# Patient Record
Sex: MALE | Race: WHITE | Employment: FULL TIME | URBAN - NONMETROPOLITAN AREA
[De-identification: names, ages, dates, MRNs, and addresses within clinical notes are randomized per-mention and may not be internally consistent; named-entity substitution may affect disease eponyms.]

---

## 2017-02-27 ENCOUNTER — TELEPHONE (OUTPATIENT)
Dept: CARDIOLOGY | Age: 57
End: 2017-02-27

## 2019-08-08 ENCOUNTER — OFFICE VISIT (OUTPATIENT)
Dept: FAMILY MEDICINE CLINIC | Age: 59
End: 2019-08-08
Payer: COMMERCIAL

## 2019-08-08 VITALS
SYSTOLIC BLOOD PRESSURE: 132 MMHG | WEIGHT: 227 LBS | HEIGHT: 66 IN | OXYGEN SATURATION: 96 % | BODY MASS INDEX: 36.48 KG/M2 | HEART RATE: 109 BPM | RESPIRATION RATE: 16 BRPM | TEMPERATURE: 97.1 F | DIASTOLIC BLOOD PRESSURE: 74 MMHG

## 2019-08-08 DIAGNOSIS — M25.50 ARTHRALGIA, UNSPECIFIED JOINT: ICD-10-CM
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
## 2019-08-08 DIAGNOSIS — R21 RASH OF BACK: Primary | ICD-10-CM

## 2019-08-08 DIAGNOSIS — K46.9 ABDOMINAL HERNIA WITHOUT OBSTRUCTION AND WITHOUT GANGRENE, RECURRENCE NOT SPECIFIED, UNSPECIFIED HERNIA TYPE: ICD-10-CM

## 2019-08-08 DIAGNOSIS — R21 RASH OF BACK: ICD-10-CM

## 2019-08-08 LAB
ALBUMIN SERPL-MCNC: 3.8 G/DL (ref 3.5–5.2)
ALP BLD-CCNC: 90 U/L (ref 40–130)
ALT SERPL-CCNC: 12 U/L (ref 5–41)
ANION GAP SERPL CALCULATED.3IONS-SCNC: 11 MMOL/L (ref 7–19)
AST SERPL-CCNC: 25 U/L (ref 5–40)
BASOPHILS ABSOLUTE: 0 K/UL (ref 0–0.2)
BASOPHILS RELATIVE PERCENT: 0.3 % (ref 0–1)
BILIRUB SERPL-MCNC: 0.3 MG/DL (ref 0.2–1.2)
BUN BLDV-MCNC: 11 MG/DL (ref 8–23)
C-REACTIVE PROTEIN: 2.99 MG/DL (ref 0–0.5)
CALCIUM SERPL-MCNC: 8.5 MG/DL (ref 8.8–10.2)
CHLORIDE BLD-SCNC: 101 MMOL/L (ref 98–111)
CO2: 25 MMOL/L (ref 22–29)
CREAT SERPL-MCNC: 1.2 MG/DL (ref 0.5–1.2)
EOSINOPHILS ABSOLUTE: 0 K/UL (ref 0–0.6)
EOSINOPHILS RELATIVE PERCENT: 0.2 % (ref 0–5)
GFR NON-AFRICAN AMERICAN: >60
GLUCOSE BLD-MCNC: 88 MG/DL (ref 74–109)
HCT VFR BLD CALC: 43.8 % (ref 42–52)
HEMOGLOBIN: 13.9 G/DL (ref 14–18)
LYMPHOCYTES ABSOLUTE: 2.3 K/UL (ref 1.1–4.5)
LYMPHOCYTES RELATIVE PERCENT: 37.2 % (ref 20–40)
MCH RBC QN AUTO: 27 PG (ref 27–31)
MCHC RBC AUTO-ENTMCNC: 31.7 G/DL (ref 33–37)
MCV RBC AUTO: 85 FL (ref 80–94)
MONOCYTES ABSOLUTE: 1.6 K/UL (ref 0–0.9)
MONOCYTES RELATIVE PERCENT: 25.8 % (ref 0–10)
NEUTROPHILS ABSOLUTE: 2 K/UL (ref 1.5–7.5)
NEUTROPHILS RELATIVE PERCENT: 32.5 % (ref 50–65)
PDW BLD-RTO: 13.3 % (ref 11.5–14.5)
PLATELET # BLD: 165 K/UL (ref 130–400)
PMV BLD AUTO: 10.2 FL (ref 9.4–12.4)
POTASSIUM SERPL-SCNC: 4 MMOL/L (ref 3.5–5)
RBC # BLD: 5.15 M/UL (ref 4.7–6.1)
RHEUMATOID FACTOR: <10 IU/ML
SODIUM BLD-SCNC: 137 MMOL/L (ref 136–145)
TOTAL PROTEIN: 7.3 G/DL (ref 6.6–8.7)
WBC # BLD: 6.2 K/UL (ref 4.8–10.8)

## 2019-08-08 PROCEDURE — 99204 OFFICE O/P NEW MOD 45 MIN: CPT | Performed by: FAMILY MEDICINE

## 2019-08-08 RX ORDER — DOXYCYCLINE HYCLATE 100 MG
100 TABLET ORAL 2 TIMES DAILY
Qty: 20 TABLET | Refills: 0 | Status: SHIPPED | OUTPATIENT
Start: 2019-08-08 | End: 2019-08-18

## 2019-08-08 RX ORDER — MELOXICAM 15 MG/1
15 TABLET ORAL DAILY
Qty: 30 TABLET | Refills: 3 | Status: SHIPPED | OUTPATIENT
Start: 2019-08-08 | End: 2019-08-26

## 2019-08-08 SDOH — HEALTH STABILITY: MENTAL HEALTH: HOW OFTEN DO YOU HAVE A DRINK CONTAINING ALCOHOL?: NEVER

## 2019-08-08 ASSESSMENT — PATIENT HEALTH QUESTIONNAIRE - PHQ9
SUM OF ALL RESPONSES TO PHQ9 QUESTIONS 1 & 2: 0
SUM OF ALL RESPONSES TO PHQ QUESTIONS 1-9: 0
2. FEELING DOWN, DEPRESSED OR HOPELESS: 0
1. LITTLE INTEREST OR PLEASURE IN DOING THINGS: 0
SUM OF ALL RESPONSES TO PHQ QUESTIONS 1-9: 0

## 2019-08-11 LAB
ANA IGG, ELISA: NORMAL
EHRLICHIA CHAFFEENSIS AB, IGG: NORMAL
EHRLICHIA CHAFFEENSIS AB, IGM: NORMAL
LYME, EIA: 0.53 LIV (ref 0–1.2)

## 2019-08-11 ASSESSMENT — ENCOUNTER SYMPTOMS
ABDOMINAL PAIN: 0
DIARRHEA: 0
RHINORRHEA: 0
WHEEZING: 0
EYE PAIN: 0
EYE DISCHARGE: 0
COUGH: 0
SHORTNESS OF BREATH: 0
VOMITING: 0
NAUSEA: 0
CONSTIPATION: 0
BACK PAIN: 0

## 2019-08-12 DIAGNOSIS — R21 RASH OF BACK: ICD-10-CM

## 2019-08-12 DIAGNOSIS — M25.50 ARTHRALGIA, UNSPECIFIED JOINT: ICD-10-CM

## 2019-08-12 LAB
ROCKY MOUNTAIN SPOTTED FEVER AB IGM: ABNORMAL
ROCKY MOUNTAIN SPOTTED FEVER ANTIBODY IGG: ABNORMAL

## 2019-08-15 LAB — RPR: NORMAL

## 2019-08-19 ENCOUNTER — OFFICE VISIT (OUTPATIENT)
Dept: FAMILY MEDICINE CLINIC | Age: 59
End: 2019-08-19
Payer: COMMERCIAL

## 2019-08-19 ENCOUNTER — HOSPITAL ENCOUNTER (OUTPATIENT)
Age: 59
Setting detail: SPECIMEN
Discharge: HOME | End: 2019-08-19
Payer: COMMERCIAL

## 2019-08-19 VITALS
RESPIRATION RATE: 20 BRPM | SYSTOLIC BLOOD PRESSURE: 118 MMHG | TEMPERATURE: 98 F | OXYGEN SATURATION: 97 % | HEART RATE: 92 BPM | DIASTOLIC BLOOD PRESSURE: 76 MMHG | WEIGHT: 229 LBS | BODY MASS INDEX: 36.8 KG/M2 | HEIGHT: 66 IN

## 2019-08-19 DIAGNOSIS — M19.90 ARTHRITIS: ICD-10-CM

## 2019-08-19 DIAGNOSIS — A77.0 RMSF (ROCKY MOUNTAIN SPOTTED FEVER): ICD-10-CM

## 2019-08-19 DIAGNOSIS — R21 RASH AND NONSPECIFIC SKIN ERUPTION: Primary | ICD-10-CM

## 2019-08-19 PROCEDURE — 11104 PUNCH BX SKIN SINGLE LESION: CPT | Performed by: FAMILY MEDICINE

## 2019-08-19 PROCEDURE — 88305 TISSUE EXAM BY PATHOLOGIST: CPT

## 2019-08-19 PROCEDURE — 99213 OFFICE O/P EST LOW 20 MIN: CPT | Performed by: FAMILY MEDICINE

## 2019-08-19 PROCEDURE — 88312 SPECIAL STAINS GROUP 1: CPT

## 2019-08-19 RX ORDER — DOXYCYCLINE HYCLATE 100 MG
100 TABLET ORAL 2 TIMES DAILY
Qty: 14 TABLET | Refills: 0 | Status: SHIPPED | OUTPATIENT
Start: 2019-08-19 | End: 2019-08-26

## 2019-08-19 ASSESSMENT — ENCOUNTER SYMPTOMS
EYE PAIN: 0
COUGH: 0
SHORTNESS OF BREATH: 0
NAUSEA: 0
CONSTIPATION: 0
RHINORRHEA: 0
ABDOMINAL PAIN: 0
BACK PAIN: 0
DIARRHEA: 0
EYE DISCHARGE: 0
VOMITING: 0
WHEEZING: 0

## 2019-08-19 NOTE — PROGRESS NOTES
sutures were placed  with good approximation of the skin. The involved area was cleaned with antibiotic ointment applied and bandage covering the involved area. No complications occurred and patient tolerated punch biopsy without issues. Assessment:       ICD-10-CM    1. Rash and nonspecific skin eruption R21 External Referral To Dermatology     Surgical Pathology   2. [facility] spotted fever) A77.0 doxycycline hyclate (VIBRA-TABS) 100 MG tablet   3. Arthritis M19.90  symptomatic improvement with use of Mobic. Will continue at this time continue to monitor and adjust course dictates. Plan:  Discussed suggestive diagnoses and potential further treatment and work-up. Discussed possibility of involving dermatology for further evaluation recommendations. Discussed possibility of a biopsy of the rash for further information. Also discussed the option of expectorant management moving forward due to a possible diagnosis. Through shared decision making the determination was to proceed with the biopsy for further evaluation as well as attempt to get him in with dermatology for further review and hopefully by that time the biopsy results will be available to assist with diagnosis and management moving forward. Discussed current expected course of doxycycline with [facility] spotted fever but with the rash and duration and his other symptoms discussed possibility of an extended course of the doxycycline and patient was agreeable. Discussed proper use of medication. Discussed signs and symptoms requiring medical attention. All questions were answered and patient voiced understanding and agreement with plan as discussed.     Orders Placed This Encounter   Procedures    Surgical Pathology     4 mm punch biopsy of rash taken from right lower back     Standing Status:   Future     Standing Expiration Date:   8/19/2020     Order Specific Question:   PREVIOUS BIOPSY     Answer:   No     Order Specific

## 2019-08-26 ENCOUNTER — OFFICE VISIT (OUTPATIENT)
Dept: FAMILY MEDICINE CLINIC | Age: 59
End: 2019-08-26
Payer: COMMERCIAL

## 2019-08-26 VITALS
OXYGEN SATURATION: 98 % | BODY MASS INDEX: 36.7 KG/M2 | SYSTOLIC BLOOD PRESSURE: 124 MMHG | HEART RATE: 95 BPM | WEIGHT: 227.4 LBS | DIASTOLIC BLOOD PRESSURE: 70 MMHG | TEMPERATURE: 96.7 F

## 2019-08-26 DIAGNOSIS — R21 RASH: Primary | ICD-10-CM

## 2019-08-26 DIAGNOSIS — M19.90 ARTHRITIS: ICD-10-CM

## 2019-08-26 PROCEDURE — 99213 OFFICE O/P EST LOW 20 MIN: CPT | Performed by: FAMILY MEDICINE

## 2019-08-26 ASSESSMENT — ENCOUNTER SYMPTOMS
BACK PAIN: 0
RHINORRHEA: 0
VOMITING: 0
NAUSEA: 0
SHORTNESS OF BREATH: 0
COUGH: 0
EYE PAIN: 0
CONSTIPATION: 0
DIARRHEA: 0
WHEEZING: 0
EYE DISCHARGE: 0
ABDOMINAL PAIN: 0

## 2019-08-28 DIAGNOSIS — M19.90 ARTHRITIS: Primary | ICD-10-CM

## 2019-08-28 RX ORDER — IBUPROFEN 800 MG/1
800 TABLET ORAL
Qty: 90 TABLET | Refills: 2 | Status: SHIPPED | OUTPATIENT
Start: 2019-08-28 | End: 2019-12-23

## 2019-09-25 LAB
C-REACTIVE PROTEIN: 2.38 MG/DL (ref 0–0.5)
SEDIMENTATION RATE, ERYTHROCYTE: 43 MM/HR (ref 0–15)

## 2019-09-28 LAB — ANA IGG, ELISA: DETECTED

## 2019-10-09 ENCOUNTER — TRANSCRIBE ORDERS (OUTPATIENT)
Dept: ADMINISTRATIVE | Facility: HOSPITAL | Age: 59
End: 2019-10-09

## 2019-10-09 ENCOUNTER — LAB (OUTPATIENT)
Dept: LAB | Facility: HOSPITAL | Age: 59
End: 2019-10-09

## 2019-10-09 DIAGNOSIS — Z51.81 ENCOUNTER FOR THERAPEUTIC DRUG MONITORING: ICD-10-CM

## 2019-10-09 DIAGNOSIS — L93.0 LUPUS ERYTHEMATOSUS, UNSPECIFIED FORM: ICD-10-CM

## 2019-10-09 DIAGNOSIS — Z51.81 ENCOUNTER FOR THERAPEUTIC DRUG MONITORING: Primary | ICD-10-CM

## 2019-10-09 LAB — CHROMATIN AB SERPL-ACNC: <10 IU/ML (ref 0–14)

## 2019-10-09 PROCEDURE — 86431 RHEUMATOID FACTOR QUANT: CPT | Performed by: DERMATOLOGY

## 2019-10-09 PROCEDURE — 86235 NUCLEAR ANTIGEN ANTIBODY: CPT | Performed by: DERMATOLOGY

## 2019-10-09 PROCEDURE — 86225 DNA ANTIBODY NATIVE: CPT | Performed by: DERMATOLOGY

## 2019-10-09 PROCEDURE — 36415 COLL VENOUS BLD VENIPUNCTURE: CPT

## 2019-10-10 LAB
DSDNA AB SER-ACNC: 26 IU/ML (ref 0–9)
ENA RNP AB SER-ACNC: <0.2 AI (ref 0–0.9)
ENA SM AB SER-ACNC: <0.2 AI (ref 0–0.9)
ENA SS-A AB SER-ACNC: <0.2 AI (ref 0–0.9)
ENA SS-B AB SER-ACNC: 0.2 AI (ref 0–0.9)

## 2019-12-23 DIAGNOSIS — M19.90 ARTHRITIS: ICD-10-CM

## 2019-12-23 RX ORDER — IBUPROFEN 800 MG/1
TABLET ORAL
Qty: 90 TABLET | Refills: 1 | Status: SHIPPED | OUTPATIENT
Start: 2019-12-23

## 2020-03-02 ENCOUNTER — TRANSCRIBE ORDERS (OUTPATIENT)
Dept: ADMINISTRATIVE | Facility: HOSPITAL | Age: 60
End: 2020-03-02

## 2020-03-02 ENCOUNTER — LAB (OUTPATIENT)
Dept: LAB | Facility: HOSPITAL | Age: 60
End: 2020-03-02

## 2020-03-02 DIAGNOSIS — M31.2: Primary | ICD-10-CM

## 2020-03-02 DIAGNOSIS — M31.2: ICD-10-CM

## 2020-03-02 LAB
ALBUMIN SERPL-MCNC: 4.2 G/DL (ref 3.5–5.2)
ALBUMIN/GLOB SERPL: 1.2 G/DL
ALP SERPL-CCNC: 88 U/L (ref 39–117)
ALT SERPL W P-5'-P-CCNC: 18 U/L (ref 1–41)
ANION GAP SERPL CALCULATED.3IONS-SCNC: 12 MMOL/L (ref 5–15)
AST SERPL-CCNC: 20 U/L (ref 1–40)
BASOPHILS # BLD AUTO: 0.01 10*3/MM3 (ref 0–0.2)
BASOPHILS NFR BLD AUTO: 0.2 % (ref 0–1.5)
BILIRUB SERPL-MCNC: 0.5 MG/DL (ref 0.2–1.2)
BUN BLD-MCNC: 11 MG/DL (ref 8–23)
BUN/CREAT SERPL: 13.3 (ref 7–25)
CALCIUM SPEC-SCNC: 9.2 MG/DL (ref 8.6–10.5)
CHLORIDE SERPL-SCNC: 107 MMOL/L (ref 98–107)
CO2 SERPL-SCNC: 26 MMOL/L (ref 22–29)
CREAT BLD-MCNC: 0.83 MG/DL (ref 0.76–1.27)
CRP SERPL-MCNC: 0.96 MG/DL (ref 0–0.5)
DEPRECATED RDW RBC AUTO: 43.3 FL (ref 37–54)
EOSINOPHIL # BLD AUTO: 0.01 10*3/MM3 (ref 0–0.4)
EOSINOPHIL NFR BLD AUTO: 0.2 % (ref 0.3–6.2)
ERYTHROCYTE [DISTWIDTH] IN BLOOD BY AUTOMATED COUNT: 15 % (ref 12.3–15.4)
ERYTHROCYTE [SEDIMENTATION RATE] IN BLOOD: 18 MM/HR (ref 0–20)
GFR SERPL CREATININE-BSD FRML MDRD: 94 ML/MIN/1.73
GLOBULIN UR ELPH-MCNC: 3.4 GM/DL
GLUCOSE BLD-MCNC: 106 MG/DL (ref 65–99)
HCT VFR BLD AUTO: 43.8 % (ref 37.5–51)
HGB BLD-MCNC: 14.1 G/DL (ref 13–17.7)
IMM GRANULOCYTES # BLD AUTO: 0.11 10*3/MM3 (ref 0–0.05)
IMM GRANULOCYTES NFR BLD AUTO: 1.8 % (ref 0–0.5)
LYMPHOCYTES # BLD AUTO: 2.6 10*3/MM3 (ref 0.7–3.1)
LYMPHOCYTES NFR BLD AUTO: 43.5 % (ref 19.6–45.3)
MCH RBC QN AUTO: 26.1 PG (ref 26.6–33)
MCHC RBC AUTO-ENTMCNC: 32.2 G/DL (ref 31.5–35.7)
MCV RBC AUTO: 81.1 FL (ref 79–97)
MONOCYTES # BLD AUTO: 1.16 10*3/MM3 (ref 0.1–0.9)
MONOCYTES NFR BLD AUTO: 19.4 % (ref 5–12)
NEUTROPHILS # BLD AUTO: 2.09 10*3/MM3 (ref 1.7–7)
NEUTROPHILS NFR BLD AUTO: 34.9 % (ref 42.7–76)
NRBC BLD AUTO-RTO: 0 /100 WBC (ref 0–0.2)
PLATELET # BLD AUTO: 207 10*3/MM3 (ref 140–450)
PMV BLD AUTO: 9.4 FL (ref 6–12)
POTASSIUM BLD-SCNC: 4.3 MMOL/L (ref 3.5–5.2)
PROT SERPL-MCNC: 7.6 G/DL (ref 6–8.5)
RBC # BLD AUTO: 5.4 10*6/MM3 (ref 4.14–5.8)
SODIUM BLD-SCNC: 145 MMOL/L (ref 136–145)
WBC NRBC COR # BLD: 5.98 10*3/MM3 (ref 3.4–10.8)

## 2020-03-02 PROCEDURE — 36415 COLL VENOUS BLD VENIPUNCTURE: CPT

## 2020-03-02 PROCEDURE — 85025 COMPLETE CBC W/AUTO DIFF WBC: CPT | Performed by: INTERNAL MEDICINE

## 2020-03-02 PROCEDURE — 86140 C-REACTIVE PROTEIN: CPT | Performed by: INTERNAL MEDICINE

## 2020-03-02 PROCEDURE — 85652 RBC SED RATE AUTOMATED: CPT | Performed by: INTERNAL MEDICINE

## 2020-03-02 PROCEDURE — 80053 COMPREHEN METABOLIC PANEL: CPT | Performed by: INTERNAL MEDICINE

## 2020-07-08 ENCOUNTER — TELEPHONE (OUTPATIENT)
Dept: FAMILY MEDICINE CLINIC | Age: 60
End: 2020-07-08

## 2020-07-08 NOTE — TELEPHONE ENCOUNTER
Patient called in stating he has been exposed to [place] by his father who is in the hospital (not for Covid, dementia/alzheimers issues). Patient is not experiencing any symptoms as of today. He was exposed on 7/6/20 and has been quarantining away from his immediate family and the general public. Requested I speak with Dr Karo Cook about the next steps. Spoke with Dr Karo Cook and his recommendations are as follows: \"since the patient is not showing any symptoms as of today, the best thing to do is to quarantine for 2 weeks. We do not want to get a false sense of security if we test too early. If he starts to develop symptoms to call and we will get him tested then. Patient is to call Dr Brittany Gordillo office to discuss their protocol on Covid. \" He voiced understanding and is agreeable to Dr Deborah Khoury recommendations

## 2020-07-10 ENCOUNTER — OFFICE VISIT (OUTPATIENT)
Age: 60
End: 2020-07-10

## 2020-07-14 LAB
REPORT: NORMAL
SARS-COV-2: NOT DETECTED
THIS TEST SENT TO: NORMAL

## 2021-02-03 ENCOUNTER — TRANSCRIBE ORDERS (OUTPATIENT)
Dept: ADMINISTRATIVE | Facility: HOSPITAL | Age: 61
End: 2021-02-03

## 2021-02-03 ENCOUNTER — LAB (OUTPATIENT)
Dept: LAB | Facility: HOSPITAL | Age: 61
End: 2021-02-03

## 2021-02-03 DIAGNOSIS — M06.4 INFLAMMATORY POLYARTHROPATHY (HCC): ICD-10-CM

## 2021-02-03 DIAGNOSIS — M06.4 INFLAMMATORY POLYARTHROPATHY (HCC): Primary | ICD-10-CM

## 2021-02-03 LAB
BASOPHILS # BLD AUTO: 0.02 10*3/MM3 (ref 0–0.2)
BASOPHILS NFR BLD AUTO: 0.4 % (ref 0–1.5)
DEPRECATED RDW RBC AUTO: 40 FL (ref 37–54)
EOSINOPHIL # BLD AUTO: 0.01 10*3/MM3 (ref 0–0.4)
EOSINOPHIL NFR BLD AUTO: 0.2 % (ref 0.3–6.2)
ERYTHROCYTE [DISTWIDTH] IN BLOOD BY AUTOMATED COUNT: 13.9 % (ref 12.3–15.4)
HCT VFR BLD AUTO: 40.5 % (ref 37.5–51)
HGB BLD-MCNC: 13.5 G/DL (ref 13–17.7)
IMM GRANULOCYTES # BLD AUTO: 0.06 10*3/MM3 (ref 0–0.05)
IMM GRANULOCYTES NFR BLD AUTO: 1.1 % (ref 0–0.5)
LYMPHOCYTES # BLD AUTO: 2.21 10*3/MM3 (ref 0.7–3.1)
LYMPHOCYTES NFR BLD AUTO: 41.3 % (ref 19.6–45.3)
MCH RBC QN AUTO: 27 PG (ref 26.6–33)
MCHC RBC AUTO-ENTMCNC: 33.3 G/DL (ref 31.5–35.7)
MCV RBC AUTO: 81 FL (ref 79–97)
MONOCYTES # BLD AUTO: 0.78 10*3/MM3 (ref 0.1–0.9)
MONOCYTES NFR BLD AUTO: 14.6 % (ref 5–12)
NEUTROPHILS NFR BLD AUTO: 2.27 10*3/MM3 (ref 1.7–7)
NEUTROPHILS NFR BLD AUTO: 42.4 % (ref 42.7–76)
NRBC BLD AUTO-RTO: 0 /100 WBC (ref 0–0.2)
PLATELET # BLD AUTO: 209 10*3/MM3 (ref 140–450)
PMV BLD AUTO: 10.8 FL (ref 6–12)
RBC # BLD AUTO: 5 10*6/MM3 (ref 4.14–5.8)
WBC # BLD AUTO: 5.35 10*3/MM3 (ref 3.4–10.8)

## 2021-02-03 PROCEDURE — 36415 COLL VENOUS BLD VENIPUNCTURE: CPT

## 2021-02-03 PROCEDURE — 85025 COMPLETE CBC W/AUTO DIFF WBC: CPT

## 2021-10-12 ENCOUNTER — OFFICE VISIT (OUTPATIENT)
Dept: FAMILY MEDICINE CLINIC | Age: 61
End: 2021-10-12
Payer: COMMERCIAL

## 2021-10-12 ENCOUNTER — PATIENT MESSAGE (OUTPATIENT)
Dept: FAMILY MEDICINE CLINIC | Age: 61
End: 2021-10-12

## 2021-10-12 VITALS
WEIGHT: 237 LBS | BODY MASS INDEX: 38.25 KG/M2 | OXYGEN SATURATION: 97 % | DIASTOLIC BLOOD PRESSURE: 66 MMHG | SYSTOLIC BLOOD PRESSURE: 134 MMHG | HEART RATE: 90 BPM | TEMPERATURE: 97.3 F | RESPIRATION RATE: 16 BRPM

## 2021-10-12 DIAGNOSIS — Z53.20 COLONOSCOPY REFUSED: ICD-10-CM

## 2021-10-12 DIAGNOSIS — N30.01 ACUTE CYSTITIS WITH HEMATURIA: Primary | ICD-10-CM

## 2021-10-12 DIAGNOSIS — S20.212A CHEST WALL CONTUSION, LEFT, INITIAL ENCOUNTER: ICD-10-CM

## 2021-10-12 DIAGNOSIS — R31.0 GROSS HEMATURIA: ICD-10-CM

## 2021-10-12 PROCEDURE — 99213 OFFICE O/P EST LOW 20 MIN: CPT | Performed by: FAMILY MEDICINE

## 2021-10-12 RX ORDER — PREDNISONE 1 MG/1
5 TABLET ORAL DAILY
COMMUNITY

## 2021-10-12 RX ORDER — SULFAMETHOXAZOLE AND TRIMETHOPRIM 800; 160 MG/1; MG/1
1 TABLET ORAL 2 TIMES DAILY
Qty: 14 TABLET | Refills: 0 | Status: SHIPPED | OUTPATIENT
Start: 2021-10-12 | End: 2021-10-19

## 2021-10-12 ASSESSMENT — PATIENT HEALTH QUESTIONNAIRE - PHQ9
SUM OF ALL RESPONSES TO PHQ9 QUESTIONS 1 & 2: 0
2. FEELING DOWN, DEPRESSED OR HOPELESS: 0
SUM OF ALL RESPONSES TO PHQ QUESTIONS 1-9: 0
1. LITTLE INTEREST OR PLEASURE IN DOING THINGS: 0

## 2021-10-12 NOTE — PROGRESS NOTES
[facility]  [address] [id number]  Dept: [phone]  Dept Fax: [phone]    Visit type: Established patient    Reason for Visit: Hematuria (started this am) and Chest Pain (left rib pain. fell 1 week ago)         Assessment and Plan       1. Acute cystitis with hematuria  -     sulfamethoxazole-trimethoprim (BACTRIM DS;SEPTRA DS) 800-160 MG per tablet; Take 1 tablet by mouth 2 times daily for 7 days, Disp-14 tablet, R-0Normal  2. Gross hematuria  -     Urinalysis Reflex to Culture; Future  3. Chest wall contusion, left, initial encounter  4. Colonoscopy refused    Discussed diagnosis, expected course, and proper use of medication, including OTC medications if prescription is too expensive or insurance does not cover. Discussed proper care of side. Discussed possibility of further work-up if symptoms continue or worsen. Discussed signs and symptoms requiring medical attention. All questions were answered and patient voiced understanding and agreement with plan as discussed. Return if symptoms worsen or fail to improve, for next scheduled follow up with PCP. Subjective       HPI   Patient reports that when he woke up during the night he noticed his urine was pink. He states that when he got up this morning it was darker and states that he is concerned that he might have a UTI. He denies any dysuria or other symptoms. He does state that about a week ago he fell and hurt his side. He states that it is still sore. He denies any problems breathing or other issues just wanted to have it looked at while he was her. Review of Systems   Constitutional: Negative for activity change, appetite change, fatigue and fever. HENT: Negative for congestion and rhinorrhea. Eyes: Negative for pain and discharge. Respiratory: Negative for cough and shortness of breath. Cardiovascular: Positive for chest pain (lateral chest wall). Negative for palpitations. Gastrointestinal: Negative for abdominal pain, constipation, diarrhea, nausea and vomiting. Endocrine: Negative for cold intolerance and heat intolerance. Genitourinary: Positive for frequency and hematuria. Negative for dysuria. Musculoskeletal: Negative for back pain, gait problem, myalgias and neck pain. Skin: Negative for rash and wound. No Known Allergies    Outpatient Medications Prior to Visit   Medication Sig Dispense Refill    predniSONE (DELTASONE) 5 MG tablet Take 5 mg by mouth daily      MULTIPLE VITAMIN PO Take by mouth daily      ibuprofen (ADVIL;MOTRIN) 800 MG tablet TAKE 1 TABLET BY MOUTH THREE TIMES DAILY WITH MEALS 90 tablet 1     No facility-administered medications prior to visit. Past Medical History:   Diagnosis Date    Hiatal hernia         Social History     Tobacco Use    Smoking status: Never Smoker    Smokeless tobacco: Never Used   Substance Use Topics    Alcohol use: Never        Past Surgical History:   Procedure Laterality Date    UMBILICAL HERNIA REPAIR N/A     UPPER GASTROINTESTINAL ENDOSCOPY         No family history on file. Objective       /66 (Site: Left Upper Arm, Position: Sitting, Cuff Size: Medium Adult)   Pulse 90   Temp 97.3 °F (36.3 °C) (Skin)   Resp 16   Wt 237 lb (107.5 kg)   SpO2 97%   BMI 38.25 kg/m²   Physical Exam  Vitals and nursing note reviewed. Constitutional:       General: He is not in acute distress. Appearance: Normal appearance. He is well-developed. He is not diaphoretic. HENT:      Head: Normocephalic and atraumatic. Right Ear: External ear normal.      Left Ear: External ear normal.      Mouth/Throat:      Comments: Mask in place  Eyes:      General: No scleral icterus. Right eye: No discharge. Left eye: No discharge. Conjunctiva/sclera: Conjunctivae normal.   Neck:      Trachea: No tracheal deviation. Cardiovascular:      Rate and Rhythm: Normal rate and regular rhythm. Heart sounds: Normal heart sounds. No murmur heard. No friction rub. No gallop. Pulmonary:      Effort: Pulmonary effort is normal. No respiratory distress. Breath sounds: Normal breath sounds. No wheezing or rales. Comments: Left lateral chest wall tenderness without any appreciable bruising. No appreciable bony tenderness. Chest:      Chest wall: Tenderness present. Abdominal:      General: Bowel sounds are normal. There is no distension. Palpations: Abdomen is soft. Tenderness: There is no abdominal tenderness. There is no right CVA tenderness or left CVA tenderness. Musculoskeletal:         General: No deformity (No gross deformities of upper or lower extremities) or signs of injury. Cervical back: Neck supple. No muscular tenderness. Right lower leg: No edema. Left lower leg: No edema. Lymphadenopathy:      Cervical: No cervical adenopathy. Skin:     General: Skin is warm and dry. Findings: No erythema or rash. Neurological:      Mental Status: He is alert and oriented to person, place, and time. Cranial Nerves: No cranial nerve deficit. Psychiatric:         Behavior: Behavior normal.         Thought Content:  Thought content normal.           Data Reviewed and Summarized       Labs:     Imaging/Testing:        Shakeel Alvarez MD

## 2021-10-12 NOTE — TELEPHONE ENCOUNTER
From: Kimberly Ireland  To: Devon Weston MD  Sent: 10/12/2021 8:22 AM CDT  Subject: Non-Urgent Medical Question    This morning I had pinkish urine and later it was more pinkish (a bit darker). Im 61 and have an auto immune condition but I fell on the ice last week and bruised a rib (still sore) and I think that might be a cause. Anyway, I can drop by the lab and give them a sample and come and see the dr or whatever I need to do just let me know.   [phone]

## 2021-11-05 ASSESSMENT — ENCOUNTER SYMPTOMS
ABDOMINAL PAIN: 0
CONSTIPATION: 0
DIARRHEA: 0
BACK PAIN: 0
NAUSEA: 0
EYE PAIN: 0
RHINORRHEA: 0
VOMITING: 0
COUGH: 0
SHORTNESS OF BREATH: 0
EYE DISCHARGE: 0

## 2021-11-06 NOTE — PATIENT INSTRUCTIONS
Patient Education        Blood in the Urine: Care Instructions  Your Care Instructions     Blood in the urine, or hematuria, may make the urine look red, brown, or pink. There may be blood every time you urinate or just from time to time. You cannot always see blood in the urine, but it will show up in a urine test.  Blood in the urine may be serious. It should always be checked by a doctor. Your doctor may recommend more tests, including an X-ray, a CT scan, or a cystoscopy (which lets a doctor look inside the urethra and bladder). Blood in the urine can be a sign of another problem. Common causes are bladder infections and kidney stones. An injury to your groin or your genital area can also cause bleeding in the urinary tract. Very hard exercisesuch as running a marathoncan cause blood in the urine. Blood in the urine can also be a sign of kidney disease or cancer in the bladder or kidney. Many cases of blood in the urine are caused by a harmless condition that runs in families. This is called benign familial hematuria. It does not need any treatment. Sometimes your urine may look red or brown even though it does not contain blood. For example, not getting enough fluids (dehydration), taking certain medicines, or having a liver problem can change the color of your urine. Eating foods such as beets, rhubarb, or blackberries or foods with red food coloring can make your urine look red or pink. Follow-up care is a key part of your treatment and safety. Be sure to make and go to all appointments, and call your doctor if you are having problems. It's also a good idea to know your test results and keep a list of the medicines you take. When should you call for help? Call your doctor now or seek immediate medical care if:    · You have symptoms of a urinary infection. For example:  ? You have pus in your urine. ? You have pain in your back just below your rib cage. This is called flank pain. ?  You have a fever, chills, or body aches. ? It hurts to urinate. ? You have groin or belly pain.     · You have more blood in your urine. Watch closely for changes in your health, and be sure to contact your doctor if:    · You have new urination problems.     · You do not get better as expected. Where can you learn more? Go to [email or web address] and sign in to your [employer] account. Enter C055 in the [employer] box to learn more about \"Blood in the Urine: Care Instructions. \"     If you do not have an account, please click on the \"Sign Up Now\" link. Current as of: February 10, 2021               Content Version: 13.0  © 2006-2021 Healthwise[employer] Incorporated. Care instructions adapted under license by [facility] ([facility]). If you have questions about a medical condition or this instruction, always ask your healthcare professional. [place] [address] any warranty or liability for your use of this information.

## 2023-06-18 DIAGNOSIS — J34.89 NASAL CONGESTION WITH RHINORRHEA: ICD-10-CM

## 2023-06-18 DIAGNOSIS — R09.81 NASAL CONGESTION WITH RHINORRHEA: ICD-10-CM

## 2023-06-19 RX ORDER — LEVOCETIRIZINE DIHYDROCHLORIDE 5 MG/1
5 TABLET, FILM COATED ORAL NIGHTLY
Qty: 90 TABLET | Refills: 1 | Status: SHIPPED | OUTPATIENT
Start: 2023-06-19

## 2023-06-19 RX ORDER — FLUTICASONE PROPIONATE 50 MCG
SPRAY, SUSPENSION (ML) NASAL
Qty: 3 EACH | Refills: 1 | Status: SHIPPED | OUTPATIENT
Start: 2023-06-19

## 2023-06-19 NOTE — TELEPHONE ENCOUNTER
Last OV 3/23/2023  Next OV Visit date not found      Requested Prescriptions     Pending Prescriptions Disp Refills    fluticasone (FLONASE) 50 MCG/ACT nasal spray [Pharmacy Med Name: FLUTICASONE PROP 50 MCG SPRAY]       Sig: SPRAY 2 SPRAYS INTO EACH NOSTRIL EVERY DAY    levocetirizine (XYZAL) 5 MG tablet [Pharmacy Med Name: LEVOCETIRIZINE 5 MG TABLET] 90 tablet      Sig: TAKE 1 TABLET BY MOUTH NIGHTLY

## 2024-01-04 ENCOUNTER — OFFICE VISIT (OUTPATIENT)
Dept: PRIMARY CARE CLINIC | Age: 64
End: 2024-01-04
Payer: MEDICARE

## 2024-01-04 VITALS
SYSTOLIC BLOOD PRESSURE: 136 MMHG | DIASTOLIC BLOOD PRESSURE: 82 MMHG | TEMPERATURE: 97.8 F | WEIGHT: 245 LBS | BODY MASS INDEX: 38.37 KG/M2 | RESPIRATION RATE: 136 BRPM | OXYGEN SATURATION: 98 % | HEART RATE: 87 BPM

## 2024-01-04 DIAGNOSIS — J06.9 UPPER RESPIRATORY TRACT INFECTION, UNSPECIFIED TYPE: Primary | ICD-10-CM

## 2024-01-04 DIAGNOSIS — J02.9 SORE THROAT: ICD-10-CM

## 2024-01-04 LAB
INFLUENZA A ANTIBODY: NEGATIVE
INFLUENZA B ANTIBODY: NEGATIVE
S PYO AG THROAT QL: NORMAL

## 2024-01-04 PROCEDURE — G8484 FLU IMMUNIZE NO ADMIN: HCPCS | Performed by: NURSE PRACTITIONER

## 2024-01-04 PROCEDURE — G8427 DOCREV CUR MEDS BY ELIG CLIN: HCPCS | Performed by: NURSE PRACTITIONER

## 2024-01-04 PROCEDURE — 1123F ACP DISCUSS/DSCN MKR DOCD: CPT | Performed by: NURSE PRACTITIONER

## 2024-01-04 PROCEDURE — 3017F COLORECTAL CA SCREEN DOC REV: CPT | Performed by: NURSE PRACTITIONER

## 2024-01-04 PROCEDURE — 1036F TOBACCO NON-USER: CPT | Performed by: NURSE PRACTITIONER

## 2024-01-04 PROCEDURE — 99203 OFFICE O/P NEW LOW 30 MIN: CPT | Performed by: NURSE PRACTITIONER

## 2024-01-04 PROCEDURE — G8417 CALC BMI ABV UP PARAM F/U: HCPCS | Performed by: NURSE PRACTITIONER

## 2024-01-04 RX ORDER — AMOXICILLIN AND CLAVULANATE POTASSIUM 875; 125 MG/1; MG/1
1 TABLET, FILM COATED ORAL 2 TIMES DAILY
Qty: 20 TABLET | Refills: 0 | Status: SHIPPED | OUTPATIENT
Start: 2024-01-04 | End: 2024-01-04 | Stop reason: SDUPTHER

## 2024-01-04 RX ORDER — AMOXICILLIN AND CLAVULANATE POTASSIUM 875; 125 MG/1; MG/1
1 TABLET, FILM COATED ORAL 2 TIMES DAILY
Qty: 20 TABLET | Refills: 0 | Status: SHIPPED | OUTPATIENT
Start: 2024-01-04 | End: 2024-01-14

## 2024-01-04 ASSESSMENT — ENCOUNTER SYMPTOMS
VOMITING: 0
ABDOMINAL PAIN: 0
EYE DISCHARGE: 0
SORE THROAT: 1
WHEEZING: 0
NAUSEA: 0
COUGH: 1
DIARRHEA: 0
SINUS PRESSURE: 0
CONSTIPATION: 0
BLOOD IN STOOL: 0
SHORTNESS OF BREATH: 0
RHINORRHEA: 0
COLOR CHANGE: 0
EYE ITCHING: 0

## 2024-01-04 NOTE — PROGRESS NOTES
facility-administered medications for this visit.       No Known Allergies    Health Maintenance   Topic Date Due    COVID-19 Vaccine (1) Never done    Depression Screen  Never done    HIV screen  Never done    Hepatitis C screen  Never done    DTaP/Tdap/Td vaccine (1 - Tdap) Never done    Diabetes screen  Never done    Lipids  Never done    Colorectal Cancer Screen  Never done    Shingles vaccine (1 of 2) Never done    Respiratory Syncytial Virus (RSV) Pregnant or age 60 yrs+ (1 - 1-dose 60+ series) Never done    Pneumococcal 65+ years Vaccine (1 - PCV) Never done    Flu vaccine  Completed    Hepatitis A vaccine  Aged Out    Hepatitis B vaccine  Aged Out    Hib vaccine  Aged Out    Polio vaccine  Aged Out    Meningococcal (ACWY) vaccine  Aged Out       Subjective:   Review of Systems   Constitutional:  Negative for activity change, appetite change, chills, fatigue and fever.   HENT:  Positive for congestion and sore throat. Negative for ear pain, rhinorrhea and sinus pressure.    Eyes:  Negative for discharge and itching.   Respiratory:  Positive for cough (productive - purulent). Negative for shortness of breath and wheezing.    Cardiovascular:  Negative for chest pain.   Gastrointestinal:  Negative for abdominal pain, blood in stool, constipation, diarrhea, nausea and vomiting.   Musculoskeletal:  Negative for myalgias.   Skin:  Negative for color change and rash.   Neurological:  Negative for dizziness and headaches.   All other systems reviewed and are negative.      Objective    Physical Exam  Vitals and nursing note reviewed.   Constitutional:       General: He is not in acute distress.     Appearance: Normal appearance.   HENT:      Head: Normocephalic and atraumatic.      Right Ear: Ear canal normal. A middle ear effusion is present.      Left Ear: Ear canal normal. A middle ear effusion is present.      Nose: Congestion and rhinorrhea present. Rhinorrhea is purulent.      Right Turbinates: Swollen.

## 2024-01-04 NOTE — PATIENT INSTRUCTIONS
- Take full course of antibiotics  - Increase fluid intake  - Recommended OTC mucinex   - May use OTC claritin/zyrtec and nasal spray such as flonase to help symptoms  - If patient is not improving or developing any new/worsening symptoms then return to clinic or f/u with PCP

## 2024-02-29 ENCOUNTER — OFFICE VISIT (OUTPATIENT)
Dept: FAMILY MEDICINE CLINIC | Age: 64
End: 2024-02-29
Payer: MEDICARE

## 2024-02-29 VITALS
SYSTOLIC BLOOD PRESSURE: 139 MMHG | HEART RATE: 93 BPM | OXYGEN SATURATION: 99 % | WEIGHT: 248.2 LBS | BODY MASS INDEX: 38.96 KG/M2 | DIASTOLIC BLOOD PRESSURE: 84 MMHG | HEIGHT: 67 IN | TEMPERATURE: 98 F

## 2024-02-29 DIAGNOSIS — Z12.5 SCREENING FOR MALIGNANT NEOPLASM OF PROSTATE: ICD-10-CM

## 2024-02-29 DIAGNOSIS — Z13.220 SCREENING CHOLESTEROL LEVEL: ICD-10-CM

## 2024-02-29 DIAGNOSIS — R53.83 FATIGUE, UNSPECIFIED TYPE: ICD-10-CM

## 2024-02-29 DIAGNOSIS — K92.1 BLOOD IN STOOL: ICD-10-CM

## 2024-02-29 DIAGNOSIS — Z12.11 SCREENING FOR MALIGNANT NEOPLASM OF COLON: ICD-10-CM

## 2024-02-29 DIAGNOSIS — L92.8 OTHER GRANULOMATOUS DISORDERS OF THE SKIN AND SUBCUTANEOUS TISSUE: Primary | ICD-10-CM

## 2024-02-29 DIAGNOSIS — J98.01 ACUTE BRONCHOSPASM: ICD-10-CM

## 2024-02-29 DIAGNOSIS — M06.4 INFLAMMATORY POLYARTHRITIS (HCC): ICD-10-CM

## 2024-02-29 LAB
ALBUMIN SERPL-MCNC: 4.5 G/DL (ref 3.5–5.2)
ALP SERPL-CCNC: 100 U/L (ref 40–130)
ALT SERPL-CCNC: 17 U/L (ref 5–41)
ANION GAP SERPL CALCULATED.3IONS-SCNC: 9 MMOL/L (ref 7–19)
AST SERPL-CCNC: 23 U/L (ref 5–40)
BASOPHILS # BLD: 0.1 K/UL (ref 0–0.2)
BASOPHILS NFR BLD: 1 % (ref 0–1)
BILIRUB SERPL-MCNC: 0.5 MG/DL (ref 0.2–1.2)
BUN SERPL-MCNC: 9 MG/DL (ref 8–23)
CALCIUM SERPL-MCNC: 10.3 MG/DL (ref 8.8–10.2)
CHLORIDE SERPL-SCNC: 104 MMOL/L (ref 98–111)
CHOLEST SERPL-MCNC: 168 MG/DL (ref 160–199)
CO2 SERPL-SCNC: 29 MMOL/L (ref 22–29)
CREAT SERPL-MCNC: 1.1 MG/DL (ref 0.5–1.2)
EOSINOPHIL # BLD: 0.06 K/UL (ref 0–0.6)
EOSINOPHIL NFR BLD: 1 % (ref 0–5)
ERYTHROCYTE [DISTWIDTH] IN BLOOD BY AUTOMATED COUNT: 13.8 % (ref 11.5–14.5)
GLUCOSE SERPL-MCNC: 98 MG/DL (ref 74–109)
HCT VFR BLD AUTO: 46 % (ref 42–52)
HDLC SERPL-MCNC: 33 MG/DL (ref 55–121)
HGB BLD-MCNC: 14.2 G/DL (ref 14–18)
IMM GRANULOCYTES # BLD: 0 K/UL
LDLC SERPL CALC-MCNC: 104 MG/DL
LYMPHOCYTES # BLD: 1.6 K/UL (ref 1.1–4.5)
LYMPHOCYTES NFR BLD: 10 % (ref 20–40)
MCH RBC QN AUTO: 26.1 PG (ref 27–31)
MCHC RBC AUTO-ENTMCNC: 30.9 G/DL (ref 33–37)
MCV RBC AUTO: 84.4 FL (ref 80–94)
MONOCYTES # BLD: 1 K/UL (ref 0–0.9)
MONOCYTES NFR BLD: 16 % (ref 0–10)
NEUTROPHILS # BLD: 3.5 K/UL (ref 1.5–7.5)
NEUTS BAND NFR BLD MANUAL: 1 % (ref 0–5)
NEUTS SEG NFR BLD: 55 % (ref 50–65)
PLATELET # BLD AUTO: 298 K/UL (ref 130–400)
PLATELET SLIDE REVIEW: ADEQUATE
PMV BLD AUTO: 10.1 FL (ref 9.4–12.4)
POTASSIUM SERPL-SCNC: 4.3 MMOL/L (ref 3.5–5)
PROT SERPL-MCNC: 8.4 G/DL (ref 6.6–8.7)
PSA SERPL-MCNC: 0.7 NG/ML (ref 0–4)
RBC # BLD AUTO: 5.45 M/UL (ref 4.7–6.1)
RBC MORPH BLD: NORMAL
SODIUM SERPL-SCNC: 142 MMOL/L (ref 136–145)
TRIGL SERPL-MCNC: 153 MG/DL (ref 0–149)
TSH SERPL DL<=0.005 MIU/L-ACNC: 2.46 UIU/ML (ref 0.27–4.2)
VARIANT LYMPHS NFR BLD: 16 % (ref 0–8)
WBC # BLD AUTO: 6.2 K/UL (ref 4.8–10.8)

## 2024-02-29 PROCEDURE — 1036F TOBACCO NON-USER: CPT | Performed by: FAMILY MEDICINE

## 2024-02-29 PROCEDURE — 99214 OFFICE O/P EST MOD 30 MIN: CPT | Performed by: FAMILY MEDICINE

## 2024-02-29 PROCEDURE — G8417 CALC BMI ABV UP PARAM F/U: HCPCS | Performed by: FAMILY MEDICINE

## 2024-02-29 PROCEDURE — 1123F ACP DISCUSS/DSCN MKR DOCD: CPT | Performed by: FAMILY MEDICINE

## 2024-02-29 PROCEDURE — G8484 FLU IMMUNIZE NO ADMIN: HCPCS | Performed by: FAMILY MEDICINE

## 2024-02-29 PROCEDURE — 3017F COLORECTAL CA SCREEN DOC REV: CPT | Performed by: FAMILY MEDICINE

## 2024-02-29 PROCEDURE — G8427 DOCREV CUR MEDS BY ELIG CLIN: HCPCS | Performed by: FAMILY MEDICINE

## 2024-02-29 RX ORDER — ALBUTEROL SULFATE 90 UG/1
2 AEROSOL, METERED RESPIRATORY (INHALATION) EVERY 6 HOURS PRN
Qty: 18 G | Refills: 3 | Status: SHIPPED | OUTPATIENT
Start: 2024-02-29

## 2024-02-29 SDOH — ECONOMIC STABILITY: INCOME INSECURITY: HOW HARD IS IT FOR YOU TO PAY FOR THE VERY BASICS LIKE FOOD, HOUSING, MEDICAL CARE, AND HEATING?: NOT HARD AT ALL

## 2024-02-29 SDOH — ECONOMIC STABILITY: HOUSING INSECURITY
IN THE LAST 12 MONTHS, WAS THERE A TIME WHEN YOU DID NOT HAVE A STEADY PLACE TO SLEEP OR SLEPT IN A SHELTER (INCLUDING NOW)?: NO

## 2024-02-29 SDOH — ECONOMIC STABILITY: FOOD INSECURITY: WITHIN THE PAST 12 MONTHS, YOU WORRIED THAT YOUR FOOD WOULD RUN OUT BEFORE YOU GOT MONEY TO BUY MORE.: NEVER TRUE

## 2024-02-29 SDOH — ECONOMIC STABILITY: FOOD INSECURITY: WITHIN THE PAST 12 MONTHS, THE FOOD YOU BOUGHT JUST DIDN'T LAST AND YOU DIDN'T HAVE MONEY TO GET MORE.: NEVER TRUE

## 2024-02-29 ASSESSMENT — PATIENT HEALTH QUESTIONNAIRE - PHQ9
SUM OF ALL RESPONSES TO PHQ QUESTIONS 1-9: 2
1. LITTLE INTEREST OR PLEASURE IN DOING THINGS: 1
2. FEELING DOWN, DEPRESSED OR HOPELESS: 1
SUM OF ALL RESPONSES TO PHQ9 QUESTIONS 1 & 2: 2

## 2024-02-29 ASSESSMENT — ENCOUNTER SYMPTOMS
VOMITING: 0
BLOOD IN STOOL: 1
DIARRHEA: 0
ABDOMINAL PAIN: 0
RHINORRHEA: 0
BACK PAIN: 0
COUGH: 0
NAUSEA: 0
SHORTNESS OF BREATH: 0
CONSTIPATION: 0

## 2024-02-29 NOTE — PROGRESS NOTES
tried on other medications but didn't tolerate them as well.     Review of Systems   Constitutional:  Positive for fatigue. Negative for activity change, appetite change and fever.   HENT:  Negative for congestion and rhinorrhea.    Respiratory:  Negative for cough and shortness of breath.    Cardiovascular:  Negative for chest pain and palpitations.   Gastrointestinal:  Positive for blood in stool. Negative for abdominal pain, constipation, diarrhea, nausea and vomiting.   Genitourinary:  Negative for dysuria and hematuria.   Musculoskeletal:  Negative for back pain, gait problem and neck pain.   Skin:  Positive for rash. Negative for wound.   Neurological:  Negative for syncope and weakness.   Hematological:  Negative for adenopathy. Does not bruise/bleed easily.   Psychiatric/Behavioral:  Negative for dysphoric mood and sleep disturbance. The patient is not nervous/anxious.         No Known Allergies    Outpatient Medications Prior to Visit   Medication Sig Dispense Refill    fluticasone (FLONASE) 50 MCG/ACT nasal spray SPRAY 2 SPRAYS INTO EACH NOSTRIL EVERY DAY 3 each 1    levocetirizine (XYZAL) 5 MG tablet TAKE 1 TABLET BY MOUTH NIGHTLY 90 tablet 1    predniSONE (DELTASONE) 5 MG tablet Take 1 tablet by mouth daily      MULTIPLE VITAMIN PO Take by mouth daily      ibuprofen (ADVIL;MOTRIN) 800 MG tablet TAKE 1 TABLET BY MOUTH THREE TIMES DAILY WITH MEALS 90 tablet 1    albuterol sulfate HFA (PROVENTIL HFA) 108 (90 Base) MCG/ACT inhaler Inhale 2 puffs into the lungs every 6 hours as needed for Wheezing 18 g 3     No facility-administered medications prior to visit.        Past Medical History:   Diagnosis Date    Hiatal hernia         Social History     Tobacco Use    Smoking status: Never    Smokeless tobacco: Never   Substance Use Topics    Alcohol use: Never        Past Surgical History:   Procedure Laterality Date    UMBILICAL HERNIA REPAIR N/A     UPPER GASTROINTESTINAL ENDOSCOPY         No family history on

## 2024-03-27 NOTE — TELEPHONE ENCOUNTER
Patient has been scheduled to come in to see Dr Sun Seek later today Prior to immunization administration, verified patients identity using patient s name and date of birth. Please see Immunization Activity for additional information.     Screening Questionnaire for Adult Immunization    Are you sick today?   No   Do you have allergies to medications, food, a vaccine component or latex?   No   Have you ever had a serious reaction after receiving a vaccination?   No   Do you have a long-term health problem with heart, lung, kidney, or metabolic disease (e.g., diabetes), asthma, a blood disorder, no spleen, complement component deficiency, a cochlear implant, or a spinal fluid leak?  Are you on long-term aspirin therapy?   No   Do you have cancer, leukemia, HIV/AIDS, or any other immune system problem?   No   Do you have a parent, brother, or sister with an immune system problem?   No   In the past 3 months, have you taken medications that affect  your immune system, such as prednisone, other steroids, or anticancer drugs; drugs for the treatment of rheumatoid arthritis, Crohn s disease, or psoriasis; or have you had radiation treatments?   No   Have you had a seizure, or a brain or other nervous system problem?   No   During the past year, have you received a transfusion of blood or blood    products, or been given immune (gamma) globulin or antiviral drug?   No   For women: Are you pregnant or is there a chance you could become       pregnant during the next month?   No   Have you received any vaccinations in the past 4 weeks?   No     Immunization questionnaire answers were all negative.      Patient instructed to remain in clinic for 15 minutes afterwards, and to report any adverse reactions.     Screening performed by Carolina Mendoza MA on 3/27/2024 at 3:19 PM.

## 2024-03-28 ENCOUNTER — OFFICE VISIT (OUTPATIENT)
Dept: GASTROENTEROLOGY | Facility: CLINIC | Age: 64
End: 2024-03-28
Payer: MEDICARE

## 2024-03-28 VITALS
BODY MASS INDEX: 39.37 KG/M2 | DIASTOLIC BLOOD PRESSURE: 70 MMHG | OXYGEN SATURATION: 98 % | WEIGHT: 245 LBS | TEMPERATURE: 97.6 F | HEART RATE: 81 BPM | SYSTOLIC BLOOD PRESSURE: 128 MMHG | HEIGHT: 66 IN

## 2024-03-28 DIAGNOSIS — K62.5 RECTAL BLEED: Primary | ICD-10-CM

## 2024-03-28 PROCEDURE — 99204 OFFICE O/P NEW MOD 45 MIN: CPT | Performed by: NURSE PRACTITIONER

## 2024-03-28 PROCEDURE — 1160F RVW MEDS BY RX/DR IN RCRD: CPT | Performed by: NURSE PRACTITIONER

## 2024-03-28 PROCEDURE — 1159F MED LIST DOCD IN RCRD: CPT | Performed by: NURSE PRACTITIONER

## 2024-03-28 RX ORDER — SODIUM PICOSULFATE, MAGNESIUM OXIDE, AND ANHYDROUS CITRIC ACID 10; 3.5; 12 MG/160ML; G/160ML; G/160ML
1 LIQUID ORAL TAKE AS DIRECTED
Qty: 320 ML | Refills: 0 | Status: CANCELLED | OUTPATIENT
Start: 2024-03-28

## 2024-03-28 RX ORDER — SODIUM, POTASSIUM,MAG SULFATES 17.5-3.13G
177 SOLUTION, RECONSTITUTED, ORAL ORAL TAKE AS DIRECTED
Qty: 177 ML | Refills: 0 | Status: CANCELLED | OUTPATIENT
Start: 2024-03-28

## 2024-03-28 RX ORDER — PREDNISONE 5 MG/1
5 TABLET ORAL DAILY
COMMUNITY

## 2024-03-28 RX ORDER — LORATADINE 10 MG/1
10 TABLET ORAL DAILY
COMMUNITY

## 2024-03-28 NOTE — PROGRESS NOTES
"Chief Complaint   Patient presents with    Colonoscopy     Has blood in stool never had colon       PCP: Duy Lomeli MD  REFER: Duy Lomeli MD    Subjective     HPI    Antwan Stevenson is a 63 y.o. male who presents to office for preventative maintenance.  There is not  a personal history of colon polyps.   He does not have complaints of nausea/vomiting, change in bowels, weight loss,   Antwan Stevenson complains of frequent rectal bleed. History of fissures, hemorrhoids.  He feels this occurrence of bleeding is different from bleeding in past.  Some of the blood is darker.     There is not a family history of colon cancer in first degree relative.  There is not a family history of colon polyps in first degree relative.  Antwan Stevenson last colonoscopy-no previous colonoscopy .  Bowels do move on regular basis but stool described as small in shape.            No results for input(s): \"GLUCOSE\", \"NA\", \"K\", \"CREATININE\", \"BUN\", \"BCR\", \"ALKPHOS\", \"ALT\", \"AST\", \"BILITOT\", \"ALBUMIN\" in the last 61891 hours.    No results for input(s): \"EGFRIFNONA\", \"EGFRIFAFRI\", \"EGFRRESULT\" in the last 90007 hours.     History reviewed. No pertinent past medical history.  Past Surgical History:   Procedure Laterality Date    HEMORROIDECTOMY      HERNIA REPAIR       Outpatient Medications Marked as Taking for the 3/28/24 encounter (Office Visit) with Jose Guadalupe Diez APRN   Medication Sig Dispense Refill    albuterol sulfate  (90 Base) MCG/ACT inhaler Inhale 2 puffs Every 6 (Six) Hours As Needed.      loratadine (CLARITIN) 10 MG tablet Take 1 tablet by mouth Daily.      multivitamin (THERAGRAN) tablet tablet B Complex   1 tablet daily      predniSONE (DELTASONE) 5 MG tablet Take 1 tablet by mouth Daily.       No Known Allergies  Social History     Socioeconomic History    Marital status:    Tobacco Use    Smoking status: Never    Smokeless tobacco: Never   Vaping Use    Vaping status: Never Used   Substance and Sexual " Activity    Alcohol use: Never    Drug use: Never     Review of Systems   Constitutional:  Negative for fever and unexpected weight change.   HENT:  Negative for trouble swallowing.    Respiratory:  Negative for shortness of breath.    Cardiovascular:  Negative for chest pain.   Gastrointestinal:  Positive for anal bleeding. Negative for abdominal pain.     Objective   Vitals:    03/28/24 1030   BP: 128/70   Pulse: 81   Temp: 97.6 °F (36.4 °C)   SpO2: 98%     Physical Exam  Constitutional:       Appearance: Normal appearance. He is well-developed.   Eyes:      General: No scleral icterus.  Cardiovascular:      Heart sounds: Normal heart sounds. No murmur heard.  Pulmonary:      Effort: Pulmonary effort is normal.   Abdominal:      General: Bowel sounds are normal. There is no distension.      Palpations: Abdomen is soft.      Tenderness: There is no abdominal tenderness. There is no guarding.   Skin:     General: Skin is warm and dry.      Coloration: Skin is not jaundiced.   Neurological:      Mental Status: He is alert.   Psychiatric:         Behavior: Behavior is cooperative.       Imaging Results (Most Recent)       None          Body mass index is 39.54 kg/m².    Assessment & Plan   Diagnoses and all orders for this visit:    1. Rectal bleed (Primary)  -     Case Request; Standing  -     Case Request    Other orders  -     Implement Anesthesia Orders Day of Procedure; Standing  -     Obtain Informed Consent; Standing      COLONOSCOPY WITH ANESTHESIA (N/A)  suflave    Advised pt to stop use of NSAIDs, Fish Oil, and MV 5 days prior to procedure, per Dr Arias protocol.  Tylenol based products are ok to take.  Pt verbalized understanding.     All risks, benefits, alternatives, and indications of colonoscopy procedure have been discussed with the patient. Risks to include perforation of the colon requiring possible surgery or colostomy, risk of bleeding from biopsies or removal of colon tissue, possibility of  missing a colon polyp or cancer, or adverse drug reaction.  Benefits to include the diagnosis and management of disease of the colon and rectum. Alternatives to include barium enema, radiographic evaluation, lab testing or no intervention. He verbalizes understanding and agrees.         Jose Guadalupe Diez, APRN  03/28/24        There are no Patient Instructions on file for this visit.

## 2024-03-28 NOTE — H&P (VIEW-ONLY)
"Chief Complaint   Patient presents with    Colonoscopy     Has blood in stool never had colon       PCP: Duy Lomeli MD  REFER: Duy Lomeli MD    Subjective     HPI    Antwan Stevenson is a 63 y.o. male who presents to office for preventative maintenance.  There is not  a personal history of colon polyps.   He does not have complaints of nausea/vomiting, change in bowels, weight loss,   Antwan Stevenson complains of frequent rectal bleed. History of fissures, hemorrhoids.  He feels this occurrence of bleeding is different from bleeding in past.  Some of the blood is darker.     There is not a family history of colon cancer in first degree relative.  There is not a family history of colon polyps in first degree relative.  Antwan Stevenson last colonoscopy-no previous colonoscopy .  Bowels do move on regular basis but stool described as small in shape.            No results for input(s): \"GLUCOSE\", \"NA\", \"K\", \"CREATININE\", \"BUN\", \"BCR\", \"ALKPHOS\", \"ALT\", \"AST\", \"BILITOT\", \"ALBUMIN\" in the last 66888 hours.    No results for input(s): \"EGFRIFNONA\", \"EGFRIFAFRI\", \"EGFRRESULT\" in the last 25016 hours.     History reviewed. No pertinent past medical history.  Past Surgical History:   Procedure Laterality Date    HEMORROIDECTOMY      HERNIA REPAIR       Outpatient Medications Marked as Taking for the 3/28/24 encounter (Office Visit) with Jose Guadalupe Diez APRN   Medication Sig Dispense Refill    albuterol sulfate  (90 Base) MCG/ACT inhaler Inhale 2 puffs Every 6 (Six) Hours As Needed.      loratadine (CLARITIN) 10 MG tablet Take 1 tablet by mouth Daily.      multivitamin (THERAGRAN) tablet tablet B Complex   1 tablet daily      predniSONE (DELTASONE) 5 MG tablet Take 1 tablet by mouth Daily.       No Known Allergies  Social History     Socioeconomic History    Marital status:    Tobacco Use    Smoking status: Never    Smokeless tobacco: Never   Vaping Use    Vaping status: Never Used   Substance and Sexual " Activity    Alcohol use: Never    Drug use: Never     Review of Systems   Constitutional:  Negative for fever and unexpected weight change.   HENT:  Negative for trouble swallowing.    Respiratory:  Negative for shortness of breath.    Cardiovascular:  Negative for chest pain.   Gastrointestinal:  Positive for anal bleeding. Negative for abdominal pain.     Objective   Vitals:    03/28/24 1030   BP: 128/70   Pulse: 81   Temp: 97.6 °F (36.4 °C)   SpO2: 98%     Physical Exam  Constitutional:       Appearance: Normal appearance. He is well-developed.   Eyes:      General: No scleral icterus.  Cardiovascular:      Heart sounds: Normal heart sounds. No murmur heard.  Pulmonary:      Effort: Pulmonary effort is normal.   Abdominal:      General: Bowel sounds are normal. There is no distension.      Palpations: Abdomen is soft.      Tenderness: There is no abdominal tenderness. There is no guarding.   Skin:     General: Skin is warm and dry.      Coloration: Skin is not jaundiced.   Neurological:      Mental Status: He is alert.   Psychiatric:         Behavior: Behavior is cooperative.       Imaging Results (Most Recent)       None          Body mass index is 39.54 kg/m².    Assessment & Plan   Diagnoses and all orders for this visit:    1. Rectal bleed (Primary)  -     Case Request; Standing  -     Case Request    Other orders  -     Implement Anesthesia Orders Day of Procedure; Standing  -     Obtain Informed Consent; Standing      COLONOSCOPY WITH ANESTHESIA (N/A)  suflave    Advised pt to stop use of NSAIDs, Fish Oil, and MV 5 days prior to procedure, per Dr Arias protocol.  Tylenol based products are ok to take.  Pt verbalized understanding.     All risks, benefits, alternatives, and indications of colonoscopy procedure have been discussed with the patient. Risks to include perforation of the colon requiring possible surgery or colostomy, risk of bleeding from biopsies or removal of colon tissue, possibility of  missing a colon polyp or cancer, or adverse drug reaction.  Benefits to include the diagnosis and management of disease of the colon and rectum. Alternatives to include barium enema, radiographic evaluation, lab testing or no intervention. He verbalizes understanding and agrees.         Jose Guadalupe Diez, APRN  03/28/24        There are no Patient Instructions on file for this visit.

## 2024-03-29 ENCOUNTER — PATIENT ROUNDING (BHMG ONLY) (OUTPATIENT)
Dept: GASTROENTEROLOGY | Facility: CLINIC | Age: 64
End: 2024-03-29
Payer: MEDICARE

## 2024-03-29 NOTE — PROGRESS NOTES
March 29, 2024    Hello, may I speak with Antwan Stevenson?    My name is DIMITRI      I am  with [facility]  [address], [address]  [place] [address] [phone].    Before we get started may I verify your date of birth? 1960    I am calling to officially welcome you to our practice and ask about your recent visit. Is this a good time to talk? yes    Tell me about your visit with us. What things went well?  Visit was great       We're always looking for ways to make our patients' experiences even better. Do you have recommendations on ways we may improve?  no    Overall were you satisfied with your first visit to our practice? yes       I appreciate you taking the time to speak with me today. Is there anything else I can do for you? no      Thank you, and have a great day.

## 2024-04-11 ENCOUNTER — OFFICE VISIT (OUTPATIENT)
Dept: FAMILY MEDICINE CLINIC | Age: 64
End: 2024-04-11
Payer: MEDICARE

## 2024-04-11 ENCOUNTER — HOSPITAL ENCOUNTER (OUTPATIENT)
Dept: GENERAL RADIOLOGY | Age: 64
Discharge: HOME | End: 2024-04-11
Payer: MEDICARE

## 2024-04-11 VITALS
DIASTOLIC BLOOD PRESSURE: 74 MMHG | HEART RATE: 84 BPM | OXYGEN SATURATION: 96 % | SYSTOLIC BLOOD PRESSURE: 120 MMHG | BODY MASS INDEX: 38.37 KG/M2 | TEMPERATURE: 98.6 F | WEIGHT: 245 LBS | RESPIRATION RATE: 17 BRPM

## 2024-04-11 DIAGNOSIS — R31.9 HEMATURIA, UNSPECIFIED TYPE: ICD-10-CM

## 2024-04-11 DIAGNOSIS — N20.0 NEPHROLITHIASIS: ICD-10-CM

## 2024-04-11 DIAGNOSIS — R10.9 ACUTE RIGHT FLANK PAIN: ICD-10-CM

## 2024-04-11 DIAGNOSIS — N20.0 KIDNEY STONES: ICD-10-CM

## 2024-04-11 DIAGNOSIS — R10.9 ACUTE RIGHT FLANK PAIN: Primary | ICD-10-CM

## 2024-04-11 LAB
BACTERIA URNS QL MICRO: NEGATIVE /HPF
BILIRUB UR STRIP.AUTO-MCNC: NEGATIVE MG/DL
CLARITY UR: CLEAR
COLOR UR: YELLOW
CRYSTALS URNS MICRO: ABNORMAL /HPF
EPI CELLS #/AREA URNS AUTO: 0 /HPF (ref 0–5)
GLUCOSE UR STRIP.AUTO-MCNC: NEGATIVE MG/DL
HGB UR STRIP.AUTO-MCNC: ABNORMAL MG/L
HYALINE CASTS #/AREA URNS AUTO: 0 /HPF (ref 0–8)
KETONES UR STRIP.AUTO-MCNC: NEGATIVE MG/DL
LEUKOCYTE ESTERASE UR QL STRIP.AUTO: NEGATIVE
NITRITE UR QL STRIP.AUTO: NEGATIVE
PH UR STRIP.AUTO: 5.5 [PH] (ref 5–8)
PROT UR STRIP.AUTO-MCNC: ABNORMAL MG/DL
RBC #/AREA URNS AUTO: 17 /HPF (ref 0–4)
SP GR UR STRIP.AUTO: 1.02 (ref 1–1.03)
URN SPEC COLLECT METH UR: ABNORMAL
UROBILINOGEN UR STRIP.AUTO-MCNC: 0.2 E.U./DL
WBC #/AREA URNS AUTO: 1 /HPF (ref 0–5)

## 2024-04-11 PROCEDURE — 1036F TOBACCO NON-USER: CPT | Performed by: CLINICAL NURSE SPECIALIST

## 2024-04-11 PROCEDURE — 3017F COLORECTAL CA SCREEN DOC REV: CPT | Performed by: CLINICAL NURSE SPECIALIST

## 2024-04-11 PROCEDURE — 99214 OFFICE O/P EST MOD 30 MIN: CPT | Performed by: CLINICAL NURSE SPECIALIST

## 2024-04-11 PROCEDURE — G8417 CALC BMI ABV UP PARAM F/U: HCPCS | Performed by: CLINICAL NURSE SPECIALIST

## 2024-04-11 PROCEDURE — 74176 CT ABD & PELVIS W/O CONTRAST: CPT

## 2024-04-11 PROCEDURE — 1123F ACP DISCUSS/DSCN MKR DOCD: CPT | Performed by: CLINICAL NURSE SPECIALIST

## 2024-04-11 PROCEDURE — G8427 DOCREV CUR MEDS BY ELIG CLIN: HCPCS | Performed by: CLINICAL NURSE SPECIALIST

## 2024-04-11 RX ORDER — TAMSULOSIN HYDROCHLORIDE 0.4 MG/1
0.4 CAPSULE ORAL DAILY
Qty: 7 CAPSULE | Refills: 0 | Status: SHIPPED | OUTPATIENT
Start: 2024-04-11 | End: 2024-04-18

## 2024-04-11 SDOH — ECONOMIC STABILITY: FOOD INSECURITY: WITHIN THE PAST 12 MONTHS, YOU WORRIED THAT YOUR FOOD WOULD RUN OUT BEFORE YOU GOT MONEY TO BUY MORE.: NEVER TRUE

## 2024-04-11 SDOH — ECONOMIC STABILITY: FOOD INSECURITY: WITHIN THE PAST 12 MONTHS, THE FOOD YOU BOUGHT JUST DIDN'T LAST AND YOU DIDN'T HAVE MONEY TO GET MORE.: NEVER TRUE

## 2024-04-11 SDOH — ECONOMIC STABILITY: INCOME INSECURITY: HOW HARD IS IT FOR YOU TO PAY FOR THE VERY BASICS LIKE FOOD, HOUSING, MEDICAL CARE, AND HEATING?: NOT HARD AT ALL

## 2024-04-11 ASSESSMENT — PATIENT HEALTH QUESTIONNAIRE - PHQ9
SUM OF ALL RESPONSES TO PHQ QUESTIONS 1-9: 0
2. FEELING DOWN, DEPRESSED OR HOPELESS: NOT AT ALL
SUM OF ALL RESPONSES TO PHQ QUESTIONS 1-9: 0
SUM OF ALL RESPONSES TO PHQ QUESTIONS 1-9: 0
1. LITTLE INTEREST OR PLEASURE IN DOING THINGS: NOT AT ALL
SUM OF ALL RESPONSES TO PHQ9 QUESTIONS 1 & 2: 0
SUM OF ALL RESPONSES TO PHQ QUESTIONS 1-9: 0

## 2024-04-11 ASSESSMENT — ENCOUNTER SYMPTOMS
VOMITING: 0
SINUS PRESSURE: 0
SHORTNESS OF BREATH: 0
CHEST TIGHTNESS: 0
TROUBLE SWALLOWING: 0
BACK PAIN: 1
WHEEZING: 0
COUGH: 0
DIARRHEA: 0
SORE THROAT: 0
CONSTIPATION: 0
ABDOMINAL PAIN: 0
COLOR CHANGE: 0
NAUSEA: 0

## 2024-04-11 NOTE — PROGRESS NOTES
SUBJECTIVE:  Lux Jaime is a 63 y.o. who presents today for Nephrolithiasis      HPI    Mr Jaime presents today for an acute visit.   He has had right flank pain 2-3 days. He woke up with pain. No known injury. The pain is worse with standing and movement, better in seated position.  At times there is pain into right groin and right leg.   No abdominal pain.  Some urinary hesitancy, but no dysuria or hematuria.   Reports incidental finding of renal stones on imaging in the past.  Taking ibuprofen as needed for pain.    Past Medical History:   Diagnosis Date    Hiatal hernia      Past Surgical History:   Procedure Laterality Date    UMBILICAL HERNIA REPAIR N/A     UPPER GASTROINTESTINAL ENDOSCOPY       No family history on file.  Social History     Tobacco Use    Smoking status: Never    Smokeless tobacco: Never   Substance Use Topics    Alcohol use: Never     Current Outpatient Medications   Medication Sig Dispense Refill    tamsulosin (FLOMAX) 0.4 MG capsule Take 1 capsule by mouth daily for 7 days 7 capsule 0    albuterol sulfate HFA (PROVENTIL HFA) 108 (90 Base) MCG/ACT inhaler Inhale 2 puffs into the lungs every 6 hours as needed for Wheezing (Patient taking differently: Inhale 2 puffs into the lungs every 6 hours as needed for Wheezing PRN) 18 g 3    predniSONE (DELTASONE) 5 MG tablet Take 1 tablet by mouth daily      MULTIPLE VITAMIN PO Take by mouth daily      ibuprofen (ADVIL;MOTRIN) 800 MG tablet TAKE 1 TABLET BY MOUTH THREE TIMES DAILY WITH MEALS 90 tablet 1     No current facility-administered medications for this visit.     No Known Allergies    Review of Systems   Constitutional:  Negative for appetite change, chills, diaphoresis, fatigue and fever.   HENT:  Negative for congestion, ear pain, hearing loss, postnasal drip, sinus pressure, sore throat and trouble swallowing.    Respiratory:  Negative for cough, chest tightness, shortness of breath and wheezing.    Cardiovascular:  Negative for chest

## 2024-04-17 ENCOUNTER — ANESTHESIA EVENT (OUTPATIENT)
Dept: GASTROENTEROLOGY | Facility: HOSPITAL | Age: 64
End: 2024-04-17
Payer: MEDICARE

## 2024-04-17 ENCOUNTER — TELEPHONE (OUTPATIENT)
Dept: UROLOGY | Age: 64
End: 2024-04-17

## 2024-04-17 ENCOUNTER — ANESTHESIA (OUTPATIENT)
Dept: GASTROENTEROLOGY | Facility: HOSPITAL | Age: 64
End: 2024-04-17
Payer: MEDICARE

## 2024-04-17 ENCOUNTER — HOSPITAL ENCOUNTER (OUTPATIENT)
Facility: HOSPITAL | Age: 64
Setting detail: HOSPITAL OUTPATIENT SURGERY
Discharge: HOME | End: 2024-04-17
Attending: INTERNAL MEDICINE | Admitting: INTERNAL MEDICINE
Payer: MEDICARE

## 2024-04-17 VITALS
DIASTOLIC BLOOD PRESSURE: 75 MMHG | OXYGEN SATURATION: 92 % | SYSTOLIC BLOOD PRESSURE: 124 MMHG | HEART RATE: 83 BPM | RESPIRATION RATE: 18 BRPM | BODY MASS INDEX: 38.57 KG/M2 | HEIGHT: 66 IN | WEIGHT: 240 LBS | TEMPERATURE: 97.2 F

## 2024-04-17 DIAGNOSIS — K62.5 RECTAL BLEED: ICD-10-CM

## 2024-04-17 PROCEDURE — 45378 DIAGNOSTIC COLONOSCOPY: CPT | Performed by: INTERNAL MEDICINE

## 2024-04-17 PROCEDURE — 25810000003 SODIUM CHLORIDE 0.9 % SOLUTION: Performed by: ANESTHESIOLOGY

## 2024-04-17 PROCEDURE — 25010000002 PROPOFOL 10 MG/ML EMULSION: Performed by: NURSE ANESTHETIST, CERTIFIED REGISTERED

## 2024-04-17 PROCEDURE — 88361 TUMOR IMMUNOHISTOCHEM/COMPUT: CPT

## 2024-04-17 PROCEDURE — 88305 TISSUE EXAM BY PATHOLOGIST: CPT | Performed by: INTERNAL MEDICINE

## 2024-04-17 RX ORDER — PROPOFOL 10 MG/ML
VIAL (ML) INTRAVENOUS AS NEEDED
Status: DISCONTINUED | OUTPATIENT
Start: 2024-04-17 | End: 2024-04-17 | Stop reason: SURG

## 2024-04-17 RX ORDER — SODIUM CHLORIDE 9 MG/ML
1000 INJECTION, SOLUTION INTRAVENOUS CONTINUOUS
Status: DISCONTINUED | OUTPATIENT
Start: 2024-04-17 | End: 2024-04-17 | Stop reason: HOSPADM

## 2024-04-17 RX ORDER — SODIUM CHLORIDE 0.9 % (FLUSH) 0.9 %
10 SYRINGE (ML) INJECTION AS NEEDED
Status: DISCONTINUED | OUTPATIENT
Start: 2024-04-17 | End: 2024-04-17 | Stop reason: HOSPADM

## 2024-04-17 RX ORDER — SODIUM CHLORIDE 9 MG/ML
500 INJECTION, SOLUTION INTRAVENOUS CONTINUOUS PRN
Status: DISCONTINUED | OUTPATIENT
Start: 2024-04-17 | End: 2024-04-17 | Stop reason: HOSPADM

## 2024-04-17 RX ORDER — LIDOCAINE HYDROCHLORIDE 20 MG/ML
INJECTION, SOLUTION EPIDURAL; INFILTRATION; INTRACAUDAL; PERINEURAL AS NEEDED
Status: DISCONTINUED | OUTPATIENT
Start: 2024-04-17 | End: 2024-04-17 | Stop reason: SURG

## 2024-04-17 RX ADMIN — SODIUM CHLORIDE 500 ML: 9 INJECTION, SOLUTION INTRAVENOUS at 08:47

## 2024-04-17 RX ADMIN — PROPOFOL INJECTABLE EMULSION 30 MG: 10 INJECTION, EMULSION INTRAVENOUS at 09:23

## 2024-04-17 RX ADMIN — LIDOCAINE HYDROCHLORIDE 80 MG: 20 INJECTION, SOLUTION EPIDURAL; INFILTRATION; INTRACAUDAL; PERINEURAL at 09:16

## 2024-04-17 RX ADMIN — PROPOFOL INJECTABLE EMULSION 30 MG: 10 INJECTION, EMULSION INTRAVENOUS at 09:25

## 2024-04-17 RX ADMIN — PROPOFOL INJECTABLE EMULSION 30 MG: 10 INJECTION, EMULSION INTRAVENOUS at 09:18

## 2024-04-17 RX ADMIN — PROPOFOL INJECTABLE EMULSION 30 MG: 10 INJECTION, EMULSION INTRAVENOUS at 09:19

## 2024-04-17 RX ADMIN — PROPOFOL INJECTABLE EMULSION 40 MG: 10 INJECTION, EMULSION INTRAVENOUS at 09:17

## 2024-04-17 RX ADMIN — PROPOFOL INJECTABLE EMULSION 30 MG: 10 INJECTION, EMULSION INTRAVENOUS at 09:21

## 2024-04-17 RX ADMIN — PROPOFOL INJECTABLE EMULSION 30 MG: 10 INJECTION, EMULSION INTRAVENOUS at 09:20

## 2024-04-17 RX ADMIN — PROPOFOL INJECTABLE EMULSION 70 MG: 10 INJECTION, EMULSION INTRAVENOUS at 09:16

## 2024-04-17 NOTE — ANESTHESIA POSTPROCEDURE EVALUATION
Patient: Antwan Stevenson    Procedure Summary       Date: 04/17/24 Room / Location: [facility] ENDOSCOPY 2 / [facility]    Anesthesia Start: 0912 Anesthesia Stop: 0932    Procedure: COLONOSCOPY WITH ANESTHESIA Diagnosis:       Rectal bleed      (Rectal bleed [K62.5])    Surgeons: Salty Arias DO Provider: Danny Umana CRNA    Anesthesia Type: MAC ASA Status: 2            Anesthesia Type: MAC    Vitals  Vitals Value Taken Time   /76 04/17/24 0946   Temp     Pulse 79 04/17/24 0950   Resp 18 04/17/24 0945   SpO2 96 % 04/17/24 0950   Vitals shown include unfiled device data.        Post Anesthesia Care and Evaluation    Patient location during evaluation: PHASE II  Patient participation: complete - patient participated  Level of consciousness: awake  Pain score: 0  Pain management: adequate    Airway patency: patent  Anesthetic complications: No anesthetic complications  PONV Status: none  Cardiovascular status: acceptable  Respiratory status: acceptable  Hydration status: acceptable

## 2024-04-17 NOTE — ANESTHESIA PREPROCEDURE EVALUATION
Anesthesia Evaluation     Patient summary reviewed   no history of anesthetic complications:   NPO Solid Status: > 8 hours             Airway   Mallampati: II  No difficulty expected  Dental      Pulmonary - negative pulmonary ROS   Cardiovascular - negative cardio ROS  Exercise tolerance: good (4-7 METS)        Neuro/Psych- negative ROS  GI/Hepatic/Renal/Endo    (+) obesity, GI bleeding     Musculoskeletal     Abdominal    Substance History      OB/GYN          Other                    Anesthesia Plan    ASA 2     MAC       Anesthetic plan, risks, benefits, and alternatives have been provided, discussed and informed consent has been obtained with: patient.    CODE STATUS:

## 2024-04-17 NOTE — TELEPHONE ENCOUNTER
contacted patient to remind him that he will need KUB about 1 hr prior to appointment on 4/22. He voiced understanding and asked that I send him a [employer] message with this information so that he knows where to go.

## 2024-04-18 ENCOUNTER — TELEPHONE (OUTPATIENT)
Dept: GASTROENTEROLOGY | Facility: CLINIC | Age: 64
End: 2024-04-18
Payer: MEDICARE

## 2024-04-18 DIAGNOSIS — K62.5 RECTAL BLEED: Primary | ICD-10-CM

## 2024-04-18 DIAGNOSIS — C20 RECTAL CANCER: ICD-10-CM

## 2024-04-18 DIAGNOSIS — N20.0 KIDNEY STONE: Primary | ICD-10-CM

## 2024-04-18 LAB
CYTO UR: NORMAL
LAB AP CASE REPORT: NORMAL
LAB AP DIAGNOSIS COMMENT: NORMAL
Lab: NORMAL
PATH REPORT.FINAL DX SPEC: NORMAL
PATH REPORT.GROSS SPEC: NORMAL

## 2024-04-18 NOTE — PROGRESS NOTES
[facility] HEMATOLOGY & ONCOLOGY  [address]  [place] [address] [phone]    Patient Name: Antwan Stevenson  Encounter Date: 04/24/2024  YOB: 1960  Patient Number: [id number]    Initial Note    REASON FOR CONSULTATION: Antwan Stevenson is a pleasant 63 y.o.   male referred by CHERYLE Lopez* for management recommendations for rectal cancer, staging workup pending.  He is seen alone. History is obtained from patient. History is considered to be accurate.      HISTORY OF PRESENT ILLNESS: He presented with hematochezia.    Patient seen by Jose Guadalupe Diez, APRN 3/20/2024.  He reports frequent rectal bleeding.  Patient scheduled for colonoscopy.    CT abdomen pelvis 4/11/2024.Bilateral nephrolithiasis without obstructive uropathy.   Cholelithiasis.   Left pelvic spigelian hernia containing a portion of sigmoid colon.  No bowel obstruction or inflammation. Fatty supraumbilical and bilateral inguinal hernias present as well.  Evidence of prior umbilical hernia repair.  Bibasilar pulmonary micronodules measuring up to 0.4 cm.  Chest CT follow-up in 12 months recommended unless shorter interval follow-up is warranted by history.     Colonoscopy 4/17/2024.  Anal mass. The colon was normal from the rectum to the mid transverse, as I had to use an endoscope due to the severe anal stenosis.  No specimens collected.  The digital rectal exam revealed a firm, hard and obstructing anal mass. The mass was circumferential. Findings: The entire examined colon appeared normal    Pathology report 4/18/2024. 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    Phone note 4/18/2024.  Patient referred to oncology and colorectal surgery at [place].    Family history negative for colorectal cancer.    Her had no prior colonoscopy.     Father had prostate cancer. No family history of colorectal cancer.  "        LABS    Lab Results - Last 18 Months   Lab Units 02/29/24  0859   HEMOGLOBIN g/dL 14.2   HEMATOCRIT % 46.0   MCV fL 84.4   WBC K/uL 6.2   RDW % 13.8   MPV fL 10.1   PLATELETS K/uL 298   NEUTROS ABS K/uL 3.5   LYMPHS ABS K/uL 1.6   MONOS ABS K/uL 1.00*   EOS ABS K/uL 0.06   BASOS ABS K/uL 0.10   IMMATURE GRANS (ABS) K/uL 0.0       No results for input(s): \"GLUCOSE\", \"NA\", \"K\", \"CO2\", \"CL\", \"ANIONGAP\", \"CREATININE\", \"BUN\", \"BCR\", \"CALCIUM\", \"EGFRIFNONA\", \"ALKPHOS\", \"PROTEINTOT\", \"ALT\", \"AST\", \"BILITOT\", \"ALBUMIN\", \"GLOB\" in the last 22442 hours.    Invalid input(s): \"LABIL\"    No results for input(s): \"MSPIKE\", \"KAPPALAMB\", \"IGLFLC\", \"URICACID\", \"FREEKAPPAL\", \"CEA\", \"LDH\", \"REFLABREPO\" in the last 70850 hours.    Lab Results - Last 18 Months   Lab Units 02/29/24  0859   TSH uIU/mL 2.460         PAST MEDICAL HISTORY:  ALLERGIES:  No Known Allergies  CURRENT MEDICATIONS:  Outpatient Encounter Medications as of 4/24/2024   Medication Sig Dispense Refill    albuterol sulfate  (90 Base) MCG/ACT inhaler Inhale 2 puffs Every 6 (Six) Hours As Needed.      loratadine (CLARITIN) 10 MG tablet Take 1 tablet by mouth Daily.      multivitamin (THERAGRAN) tablet tablet B Complex   1 tablet daily      multivitamin (THERAGRAN) tablet tablet       predniSONE (DELTASONE) 5 MG tablet Take 1 tablet by mouth Daily.       No facility-administered encounter medications on file as of 4/24/2024.     ADULT ILLNESSES:  Patient Active Problem List   Diagnosis Code    Rectal bleed K62.5     SURGERIES:  Past Surgical History:   Procedure Laterality Date    COLONOSCOPY N/A 4/17/2024    Procedure: COLONOSCOPY WITH ANESTHESIA;  Surgeon: Salty Arias DO;  Location: [facility] ENDOSCOPY;  Service: Gastroenterology;  Laterality: N/A;  preop; rectal bleed   postop questionable anal mass   PCP Duy Lomeli    HEMORROIDECTOMY      HERNIA REPAIR       HEALTH MAINTENANCE ITEMS:  Health Maintenance Due   Topic Date Due    BMI FOLLOWUP  Never " "done    TDAP/TD VACCINES (1 - Tdap) Never done    ZOSTER VACCINE (1 of 2) Never done    RSV Vaccine - Adults (1 - 1-dose 60+ series) Never done    Pneumococcal Vaccine 65+ (1 of 1 - PCV) Never done    COVID-19 Vaccine (4 - 2023-24 season) 09/01/2023    HEPATITIS C SCREENING  Never done    ANNUAL WELLNESS VISIT  Never done       <no information>  Last Completed Colonoscopy            Discontinued - COLORECTAL CANCER SCREENING  Discontinued        Frequency changed to Never automatically (Topic No Longer Applies)    04/17/2024  Colonoscopy    04/17/2024  Surgical Procedure: COLONOSCOPY                  Immunization History   Administered Date(s) Administered    COVID-19 (MODERNA) 1st,2nd,3rd Dose Monovalent 04/22/2021, 05/24/2021    COVID-19 (MODERNA) Monovalent Original Booster 01/04/2022     Last Completed Mammogram       This patient has no relevant Health Maintenance data.              FAMILY HISTORY:  Family History   Problem Relation Age of Onset    Colon cancer Neg Hx     Colon polyps Neg Hx     Esophageal cancer Neg Hx      SOCIAL HISTORY:  Social History     Socioeconomic History    Marital status:    Tobacco Use    Smoking status: Never    Smokeless tobacco: Never   Vaping Use    Vaping status: Never Used   Substance and Sexual Activity    Alcohol use: Never    Drug use: Never       REVIEW OF SYSTEMS:  Review of Systems   Constitutional:  Negative for chills, fatigue and fever.        \"I feel good.\"   HENT:  Negative for congestion and mouth sores.    Eyes:  Negative for redness and visual disturbance.   Respiratory:  Negative for shortness of breath and wheezing.    Cardiovascular:  Negative for chest pain and leg swelling.   Gastrointestinal:  Positive for anal bleeding and constipation. Negative for diarrhea, nausea and vomiting.        \"Pain in my butt.\"   Endocrine: Negative for cold intolerance and heat intolerance.   Genitourinary:  Negative for difficulty urinating and dysuria. " "  Musculoskeletal:  Negative for gait problem and joint swelling.   Skin:  Negative for pallor.   Allergic/Immunologic: Negative for food allergies.   Neurological:  Negative for speech difficulty, weakness and confusion.   Hematological:  Negative for adenopathy. Does not bruise/bleed easily.   Psychiatric/Behavioral:  Negative for agitation and hallucinations. The patient is not nervous/anxious.        /74   Pulse 84   Temp 97.4 °F (36.3 °C)   Resp 18   Ht 167.6 cm (66\")   Wt 108 kg (239 lb)   SpO2 96%   BMI 38.58 kg/m²  Body surface area is 2.15 meters squared. BP elevated. I am scared.\"  Pain Score    04/24/24 1303   PainSc:   2       Physical Exam:  Physical Exam  Vitals reviewed.   Constitutional:       General: He is not in acute distress.  HENT:      Head: Normocephalic and atraumatic.   Eyes:      General: No scleral icterus.  Cardiovascular:      Rate and Rhythm: Normal rate.   Pulmonary:      Effort: No respiratory distress.      Breath sounds: No wheezing.   Abdominal:      General: Bowel sounds are normal.      Palpations: Abdomen is soft.      Tenderness: There is no abdominal tenderness.   Musculoskeletal:         General: No swelling.      Cervical back: Neck supple.   Skin:     General: Skin is warm.      Coloration: Skin is not pale.   Neurological:      Mental Status: He is alert and oriented to person, place, and time.   Psychiatric:         Mood and Affect: Mood normal.         Behavior: Behavior normal.         Thought Content: Thought content normal.         Judgment: Judgment normal.         Antwan Stevenson reports a pain score of 2.  Given his pain assessment as noted, treatment options were discussed and the following options were decided upon as a follow-up plan to address the patient's pain: continuation of current treatment plan for pain.      ASSESSMENT:  Adenocarcinoma the rectum.  AJCC stage: Pending staging CT chest and pelvic MRI.  Treatment status: Pending. Anticipate " neoadjuvant FOLFOX followed by CIV 5FU with radiation.   2.  Performance status of 0.  3.  Bibasilar pulmonary micronodules measuring up to 0.4 cm on 4/11/2024.        PLAN:   regarding the reason for the referral.  2.   regarding staging for rectal cancer.  3.  Order MMR/MSI on pathology SP [id number].  4.  Order CT of the chest and pelvic MRI for staging.  5.  Blood for CBC with differential, CMP and CEA.  6.  Anticipate total neoadjuvant FOLFOX to be followed by neoadjuvant CIV 5-FU with radiation.   7.  To see Dr. Kaylie Stewart 5/1/2024.   8.  Refer to general surgery for port placement.  9.  Neoadjuvant FOLFOX for 12 cycles:  Aware of potential nausea, vomiting, diarrhea, cold intolerance, myelosuppression, anaphylaxis, alopecia or neuropathy.  10.  Schedule treatment C1 D1 to 3 and C2 D1 to D3 start after port.  To be administered every 2 weeks: Oxaliplatin 85 mg/m2 IVPB.  Leucovorin 400 mg/m2 .  5- mg/ m2  IVP.  Begin 5-FU 2,400 mg/m2 IVPB over 46 hours.    11.    Premedicate with:  Aloxi 0.25 mg IVP.  Emend 150 mg IV.  Decadron 12 mg IVPB over 20-30 min.  12.   Weekly CBC with differential, magnesium, and CMP with chemo.  13.  eRx Zofran 8 mg p.o. every 8 hours as needed for nausea and vomiting #60, 2 refills.  14.  eRx Compazine 10 mg p.o. every 4 hours as needed for nausea and vomiting #60, 2 refills.  15.  Continue care per primary care physician and other specialists.  16.  Plan of care discussed with patient.  Understanding expressed.  Patient agreeable to proceed.  17.  Chemo education.  18.  Return to office in 3 weeks.  19.  Further recommendations pending.  20. Advance Care Planning   ACP discussion was declined by the patient. Patient does not have an advance directive, information provided.         Thank you for the referral.      I have reviewed the assessment and plan and verified the accuracy of it. No changes to assessment and plan since the information was documented.  Raul Mayberry MD 04/24/24       I spent 66 total minutes, face-to-face, caring for Antwan today. Greater than 50% of this time involved counseling and/or coordination of care as documented within this note.       MD Salty Crooks DO Roberta Muldoon, MD Austin Beck, MD

## 2024-04-18 NOTE — TELEPHONE ENCOUNTER
Referral entered for oncology at [facility] as well as colorectal at [facility] for new diagnosis of rectal cancer

## 2024-04-22 ENCOUNTER — HOSPITAL ENCOUNTER (OUTPATIENT)
Dept: GENERAL RADIOLOGY | Age: 64
Discharge: HOME | End: 2024-04-22
Payer: MEDICARE

## 2024-04-22 ENCOUNTER — OFFICE VISIT (OUTPATIENT)
Dept: UROLOGY | Age: 64
End: 2024-04-22

## 2024-04-22 VITALS — BODY MASS INDEX: 38.39 KG/M2 | HEIGHT: 67 IN | TEMPERATURE: 97.5 F | WEIGHT: 244.6 LBS

## 2024-04-22 DIAGNOSIS — N20.0 BILATERAL RENAL STONES: ICD-10-CM

## 2024-04-22 DIAGNOSIS — R31.29 MICROSCOPIC HEMATURIA: Primary | ICD-10-CM

## 2024-04-22 DIAGNOSIS — N20.0 KIDNEY STONE: ICD-10-CM

## 2024-04-22 LAB
APPEARANCE FLUID: CLEAR
BACTERIA URINE, POC: 0
BILIRUBIN URINE: 0 MG/DL
BILIRUBIN, POC: NORMAL
BLOOD URINE, POC: NORMAL
BLOOD, URINE: POSITIVE
CASTS URINE, POC: 0
CLARITY, POC: CLEAR
CLARITY: CLEAR
COLOR, POC: YELLOW
COLOR: YELLOW
CRYSTALS URINE, POC: 0
EPI CELLS URINE, POC: 0
GLUCOSE URINE, POC: NORMAL
GLUCOSE URINE: NORMAL
KETONES, POC: NORMAL
KETONES, URINE: NEGATIVE
LEUKOCYTE EST, POC: NORMAL
LEUKOCYTE EST, POC: NORMAL
NITRITE, POC: NORMAL
NITRITE, URINE: NEGATIVE
PH UA: 5.5 (ref 4.5–8)
PH, POC: 5.5
PROTEIN UA: NEGATIVE
PROTEIN, POC: NORMAL
RBC URINE, POC: 3
SPECIFIC GRAVITY UA: 1.02 (ref 1–1.03)
SPECIFIC GRAVITY, POC: 1.01
UROBILINOGEN, POC: 0.2
UROBILINOGEN, URINE: NORMAL
WBC URINE, POC: 0
YEAST URINE, POC: 0

## 2024-04-22 PROCEDURE — 74018 RADEX ABDOMEN 1 VIEW: CPT

## 2024-04-22 NOTE — PROGRESS NOTES
evaluated.  Will obtain contrasted CT delayed imaging and have patient follow-up.  I did recommend a cystoscopy although patient is refusing at this point.  Recently diagnosed with rectal cancer this week.  Will have him follow-up with a contrasted CT in office.  Will also send his urine for cytology.    - CT ABDOMEN PELVIS W IV CONTRAST Additional Contrast? Radiologist Recommendation (urogram protocol); Future  - POCT Urinalysis no Micro  - POCT Urinalysis Dipstick w/ Micro (Auto)  - Cytology, Non-Gyn    2. Bilateral renal stones  Bilateral renal stones we discussed ESWL although at this time patient wants to hold off.  Unlikely to pass the stones on his own if they were to move.      Orders Placed This Encounter   Procedures    CT ABDOMEN PELVIS W IV CONTRAST Additional Contrast? Radiologist Recommendation (urogram protocol)     Already had CT without. Just need CT with and delayed     Standing Status:   Future     Standing Expiration Date:   4/22/2025     Order Specific Question:   Additional Contrast?     Answer:   Radiologist Recommendation     Comments:   urogram protocol     Order Specific Question:   STAT Creatinine as needed:     Answer:   Yes     Order Specific Question:   Reason for exam:     Answer:   microscopic hematuria.    Cytology, Non-Gyn     Order Specific Question:   PREVIOUS BIOPSY     Answer:   No     Order Specific Question:   PREOP DIAGNOSIS     Answer:   HEMATURIA     Order Specific Question:   FROZEN SECTION - NO OR YES/SPECIMEN     Answer:   No    Cytology, Non-Gyn    POCT Urinalysis no Micro    POCT Urinalysis Dipstick w/ Micro (Auto)        Return in about 4 weeks (around 5/20/2024) for CT prior.    All information inputted into the note by the MA to include chief complaint, past medical history, past surgical history, medications, allergies, social and family history and review of systems has been reviewed and updated as needed by me.    EMR Dragon/transcription disclaimer: Much of this

## 2024-04-24 ENCOUNTER — LAB (OUTPATIENT)
Dept: LAB | Facility: HOSPITAL | Age: 64
End: 2024-04-24
Payer: MEDICARE

## 2024-04-24 ENCOUNTER — CONSULT (OUTPATIENT)
Dept: ONCOLOGY | Facility: CLINIC | Age: 64
End: 2024-04-24
Payer: MEDICARE

## 2024-04-24 VITALS
HEIGHT: 66 IN | OXYGEN SATURATION: 96 % | TEMPERATURE: 97.4 F | HEART RATE: 84 BPM | DIASTOLIC BLOOD PRESSURE: 74 MMHG | SYSTOLIC BLOOD PRESSURE: 168 MMHG | WEIGHT: 239 LBS | BODY MASS INDEX: 38.41 KG/M2 | RESPIRATION RATE: 18 BRPM

## 2024-04-24 DIAGNOSIS — C20 RECTAL CANCER: ICD-10-CM

## 2024-04-24 DIAGNOSIS — K62.5 RECTAL BLEED: Primary | ICD-10-CM

## 2024-04-24 LAB
ALBUMIN SERPL-MCNC: 4.2 G/DL (ref 3.5–5.2)
ALBUMIN/GLOB SERPL: 1.1 G/DL
ALP SERPL-CCNC: 89 U/L (ref 39–117)
ALT SERPL W P-5'-P-CCNC: 16 U/L (ref 1–41)
ANION GAP SERPL CALCULATED.3IONS-SCNC: 10 MMOL/L (ref 5–15)
AST SERPL-CCNC: 21 U/L (ref 1–40)
BASOPHILS # BLD MANUAL: 0.1 10*3/MM3 (ref 0–0.2)
BASOPHILS NFR BLD MANUAL: 2.1 % (ref 0–1.5)
BILIRUB SERPL-MCNC: 0.5 MG/DL (ref 0–1.2)
BUN SERPL-MCNC: 10 MG/DL (ref 8–23)
BUN/CREAT SERPL: 11.1 (ref 7–25)
CALCIUM SPEC-SCNC: 9.4 MG/DL (ref 8.6–10.5)
CHLORIDE SERPL-SCNC: 103 MMOL/L (ref 98–107)
CO2 SERPL-SCNC: 25 MMOL/L (ref 22–29)
CREAT SERPL-MCNC: 0.9 MG/DL (ref 0.76–1.27)
DEPRECATED RDW RBC AUTO: 40.2 FL (ref 37–54)
EGFRCR SERPLBLD CKD-EPI 2021: 94.8 ML/MIN/1.73
ERYTHROCYTE [DISTWIDTH] IN BLOOD BY AUTOMATED COUNT: 13.2 % (ref 12.3–15.4)
GLOBULIN UR ELPH-MCNC: 3.7 GM/DL
GLUCOSE SERPL-MCNC: 98 MG/DL (ref 65–99)
HCT VFR BLD AUTO: 42.7 % (ref 37.5–51)
HGB BLD-MCNC: 13.3 G/DL (ref 13–17.7)
LYMPHOCYTES # BLD MANUAL: 1.7 10*3/MM3 (ref 0.7–3.1)
LYMPHOCYTES NFR BLD MANUAL: 12.4 % (ref 5–12)
MCH RBC QN AUTO: 25.9 PG (ref 26.6–33)
MCHC RBC AUTO-ENTMCNC: 31.1 G/DL (ref 31.5–35.7)
MCV RBC AUTO: 83.1 FL (ref 79–97)
MONOCYTES # BLD: 0.6 10*3/MM3 (ref 0.1–0.9)
NEUTROPHILS # BLD AUTO: 2.46 10*3/MM3 (ref 1.7–7)
NEUTROPHILS NFR BLD MANUAL: 47.4 % (ref 42.7–76)
NEUTS BAND NFR BLD MANUAL: 3.1 % (ref 0–5)
PLAT MORPH BLD: NORMAL
PLATELET # BLD AUTO: 239 10*3/MM3 (ref 140–450)
PMV BLD AUTO: 9.8 FL (ref 6–12)
POTASSIUM SERPL-SCNC: 4.4 MMOL/L (ref 3.5–5.2)
PROT SERPL-MCNC: 7.9 G/DL (ref 6–8.5)
RBC # BLD AUTO: 5.14 10*6/MM3 (ref 4.14–5.8)
RBC MORPH BLD: NORMAL
SODIUM SERPL-SCNC: 138 MMOL/L (ref 136–145)
VARIANT LYMPHS NFR BLD MANUAL: 14.4 % (ref 19.6–45.3)
VARIANT LYMPHS NFR BLD MANUAL: 20.6 % (ref 0–5)
WBC MORPH BLD: NORMAL
WBC NRBC COR # BLD AUTO: 4.86 10*3/MM3 (ref 3.4–10.8)

## 2024-04-24 PROCEDURE — 36415 COLL VENOUS BLD VENIPUNCTURE: CPT

## 2024-04-24 PROCEDURE — 80053 COMPREHEN METABOLIC PANEL: CPT

## 2024-04-24 PROCEDURE — 82378 CARCINOEMBRYONIC ANTIGEN: CPT

## 2024-04-24 PROCEDURE — 85025 COMPLETE CBC W/AUTO DIFF WBC: CPT

## 2024-04-24 PROCEDURE — 85007 BL SMEAR W/DIFF WBC COUNT: CPT

## 2024-04-24 RX ORDER — ONDANSETRON HYDROCHLORIDE 8 MG/1
8 TABLET, FILM COATED ORAL EVERY 8 HOURS PRN
Qty: 60 TABLET | Refills: 2 | Status: SHIPPED | OUTPATIENT
Start: 2024-04-24

## 2024-04-24 RX ORDER — PROCHLORPERAZINE MALEATE 10 MG
10 TABLET ORAL EVERY 4 HOURS PRN
Qty: 60 TABLET | Refills: 2 | Status: SHIPPED | OUTPATIENT
Start: 2024-04-24

## 2024-04-24 NOTE — LETTER
April 24, 2024       No Recipients    Patient: Antwan Stevenson   YOB: 1960   Date of Visit: 4/24/2024     Dear Duy Lomeli MD:       Thank you for referring Antwan Stevenson to me for evaluation. Below are the relevant portions of my assessment and plan of care.    If you have questions, please do not hesitate to call me. I look forward to following Antwan along with you.         Sincerely,        Raul Mayberry MD        CC:   No Recipients    Raul Mayberry MD  04/24/24 1335  Sign when Signing Visit  [facility] HEMATOLOGY & ONCOLOGY  [address]  [place] [facility] [place] [address]  [phone]    Patient Name: Antwan Stevenson  Encounter Date: 04/24/2024  YOB: 1960  Patient Number: [id number]    Initial Note    REASON FOR CONSULTATION: Antwan Stevenson is a pleasant 63 y.o.   male referred by CHERYLE Lopez* for management recommendations for rectal cancer, staging workup pending.  He is seen alone. History is obtained from patient. History is considered to be accurate.      HISTORY OF PRESENT ILLNESS: He presented with hematochezia.    Patient seen by DESTIN Martinez 3/20/2024.  He reports frequent rectal bleeding.  Patient scheduled for colonoscopy.    CT abdomen pelvis 4/11/2024.Bilateral nephrolithiasis without obstructive uropathy.   Cholelithiasis.   Left pelvic spigelian hernia containing a portion of sigmoid colon.  No bowel obstruction or inflammation. Fatty supraumbilical and bilateral inguinal hernias present as well.  Evidence of prior umbilical hernia repair.  Bibasilar pulmonary micronodules measuring up to 0.4 cm.  Chest CT follow-up in 12 months recommended unless shorter interval follow-up is warranted by history.     Colonoscopy 4/17/2024.  Anal mass. The colon was normal from the rectum to the mid transverse, as I had to use an endoscope due to the severe anal stenosis.  No specimens collected.  The  "digital rectal exam revealed a firm, hard and obstructing anal mass. The mass was circumferential. Findings: The entire examined colon appeared normal    Pathology report 4/18/2024. 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    Phone note 4/18/2024.  Patient referred to oncology and colorectal surgery at [facility].    Family history negative for colorectal cancer.    Her had no prior colonoscopy.     Father had prostate cancer. No family history of colorectal cancer.         LABS    Lab Results - Last 18 Months   Lab Units 02/29/24  0859   HEMOGLOBIN g/dL 14.2   HEMATOCRIT % 46.0   MCV fL 84.4   WBC K/uL 6.2   RDW % 13.8   MPV fL 10.1   PLATELETS K/uL 298   NEUTROS ABS K/uL 3.5   LYMPHS ABS K/uL 1.6   MONOS ABS K/uL 1.00*   EOS ABS K/uL 0.06   BASOS ABS K/uL 0.10   IMMATURE GRANS (ABS) K/uL 0.0       No results for input(s): \"GLUCOSE\", \"NA\", \"K\", \"CO2\", \"CL\", \"ANIONGAP\", \"CREATININE\", \"BUN\", \"BCR\", \"CALCIUM\", \"EGFRIFNONA\", \"ALKPHOS\", \"PROTEINTOT\", \"ALT\", \"AST\", \"BILITOT\", \"ALBUMIN\", \"GLOB\" in the last 27875 hours.    Invalid input(s): \"LABIL\"    No results for input(s): \"MSPIKE\", \"KAPPALAMB\", \"IGLFLC\", \"URICACID\", \"FREEKAPPAL\", \"CEA\", \"LDH\", \"REFLABREPO\" in the last 63148 hours.    Lab Results - Last 18 Months   Lab Units 02/29/24  0859   TSH uIU/mL 2.460         PAST MEDICAL HISTORY:  ALLERGIES:  No Known Allergies  CURRENT MEDICATIONS:  Outpatient Encounter Medications as of 4/24/2024   Medication Sig Dispense Refill   • albuterol sulfate  (90 Base) MCG/ACT inhaler Inhale 2 puffs Every 6 (Six) Hours As Needed.     • loratadine (CLARITIN) 10 MG tablet Take 1 tablet by mouth Daily.     • multivitamin (THERAGRAN) tablet tablet B Complex   1 tablet daily     • multivitamin (THERAGRAN) tablet tablet      • predniSONE (DELTASONE) 5 MG tablet Take 1 tablet by mouth Daily.       No facility-administered encounter medications on file as of 4/24/2024.     ADULT " ILLNESSES:  Patient Active Problem List   Diagnosis Code   • Rectal bleed K62.5     SURGERIES:  Past Surgical History:   Procedure Laterality Date   • COLONOSCOPY N/A 4/17/2024    Procedure: COLONOSCOPY WITH ANESTHESIA;  Surgeon: Salty Arias DO;  Location: [facility] ENDOSCOPY;  Service: Gastroenterology;  Laterality: N/A;  preop; rectal bleed   postop questionable anal mass   PCP Duy Lomeli   • HEMORROIDECTOMY     • HERNIA REPAIR       HEALTH MAINTENANCE ITEMS:  Health Maintenance Due   Topic Date Due   • BMI FOLLOWUP  Never done   • TDAP/TD VACCINES (1 - Tdap) Never done   • ZOSTER VACCINE (1 of 2) Never done   • RSV Vaccine - Adults (1 - 1-dose 60+ series) Never done   • Pneumococcal Vaccine 65+ (1 of 1 - PCV) Never done   • COVID-19 Vaccine (4 - 2023-24 season) 09/01/2023   • HEPATITIS C SCREENING  Never done   • ANNUAL WELLNESS VISIT  Never done       <no information>  Last Completed Colonoscopy            Discontinued - COLORECTAL CANCER SCREENING  Discontinued        Frequency changed to Never automatically (Topic No Longer Applies)    04/17/2024  Colonoscopy    04/17/2024  Surgical Procedure: COLONOSCOPY                  Immunization History   Administered Date(s) Administered   • COVID-19 (MODERNA) 1st,2nd,3rd Dose Monovalent 04/22/2021, 05/24/2021   • COVID-19 (MODERNA) Monovalent Original Booster 01/04/2022     Last Completed Mammogram       This patient has no relevant Health Maintenance data.              FAMILY HISTORY:  Family History   Problem Relation Age of Onset   • Colon cancer Neg Hx    • Colon polyps Neg Hx    • Esophageal cancer Neg Hx      SOCIAL HISTORY:  Social History     Socioeconomic History   • Marital status:    Tobacco Use   • Smoking status: Never   • Smokeless tobacco: Never   Vaping Use   • Vaping status: Never Used   Substance and Sexual Activity   • Alcohol use: Never   • Drug use: Never       REVIEW OF SYSTEMS:  Review of Systems   Constitutional:  Negative for  "chills, fatigue and fever.        \"I feel good.\"   HENT:  Negative for congestion and mouth sores.    Eyes:  Negative for redness and visual disturbance.   Respiratory:  Negative for shortness of breath and wheezing.    Cardiovascular:  Negative for chest pain and leg swelling.   Gastrointestinal:  Positive for anal bleeding and constipation. Negative for diarrhea, nausea and vomiting.        \"Pain in my butt.\"   Endocrine: Negative for cold intolerance and heat intolerance.   Genitourinary:  Negative for difficulty urinating and dysuria.   Musculoskeletal:  Negative for gait problem and joint swelling.   Skin:  Negative for pallor.   Allergic/Immunologic: Negative for food allergies.   Neurological:  Negative for speech difficulty, weakness and confusion.   Hematological:  Negative for adenopathy. Does not bruise/bleed easily.   Psychiatric/Behavioral:  Negative for agitation and hallucinations. The patient is not nervous/anxious.        /74   Pulse 84   Temp 97.4 °F (36.3 °C)   Resp 18   Ht 167.6 cm (66\")   Wt 108 kg (239 lb)   SpO2 96%   BMI 38.58 kg/m²  Body surface area is 2.15 meters squared. BP elevated. I am scared.\"  Pain Score    04/24/24 1303   PainSc:   2       Physical Exam:  Physical Exam  Vitals reviewed.   Constitutional:       General: He is not in acute distress.  HENT:      Head: Normocephalic and atraumatic.   Eyes:      General: No scleral icterus.  Cardiovascular:      Rate and Rhythm: Normal rate.   Pulmonary:      Effort: No respiratory distress.      Breath sounds: No wheezing.   Abdominal:      General: Bowel sounds are normal.      Palpations: Abdomen is soft.      Tenderness: There is no abdominal tenderness.   Musculoskeletal:         General: No swelling.      Cervical back: Neck supple.   Skin:     General: Skin is warm.      Coloration: Skin is not pale.   Neurological:      Mental Status: He is alert and oriented to person, place, and time.   Psychiatric:         Mood " and Affect: Mood normal.         Behavior: Behavior normal.         Thought Content: Thought content normal.         Judgment: Judgment normal.         Antwan Stevenson reports a pain score of 2.  Given his pain assessment as noted, treatment options were discussed and the following options were decided upon as a follow-up plan to address the patient's pain: continuation of current treatment plan for pain.      ASSESSMENT:  Adenocarcinoma the rectum.  AJCC stage: Pending staging CT chest and pelvic MRI.  Treatment status: Pending. Anticipate neoadjuvant FOLFOX followed by CIV 5FU with radiation.   2.  Performance status of 0.  3.  Bibasilar pulmonary micronodules measuring up to 0.4 cm on 4/11/2024.        PLAN:   regarding the reason for the referral.  2.   regarding staging for rectal cancer.  3.  Order MMR/MSI on pathology SP [id number].  4.  Order CT of the chest and pelvic MRI for staging.  5.  Blood for CBC with differential, CMP and CEA.  6.  Anticipate total neoadjuvant FOLFOX to be followed by neoadjuvant CIV 5-FU with radiation.   7.  To see Dr. Kaylie Stewart 5/1/2024.   8.  Refer to general surgery for port placement.  9.  Neoadjuvant FOLFOX for 12 cycles:  Aware of potential nausea, vomiting, diarrhea, cold intolerance, myelosuppression, anaphylaxis, alopecia or neuropathy.  10.  Schedule treatment C1 D1 to 3 and C2 D1 to D3 start after port.  To be administered every 2 weeks: Oxaliplatin 85 mg/m2 IVPB.  Leucovorin 400 mg/m2 .  5- mg/ m2  IVP.  Begin 5-FU 2,400 mg/m2 IVPB over 46 hours.    11.    Premedicate with:  Aloxi 0.25 mg IVP.  Emend 150 mg IV.  Decadron 12 mg IVPB over 20-30 min.  12.   Weekly CBC with differential, magnesium, and CMP with chemo.  13.  eRx Zofran 8 mg p.o. every 8 hours as needed for nausea and vomiting #60, 2 refills.  14.  eRx Compazine 10 mg p.o. every 4 hours as needed for nausea and vomiting #60, 2 refills.  15.  Continue care per primary care physician and  other specialists.  16.  Plan of care discussed with patient.  Understanding expressed.  Patient agreeable to proceed.  17.  Chemo education.  18.  Return to office in 3 weeks.  19.  Further recommendations pending.  20. Advance Care Planning  ACP discussion was declined by the patient. Patient does not have an advance directive, information provided.         Thank you for the referral.      I have reviewed the assessment and plan and verified the accuracy of it. No changes to assessment and plan since the information was documented. Raul Mayberry MD 04/24/24       I spent 66 total minutes, face-to-face, caring for Antwan today. Greater than 50% of this time involved counseling and/or coordination of care as documented within this note.       MD Salty Crooks DO Roberta Muldoon, MD Austin Beck, MD

## 2024-04-25 ENCOUNTER — HOSPITAL ENCOUNTER (OUTPATIENT)
Dept: MRI IMAGING | Facility: HOSPITAL | Age: 64
Discharge: HOME | End: 2024-04-25
Admitting: INTERNAL MEDICINE
Payer: MEDICARE

## 2024-04-25 DIAGNOSIS — C20 RECTAL CANCER: Primary | ICD-10-CM

## 2024-04-25 DIAGNOSIS — C20 RECTAL CANCER: ICD-10-CM

## 2024-04-25 DIAGNOSIS — K62.5 RECTAL BLEED: ICD-10-CM

## 2024-04-25 LAB — CEA SERPL-MCNC: 2.1 NG/ML

## 2024-04-25 PROCEDURE — 0 GADOBENATE DIMEGLUMINE 529 MG/ML SOLUTION: Performed by: INTERNAL MEDICINE

## 2024-04-25 PROCEDURE — 72197 MRI PELVIS W/O & W/DYE: CPT

## 2024-04-25 PROCEDURE — A9577 INJ MULTIHANCE: HCPCS | Performed by: INTERNAL MEDICINE

## 2024-04-25 RX ORDER — HYDROCODONE BITARTRATE AND ACETAMINOPHEN 5; 325 MG/1; MG/1
1 TABLET ORAL EVERY 6 HOURS PRN
Qty: 100 TABLET | Refills: 0 | Status: SHIPPED | OUTPATIENT
Start: 2024-04-25

## 2024-04-25 RX ADMIN — GADOBENATE DIMEGLUMINE 20 ML: 529 INJECTION, SOLUTION INTRAVENOUS at 12:30

## 2024-04-25 NOTE — TELEPHONE ENCOUNTER
----- Message from Raul Mayberry MD sent at 4/25/2024  3:58 PM CDT -----  4.5 cm circular solid lower rectum/anal canal neoplasm without evidence  of mucinous features. There is more inferior extent involving the anal  canal at the 12:00/12:30 position and 6:00/6:30 position with  involvement of the most inferior aspect of the right external anal  sphincter. At the level of the anorectal junction there is also  posterior extramural extent of tumor into the intersphincteric space  abutting and likely involving the external anal sphincter.     2 indeterminate right mesorectal lymph nodes.

## 2024-04-26 DIAGNOSIS — C20 RECTAL CANCER: Primary | ICD-10-CM

## 2024-04-27 ASSESSMENT — ENCOUNTER SYMPTOMS
BACK PAIN: 0
ABDOMINAL DISTENTION: 0
ABDOMINAL PAIN: 0
NAUSEA: 0
VOMITING: 0

## 2024-04-29 ENCOUNTER — OFFICE VISIT (OUTPATIENT)
Dept: SURGERY | Facility: CLINIC | Age: 64
End: 2024-04-29
Payer: MEDICARE

## 2024-04-29 ENCOUNTER — PREP FOR SURGERY (OUTPATIENT)
Dept: OTHER | Facility: HOSPITAL | Age: 64
End: 2024-04-29
Payer: MEDICARE

## 2024-04-29 VITALS
HEART RATE: 87 BPM | OXYGEN SATURATION: 99 % | DIASTOLIC BLOOD PRESSURE: 82 MMHG | WEIGHT: 241.4 LBS | HEIGHT: 66 IN | BODY MASS INDEX: 38.8 KG/M2 | SYSTOLIC BLOOD PRESSURE: 138 MMHG

## 2024-04-29 DIAGNOSIS — C20 RECTAL CANCER: ICD-10-CM

## 2024-04-29 DIAGNOSIS — Z45.2 ENCOUNTER FOR CARE RELATED TO PORT-A-CATH: Primary | ICD-10-CM

## 2024-04-29 PROCEDURE — 99204 OFFICE O/P NEW MOD 45 MIN: CPT

## 2024-04-29 PROCEDURE — 1160F RVW MEDS BY RX/DR IN RCRD: CPT

## 2024-04-29 PROCEDURE — 1159F MED LIST DOCD IN RCRD: CPT

## 2024-04-29 NOTE — H&P (VIEW-ONLY)
Office New Patient History and Physical:     Referring Provider: Raul Mayberry MD    Chief Complaint   Patient presents with    Follow-up     Patient here for evaluation of port placement        Subjective .     History of present illness:  Antwan Stevenson is a 64 y.o. male who is here for discussion of port placement due to rectal cancer. Dr. Mayberry has not given the patient a start date for chemotherapy. He has never had a port before. He does not take any blood thinners. He is a nonsmoker. BMI is 38.98. Plays [employer] and wears a strap on the L shoulder so would like the port to be placed on the R side.     Review of Systems    Review of Systems - General ROS: negative  ENT ROS: negative  Respiratory ROS: no cough, shortness of breath, or wheezing  Cardiovascular ROS: no chest pain or dyspnea on exertion  Gastrointestinal ROS: no abdominal pain, change in bowel habits, or black or bloody stools  Genito-Urinary ROS: no dysuria, trouble voiding, or hematuria  Dermatological ROS: negative   Breast ROS: negative for breast lumps  Hematological and Lymphatic ROS: negative  Musculoskeletal ROS: negative   Neurological ROS: no TIA or stroke symptoms    Psychological ROS: negative  Endocrine ROS: negative    History  History reviewed. No pertinent past medical history.,   Past Surgical History:   Procedure Laterality Date    COLONOSCOPY N/A 4/17/2024    Procedure: COLONOSCOPY WITH ANESTHESIA;  Surgeon: Salty Arias DO;  Location: [facility] ENDOSCOPY;  Service: Gastroenterology;  Laterality: N/A;  preop; rectal bleed   postop questionable anal mass   PCP Duy Lomeli    HEMORROIDECTOMY      HERNIA REPAIR     ,   Family History   Problem Relation Age of Onset    Colon cancer Neg Hx     Colon polyps Neg Hx     Esophageal cancer Neg Hx    ,   Social History     Tobacco Use    Smoking status: Never    Smokeless tobacco: Never   Vaping Use    Vaping status: Never Used   Substance Use Topics    Alcohol use: Never    Drug use: Never  "  , (Not in a hospital admission)   and Allergies:  Patient has no known allergies.    Current Outpatient Medications:     albuterol sulfate  (90 Base) MCG/ACT inhaler, Inhale 2 puffs Every 6 (Six) Hours As Needed., Disp: , Rfl:     HYDROcodone-acetaminophen (NORCO) 5-325 MG per tablet, Take 1 tablet by mouth Every 6 (Six) Hours As Needed for Mild Pain or Moderate Pain., Disp: 100 tablet, Rfl: 0    loratadine (CLARITIN) 10 MG tablet, Take 1 tablet by mouth Daily., Disp: , Rfl:     multivitamin (THERAGRAN) tablet tablet, B Complex  1 tablet daily, Disp: , Rfl:     ondansetron (Zofran) 8 MG tablet, Take 1 tablet by mouth Every 8 (Eight) Hours As Needed for Nausea or Vomiting., Disp: 60 tablet, Rfl: 2    predniSONE (DELTASONE) 5 MG tablet, Take 1 tablet by mouth Daily., Disp: , Rfl:     prochlorperazine (COMPAZINE) 10 MG tablet, Take 1 tablet by mouth Every 4 (Four) Hours As Needed for Nausea or Vomiting., Disp: 60 tablet, Rfl: 2    Objective     Vital Signs   /82 (BP Location: Left arm, Patient Position: Sitting, Cuff Size: Adult)   Pulse 87   Ht 167.6 cm (65.98\")   Wt 109 kg (241 lb 6.4 oz)   SpO2 99%   BMI 38.98 kg/m²      Physical Exam:  General appearance - alert, well appearing, and in no distress and oriented to person, place, and time  Mental status - alert, oriented to person, place, and time, normal mood, behavior, speech, dress, motor activity, and thought processes  Eyes - sclera anicteric  Neck - supple, no significant adenopathy  Chest - no tachypnea, retractions or cyanosis  Heart - normal rate and regular rhythm  Neurological - alert, oriented, normal speech, no focal findings or movement disorder noted  Skin - normal coloration and turgor, no rashes, no suspicious skin lesions noted    Results Review:  Result Review :            Assessment & Plan       Diagnoses and all orders for this visit:    1. Encounter for care related to Port-a-Cath (Primary)  -     Case Request; Standing  -    "  MRSA Screen Culture (Outpatient) - Swab, Nares; Future  -     XR chest 1 vw; Future  -     ECG 12 Lead; Future  -     Case Request    2. Rectal cancer  -     Case Request; Standing  -     MRSA Screen Culture (Outpatient) - Swab, Nares; Future  -     XR chest 1 vw; Future  -     ECG 12 Lead; Future  -     Case Request    Other orders  -     Follow Anesthesia Guidelines / Protocol; Future  -     Follow Anesthesia Guidelines / Protocol; Standing  -     Verify / Perform Chlorhexidine Skin Prep; Standing  -     Verify / Perform Chlorhexidine Skin Prep if Indicated (If Not Already Completed); Standing  -     Obtain Informed Consent; Future  -     Provide NPO Instructions to Patient; Future  -     Chlorhexidine Skin Prep; Future  -     Notify physician (specify); Standing  -     Instructions on coughing, deep breathing, and incentive spirometry.; Standing  -     Oxygen Therapy-; Standing  -     Place Sequential Compression Device; Standing  -     Maintain Sequential Compression Device; Standing         Antwan Stevenson is a 64 y.o. male with a need for port placement for rectal. After a discussion of risks (including bleeding, port infection with need for removal, damage to surrounding structures including the arteries, pneumothorax and possible port malfunction) and benefits, the patient wishes to proceed with single lumen port placement with fluoroscopy. The patient is currently scheduled for this procedure on 5/9/24 at 1000, with arrival at 0800 that morning.     I also discussed with the patient post-operative pain management including multimodal pain control utilizing Tylenol, ibuprofen, and tramadol for breakthrough pain. I will plan to given the patient 5 tabs of 50mg Ultram post-operatively for break through pain.     Follow up:     Class 2 Severe Obesity (BMI >=35 and <=39.9).     Return if symptoms worsen or fail to improve.        Vera Cueva PA-C  04/29/24  10:39 CDT

## 2024-04-29 NOTE — PROGRESS NOTES
Office New Patient History and Physical:     Referring Provider: Raul Mayberry MD    Chief Complaint   Patient presents with    Follow-up     Patient here for evaluation of port placement        Subjective .     History of present illness:  Antwan Stevenson is a 64 y.o. male who is here for discussion of port placement due to rectal cancer. Dr. Mayberry has not given the patient a start date for chemotherapy. He has never had a port before. He does not take any blood thinners. He is a nonsmoker. BMI is 38.98. Plays [employer] and wears a strap on the L shoulder so would like the port to be placed on the R side.     Review of Systems    Review of Systems - General ROS: negative  ENT ROS: negative  Respiratory ROS: no cough, shortness of breath, or wheezing  Cardiovascular ROS: no chest pain or dyspnea on exertion  Gastrointestinal ROS: no abdominal pain, change in bowel habits, or black or bloody stools  Genito-Urinary ROS: no dysuria, trouble voiding, or hematuria  Dermatological ROS: negative   Breast ROS: negative for breast lumps  Hematological and Lymphatic ROS: negative  Musculoskeletal ROS: negative   Neurological ROS: no TIA or stroke symptoms    Psychological ROS: negative  Endocrine ROS: negative    History  History reviewed. No pertinent past medical history.,   Past Surgical History:   Procedure Laterality Date    COLONOSCOPY N/A 4/17/2024    Procedure: COLONOSCOPY WITH ANESTHESIA;  Surgeon: Salty Arias DO;  Location: [facility] ENDOSCOPY;  Service: Gastroenterology;  Laterality: N/A;  preop; rectal bleed   postop questionable anal mass   PCP Duy Lomeli    HEMORROIDECTOMY      HERNIA REPAIR     ,   Family History   Problem Relation Age of Onset    Colon cancer Neg Hx     Colon polyps Neg Hx     Esophageal cancer Neg Hx    ,   Social History     Tobacco Use    Smoking status: Never    Smokeless tobacco: Never   Vaping Use    Vaping status: Never Used   Substance Use Topics    Alcohol use: Never    Drug use: Never  "  , (Not in a hospital admission)   and Allergies:  Patient has no known allergies.    Current Outpatient Medications:     albuterol sulfate  (90 Base) MCG/ACT inhaler, Inhale 2 puffs Every 6 (Six) Hours As Needed., Disp: , Rfl:     HYDROcodone-acetaminophen (NORCO) 5-325 MG per tablet, Take 1 tablet by mouth Every 6 (Six) Hours As Needed for Mild Pain or Moderate Pain., Disp: 100 tablet, Rfl: 0    loratadine (CLARITIN) 10 MG tablet, Take 1 tablet by mouth Daily., Disp: , Rfl:     multivitamin (THERAGRAN) tablet tablet, B Complex  1 tablet daily, Disp: , Rfl:     ondansetron (Zofran) 8 MG tablet, Take 1 tablet by mouth Every 8 (Eight) Hours As Needed for Nausea or Vomiting., Disp: 60 tablet, Rfl: 2    predniSONE (DELTASONE) 5 MG tablet, Take 1 tablet by mouth Daily., Disp: , Rfl:     prochlorperazine (COMPAZINE) 10 MG tablet, Take 1 tablet by mouth Every 4 (Four) Hours As Needed for Nausea or Vomiting., Disp: 60 tablet, Rfl: 2    Objective     Vital Signs   /82 (BP Location: Left arm, Patient Position: Sitting, Cuff Size: Adult)   Pulse 87   Ht 167.6 cm (65.98\")   Wt 109 kg (241 lb 6.4 oz)   SpO2 99%   BMI 38.98 kg/m²      Physical Exam:  General appearance - alert, well appearing, and in no distress and oriented to person, place, and time  Mental status - alert, oriented to person, place, and time, normal mood, behavior, speech, dress, motor activity, and thought processes  Eyes - sclera anicteric  Neck - supple, no significant adenopathy  Chest - no tachypnea, retractions or cyanosis  Heart - normal rate and regular rhythm  Neurological - alert, oriented, normal speech, no focal findings or movement disorder noted  Skin - normal coloration and turgor, no rashes, no suspicious skin lesions noted    Results Review:  Result Review :            Assessment & Plan       Diagnoses and all orders for this visit:    1. Encounter for care related to Port-a-Cath (Primary)  -     Case Request; Standing  -    "  MRSA Screen Culture (Outpatient) - Swab, Nares; Future  -     XR chest 1 vw; Future  -     ECG 12 Lead; Future  -     Case Request    2. Rectal cancer  -     Case Request; Standing  -     MRSA Screen Culture (Outpatient) - Swab, Nares; Future  -     XR chest 1 vw; Future  -     ECG 12 Lead; Future  -     Case Request    Other orders  -     Follow Anesthesia Guidelines / Protocol; Future  -     Follow Anesthesia Guidelines / Protocol; Standing  -     Verify / Perform Chlorhexidine Skin Prep; Standing  -     Verify / Perform Chlorhexidine Skin Prep if Indicated (If Not Already Completed); Standing  -     Obtain Informed Consent; Future  -     Provide NPO Instructions to Patient; Future  -     Chlorhexidine Skin Prep; Future  -     Notify physician (specify); Standing  -     Instructions on coughing, deep breathing, and incentive spirometry.; Standing  -     Oxygen Therapy-; Standing  -     Place Sequential Compression Device; Standing  -     Maintain Sequential Compression Device; Standing         Antwan Stevenson is a 64 y.o. male with a need for port placement for rectal. After a discussion of risks (including bleeding, port infection with need for removal, damage to surrounding structures including the arteries, pneumothorax and possible port malfunction) and benefits, the patient wishes to proceed with single lumen port placement with fluoroscopy. The patient is currently scheduled for this procedure on 5/9/24 at 1000, with arrival at 0800 that morning.     I also discussed with the patient post-operative pain management including multimodal pain control utilizing Tylenol, ibuprofen, and tramadol for breakthrough pain. I will plan to given the patient 5 tabs of 50mg Ultram post-operatively for break through pain.     Follow up:     Class 2 Severe Obesity (BMI >=35 and <=39.9).     Return if symptoms worsen or fail to improve.        Vera Cueva PA-C  04/29/24  10:39 CDT

## 2024-05-01 NOTE — PROGRESS NOTES
[facility] HEMATOLOGY & ONCOLOGY  [address]  [facility] [place] [address] [phone]    Patient Name: Antwan Stevenson  Encounter Date: 05/16/2024  YOB: 1960  Patient Number: [id number]      REASON FOR FOLLOW-UP: Antwan Stevenson is a pleasant 63 y.o.   male who is seen on follow-up for adenocarcinoma the rectum.  He is seen with spouse, Ronald. History is obtained from patient.  Patient is unreliable historian.        DIAGNOSTIC ABNORMALITIES:   He presented with hematochezia.  Patient seen by Jose Guadalupe Diez, DESTIN 3/20/2024.  He reports frequent rectal bleeding.  Patient scheduled for colonoscopy.  CT abdomen pelvis 4/11/2024.Bilateral nephrolithiasis without obstructive uropathy.   Cholelithiasis.   Left pelvic spigelian hernia containing a portion of sigmoid colon.  No bowel obstruction or inflammation. Fatty supraumbilical and bilateral inguinal hernias present as well.  Evidence of prior umbilical hernia repair.  Bibasilar pulmonary micronodules measuring up to 0.4 cm.  Chest CT follow-up in 12 months recommended unless shorter interval follow-up is warranted by history.   Colonoscopy 4/17/2024.  Anal mass. The colon was normal from the rectum to the mid transverse, as I had to use an endoscope due to the severe anal stenosis.  No specimens collected.  The digital rectal exam revealed a firm, hard and obstructing anal mass. The mass was circumferential. Findings: The entire examined colon appeared normal  Pathology report 4/18/2024. 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  Phone note 4/18/2024.  Patient referred to oncology and colorectal surgery at [place].        PREVIOUS INTERVENTIONS: None.       Oncology/Hematology History   Rectal cancer   4/24/2024 Initial Diagnosis    Rectal cancer     5/21/2024 -  Chemotherapy    OP COLON [id number] OXALIplatin / Leucovorin / Fluorouracil          PAST MEDICAL HISTORY:  ALLERGIES:  No Known Allergies  CURRENT MEDICATIONS:  Outpatient Encounter Medications as of 5/16/2024   Medication Sig Dispense Refill    acetaminophen (Tylenol) 325 MG tablet Take 3 tablets by mouth Every 8 (Eight) Hours. Take every 8 hours for 3 days then take prn as needed.      albuterol sulfate  (90 Base) MCG/ACT inhaler Inhale 2 puffs Every 6 (Six) Hours As Needed.      HYDROcodone-acetaminophen (NORCO) 5-325 MG per tablet Take 1 tablet by mouth Every 6 (Six) Hours As Needed for Mild Pain or Moderate Pain. (Patient taking differently: Take 0.5 tablets by mouth At Night As Needed for Mild Pain or Moderate Pain (rectal pain from cancer).) 100 tablet 0    ibuprofen (ADVIL,MOTRIN) 200 MG tablet Take 2 tablets by mouth Every 6 (Six) Hours As Needed for Mild Pain.      ibuprofen (Motrin IB) 200 MG tablet Take 3 tablets by mouth Every 8 (Eight) Hours. Take every 8 hours for three days then take as needed.      loratadine (CLARITIN) 10 MG tablet Take 1 tablet by mouth Daily.      multivitamin (THERAGRAN) tablet tablet Take 1 tablet by mouth Daily.      ondansetron (Zofran) 4 MG tablet Take 1 tablet by mouth Every 8 (Eight) Hours As Needed for Nausea or Vomiting. 15 tablet 0    ondansetron (Zofran) 8 MG tablet Take 1 tablet by mouth Every 8 (Eight) Hours As Needed for Nausea or Vomiting. 60 tablet 2    predniSONE (DELTASONE) 5 MG tablet Take 1 tablet by mouth Daily.      prochlorperazine (COMPAZINE) 10 MG tablet Take 1 tablet by mouth Every 4 (Four) Hours As Needed for Nausea or Vomiting. 60 tablet 2    temazepam (RESTORIL) 7.5 MG capsule Take 1 capsule by mouth At Night As Needed for Sleep. HAS NOT STARTED THIS Rx      traMADol (Ultram) 50 MG tablet Take 1 tablet by mouth Every 8 (Eight) Hours As Needed for Severe Pain for up to 3 days. 5 tablet 0     Facility-Administered Encounter Medications as of 5/16/2024   Medication Dose Route Frequency Provider Last Rate Last Admin     [COMPLETED] ceFAZolin 2000 mg IVPB in 100 mL NS (MBP)  2,000 mg Intravenous Once Vera Cueva PA-C   2 g at 05/16/24 1007    [DISCONTINUED] atropine sulfate injection 0.5 mg  0.5 mg Intravenous Once PRN Tennille Alvarez MD        [DISCONTINUED] droperidol (INAPSINE) injection 0.625 mg  0.625 mg Intravenous Once PRN Tennille Alvarez MD        [DISCONTINUED] droperidol (INAPSINE) injection 0.625 mg  0.625 mg Intramuscular Once PRN Tennille Alvarez MD        [DISCONTINUED] fentaNYL citrate (PF) (SUBLIMAZE) injection 50 mcg  50 mcg Intravenous Q10 Min PRN Tennille Alvarez MD        [DISCONTINUED] flumazenil (ROMAZICON) injection 0.2 mg  0.2 mg Intravenous PRN Tennille Alvarez MD        [DISCONTINUED] heparin injection    PRN Rosa Mccall MD   500 Units at 05/16/24 1011    [DISCONTINUED] HYDROcodone-acetaminophen (NORCO)  MG per tablet 1 tablet  1 tablet Oral Q4H PRN Tennille Alvarez MD        [DISCONTINUED] HYDROcodone-acetaminophen (NORCO) 5-325 MG per tablet 1 tablet  1 tablet Oral Q4H PRN Tennille Alvarez MD   1 tablet at 05/16/24 1057    [DISCONTINUED] ibuprofen (ADVIL,MOTRIN) tablet 600 mg  600 mg Oral Q6H PRN Tennille Alvarez MD        [DISCONTINUED] labetalol (NORMODYNE,TRANDATE) injection 5 mg  5 mg Intravenous Q5 Min PRN Tennille Alvarez MD        [DISCONTINUED] lactated ringers infusion 1,000 mL  1,000 mL Intravenous Continuous Rosa Mccall MD 25 mL/hr at 05/16/24 1001 Restarted at 05/16/24 1029    [DISCONTINUED] lactated ringers infusion  100 mL/hr Intravenous Continuous Tennille Alvarez MD        [DISCONTINUED] lidocaine 1% - EPINEPHrine 1:991324 (XYLOCAINE W/EPI) 1 %-1:867729 injection    PRN Rosa Mccall MD   20 mL at 05/16/24 1011    [DISCONTINUED] lidocaine PF 1% (XYLOCAINE) injection 0.5 mL  0.5 mL Intradermal Once PRN Rosa Mccall MD        [DISCONTINUED] lidocaine PF 2% (XYLOCAINE)  injection   Intravenous PRN Cristian Hoang CRNA   100 mg at 05/16/24 1005    [DISCONTINUED] midazolam (VERSED) injection 2 mg  2 mg Intravenous Q10 Min PRN Tennille Alvarez MD   2 mg at 05/16/24 0947    [DISCONTINUED] naloxone (NARCAN) injection 0.4 mg  0.4 mg Intravenous PRN Tennille Alvarez MD        [DISCONTINUED] ondansetron (ZOFRAN) injection 4 mg  4 mg Intravenous Once PRN Tennille Alvarez MD        [DISCONTINUED] Propofol (DIPRIVAN) injection   Intravenous PRN Cristian Hoang CRNA   Stopped at 05/16/24 1027    [DISCONTINUED] sodium chloride 0.9 % flush 3 mL  3 mL Intravenous PRN Rosa Mccall MD        [DISCONTINUED] sodium chloride 0.9 % flush 3 mL  3 mL Intravenous Q12H Tennille Alvarez MD        [DISCONTINUED] sodium chloride 0.9 % flush 3-10 mL  3-10 mL Intravenous PRN Tennille Alvarez MD        [DISCONTINUED] sodium chloride 0.9 % infusion 40 mL  40 mL Intravenous PRN Tennille Alvarez MD        [DISCONTINUED] sodium chloride 0.9 % solution    PRN Rosa Mccall MD   250 mL at 05/16/24 1011     ADULT ILLNESSES:  Patient Active Problem List   Diagnosis Code    Rectal bleed K62.5    Rectal cancer C20    Encounter for care related to Port-a-Cath Z45.2     SURGERIES:  Past Surgical History:   Procedure Laterality Date    COLONOSCOPY N/A 4/17/2024    Procedure: COLONOSCOPY WITH ANESTHESIA;  Surgeon: Salty Arias DO;  Location: [facility] ENDOSCOPY;  Service: Gastroenterology;  Laterality: N/A;  preop; rectal bleed   postop questionable anal mass   PCP Duy Lomeli    HEMORROIDECTOMY      HERNIA REPAIR       HEALTH MAINTENANCE ITEMS:  Health Maintenance Due   Topic Date Due    BMI FOLLOWUP  Never done    TDAP/TD VACCINES (1 - Tdap) Never done    ZOSTER VACCINE (1 of 2) Never done    RSV Vaccine - Adults (1 - 1-dose 60+ series) Never done    Pneumococcal Vaccine 65+ (1 of 1 - PCV) Never done    COVID-19 Vaccine (4 - 2023-24 season) 09/01/2023     "HEPATITIS C SCREENING  Never done    ANNUAL WELLNESS VISIT  Never done       <no information>  Last Completed Colonoscopy            Discontinued - COLORECTAL CANCER SCREENING  Discontinued        Frequency changed to Never automatically (Topic No Longer Applies)    04/17/2024  Colonoscopy    04/17/2024  Surgical Procedure: COLONOSCOPY                  Immunization History   Administered Date(s) Administered    COVID-19 (MODERNA) 1st,2nd,3rd Dose Monovalent 04/22/2021, 05/24/2021    COVID-19 (MODERNA) Monovalent Original Booster 01/04/2022     Last Completed Mammogram       This patient has no relevant Health Maintenance data.              FAMILY HISTORY:  Family History   Problem Relation Age of Onset    Colon cancer Neg Hx     Colon polyps Neg Hx     Esophageal cancer Neg Hx      SOCIAL HISTORY:  Social History     Socioeconomic History    Marital status:    Tobacco Use    Smoking status: Never    Smokeless tobacco: Never   Vaping Use    Vaping status: Never Used   Substance and Sexual Activity    Alcohol use: Never    Drug use: Never    Sexual activity: Defer       REVIEW OF SYSTEMS:    Review of Systems   Constitutional:  Negative for fatigue, fever and unexpected weight change.        \"I feel good.\"   HENT:  Negative for congestion and mouth sores.    Eyes:  Negative for redness and visual disturbance.   Respiratory:  Negative for shortness of breath and wheezing.    Cardiovascular:  Negative for chest pain and leg swelling.   Gastrointestinal:  Positive for anal bleeding. Negative for nausea and vomiting.   Endocrine: Negative for cold intolerance and heat intolerance.   Genitourinary:  Negative for difficulty urinating and dysuria.   Musculoskeletal:  Negative for gait problem and myalgias.   Skin:  Negative for pallor.   Allergic/Immunologic: Negative for food allergies.   Neurological:  Negative for dizziness and speech difficulty.   Hematological:  Negative for adenopathy. Does not bruise/bleed " "easily.   Psychiatric/Behavioral:  Negative for agitation, confusion and hallucinations.        VITAL SIGNS: /76   Pulse 90   Temp 97.4 °F (36.3 °C)   Resp 18   Ht 167.7 cm (66.02\")   Wt 109 kg (240 lb)   SpO2 98%   BMI 38.71 kg/m²   Pain Score    05/16/24 1314   PainSc:   2       PHYSICAL EXAMINATION:     Physical Exam  Vitals reviewed.   Constitutional:       General: He is not in acute distress.  HENT:      Head: Normocephalic and atraumatic.   Eyes:      General: No scleral icterus.  Cardiovascular:      Rate and Rhythm: Normal rate.   Pulmonary:      Effort: No respiratory distress.      Breath sounds: No wheezing.   Abdominal:      General: Bowel sounds are normal.      Palpations: Abdomen is soft.   Musculoskeletal:         General: No swelling.      Cervical back: Neck supple.   Skin:     Coloration: Skin is not pale.   Neurological:      Mental Status: He is alert and oriented to person, place, and time.   Psychiatric:         Mood and Affect: Mood normal.         Behavior: Behavior normal.         Thought Content: Thought content normal.         Judgment: Judgment normal.         LABS    Lab Results - Last 18 Months   Lab Units 04/24/24  1406 02/29/24  0859   HEMOGLOBIN g/dL 13.3 14.2   HEMATOCRIT % 42.7 46.0   MCV fL 83.1 84.4   WBC 10*3/mm3 4.86 6.2   RDW % 13.2 13.8   MPV fL 9.8 10.1   PLATELETS 10*3/mm3 239 298   NEUTROS ABS 10*3/mm3 2.46 3.5   LYMPHS ABS K/uL  --  1.6   MONOS ABS K/uL  --  1.00*   EOS ABS K/uL  --  0.06   BASOS ABS 10*3/mm3 0.10 0.10   IMMATURE GRANS (ABS) K/uL  --  0.0   NEUTROPHIL % % 47.4  --    MONOCYTES % % 12.4*  --    BASOPHIL % % 2.1*  --    ATYP LYMPH % % 20.6*  --        Lab Results - Last 18 Months   Lab Units 04/24/24  1406   GLUCOSE mg/dL 98   SODIUM mmol/L 138   POTASSIUM mmol/L 4.4   CO2 mmol/L 25.0   CHLORIDE mmol/L 103   ANION GAP mmol/L 10.0   CREATININE mg/dL 0.90   BUN mg/dL 10   BUN / CREAT RATIO  11.1   CALCIUM mg/dL 9.4   ALK PHOS U/L 89   TOTAL " PROTEIN g/dL 7.9   ALT (SGPT) U/L 16   AST (SGOT) U/L 21   BILIRUBIN mg/dL 0.5   ALBUMIN g/dL 4.2   GLOBULIN gm/dL 3.7       Lab Results - Last 18 Months   Lab Units 04/24/24  1406   CEA ng/mL 2.10       Lab Results - Last 18 Months   Lab Units 02/29/24  0859   TSH uIU/mL 2.460       Antwan Stevenson reports a pain score of 2.  Given his pain assessment as noted, treatment options were discussed and the following options were decided upon as a follow-up plan to address the patient's pain: continuation of current treatment plan for pain.      ASSESSMENT:  Adenocarcinoma the rectum. KIKE on 4/28/2024.  AJCC stage:IIIb (cT3, cN1b, cM0)  Treatment status: Pending. Pending neoadjuvant FOLFOX followed by CIV 5FU with radiation.   2.  Performance status of 0.  3.  Bibasilar pulmonary micronodules measuring up to 0.4 cm on 4/11/2024.  4.  ?Interstitial granulomatous dermatitis with arthritis. On prednisone 5 mg daily.             PLAN:   Re: CT chest report 5/3/2024. No definite evidence of metastatic disease in the chest.  Indeterminate 3 mm and 5 mm right lower lobe pulmonary nodules. Recommend a follow-up chest CT in 3 to 6 months to evaluate for stability. Hepatic steatosis. Cholelithiasis. Bilateral nonobstructing renal  calculi.  2.   Re: Pelvic MRI report on 4/25/2024. 4.5 cm circular solid lower rectum/anal canal neoplasm without evidence of mucinous features. There is more inferior extent involving the anal canal at the 12:00/12:30 position and 6:00/6:30 position with involvement of the most inferior aspect of the right external anal sphincter. At the level of the anorectal junction there is also posterior extramural extent of tumor into the intersphincteric space abutting and likely involving the external anal sphincter. Two indeterminate right mesorectal lymph nodes.  3.   Re:  KIKE on 4/28/2024.  4.   Re: Note from Dr. Kaylie Stewart 5/1/2024. Plan for total neoadjuvant chemo. .  5.    Re: Note from Vera Cueva PA-C on 4/29/2024.  Plan for port placement.  6.   Re: Unremarkable heme status, unremarkable CMP and CEA at 2.1 on 4/24/2024.  7. Plan for total neoadjuvant FOLFOX to be followed by neoadjuvant CIV 5-FU with radiation.   8. Port placed 5/16/2024.   9.  Neoadjuvant FOLFOX for 12 cycles:  Aware of potential nausea, vomiting, diarrhea, cold intolerance, myelosuppression, anaphylaxis, alopecia or neuropathy.  10.  Schedule treatment C1 D1 to 3 on 5/21/2024 and C2 D1 to D3 on 6/4/2024.  To be administered every 2 weeks: Oxaliplatin 85 mg/m2 IVPB.  Leucovorin 400 mg/m2.  5- mg/ m2  IVP.  Begin 5-FU 2,400 mg/m2 IVPB over 46 hours.    11.    Premedicate with:  Aloxi 0.25 mg IVP.  Emend 150 mg IV.  Decadron 12 mg IVPB over 20-30 min.  12.   Weekly CBC with differential, magnesium, and CMP with chemo.  13.  eRx Zofran 8 mg p.o. every 8 hours as needed for nausea and vomiting #60, 2 refills.  14.  eRx Compazine 10 mg p.o. every 4 hours as needed for nausea and vomiting #60, 2 refills.  15.  Continue care per primary care physician and other specialists.  16.  Plan of care discussed with patient.  Understanding expressed.  Patient agreeable to proceed.  17.  Chemo education 5/20/2024.  18.  Return to office 6/18/2024.  19.  Advance Care Planning  ACP discussion was declined by the patient. Patient does not have an advance directive, information provided. Repeat MRI pelvis and CT chest after neoadjuvant chemo.             I have reviewed the assessment and plan and verified the accuracy of it. No changes to assessment and plan since the information was documented. Raul Mayberry MD 05/16/24         I spent 41 total minutes, face-to-face, caring for Antwan today. Greater than 50% of this time involved counseling and/or coordination of care as documented within this note.            (Salty Arias DO)  (Kaylie Stewart MD)  (Bruno Sanford MD)  Duy Lomeli MD

## 2024-05-02 ENCOUNTER — HOSPITAL ENCOUNTER (OUTPATIENT)
Dept: GENERAL RADIOLOGY | Facility: HOSPITAL | Age: 64
Discharge: HOME | End: 2024-05-02
Payer: MEDICARE

## 2024-05-02 ENCOUNTER — PRE-ADMISSION TESTING (OUTPATIENT)
Dept: PREADMISSION TESTING | Facility: HOSPITAL | Age: 64
End: 2024-05-02
Payer: MEDICARE

## 2024-05-02 VITALS
OXYGEN SATURATION: 96 % | WEIGHT: 244.49 LBS | RESPIRATION RATE: 18 BRPM | SYSTOLIC BLOOD PRESSURE: 159 MMHG | HEIGHT: 66 IN | BODY MASS INDEX: 39.29 KG/M2 | HEART RATE: 93 BPM | DIASTOLIC BLOOD PRESSURE: 67 MMHG

## 2024-05-02 DIAGNOSIS — C20 RECTAL CANCER: ICD-10-CM

## 2024-05-02 DIAGNOSIS — Z45.2 ENCOUNTER FOR CARE RELATED TO PORT-A-CATH: ICD-10-CM

## 2024-05-02 PROCEDURE — 87081 CULTURE SCREEN ONLY: CPT

## 2024-05-02 PROCEDURE — 71045 X-RAY EXAM CHEST 1 VIEW: CPT

## 2024-05-02 PROCEDURE — 93005 ELECTROCARDIOGRAM TRACING: CPT

## 2024-05-02 RX ORDER — IBUPROFEN 200 MG
400 TABLET ORAL EVERY 6 HOURS PRN
COMMUNITY

## 2024-05-02 NOTE — DISCHARGE INSTRUCTIONS

## 2024-05-03 ENCOUNTER — HOSPITAL ENCOUNTER (OUTPATIENT)
Dept: CT IMAGING | Facility: HOSPITAL | Age: 64
Discharge: HOME | End: 2024-05-03
Payer: MEDICARE

## 2024-05-03 DIAGNOSIS — C20 RECTAL CANCER: ICD-10-CM

## 2024-05-03 LAB
MRSA SPEC QL CULT: ABNORMAL
QT INTERVAL: 336 MS
QTC INTERVAL: 402 MS

## 2024-05-03 PROCEDURE — 25510000001 IOPAMIDOL 61 % SOLUTION: Performed by: INTERNAL MEDICINE

## 2024-05-03 PROCEDURE — 71260 CT THORAX DX C+: CPT

## 2024-05-03 RX ADMIN — IOPAMIDOL 100 ML: 612 INJECTION, SOLUTION INTRAVENOUS at 14:42

## 2024-05-09 ENCOUNTER — TELEPHONE (OUTPATIENT)
Dept: ONCOLOGY | Facility: CLINIC | Age: 64
End: 2024-05-09

## 2024-05-09 NOTE — TELEPHONE ENCOUNTER
PATIENT CALLED BACK WAS CALLING THE  AND PATIENT DISCONNECTED.    CALL HIM BACK SEE THE FIRST MESSAGE. [phone]

## 2024-05-09 NOTE — TELEPHONE ENCOUNTER
Hub staff attempted to follow warm transfer process and was unsuccessful     Caller: Antwan Stevenson    Relationship to patient: Self    Best call back number: [phone]    Patient is needing: TO RESCHEDULE FOLLOW UP 1 APPOINTMENT WITH DR MARTINEZ 05/16 DUE TO IS HAVING PORT PLACEMENT THE SAME DAY     HAD REQUESTED TO SEE IF CAN GET IN TOMORROW 05/10 FOR THE APPOINTMENT     OR ANYTHING PRIOR TO 05/16 PREFER MORNING APPT TIME     ADDITIONAL NOTE: I SPOKE WITH ZAYRA SHE ASKED TO SEND A TE , WILL HAVE TO CHECK WITH DR MARTINEZ AND NURSE TO GET PERMISSION TO SCHEDULE FOR TOMORROW

## 2024-05-16 ENCOUNTER — HOSPITAL ENCOUNTER (OUTPATIENT)
Facility: HOSPITAL | Age: 64
Setting detail: HOSPITAL OUTPATIENT SURGERY
Discharge: HOME | End: 2024-05-16
Attending: STUDENT IN AN ORGANIZED HEALTH CARE EDUCATION/TRAINING PROGRAM | Admitting: STUDENT IN AN ORGANIZED HEALTH CARE EDUCATION/TRAINING PROGRAM
Payer: MEDICARE

## 2024-05-16 ENCOUNTER — APPOINTMENT (OUTPATIENT)
Dept: GENERAL RADIOLOGY | Facility: HOSPITAL | Age: 64
End: 2024-05-16
Payer: MEDICARE

## 2024-05-16 ENCOUNTER — ANESTHESIA EVENT (OUTPATIENT)
Dept: PERIOP | Facility: HOSPITAL | Age: 64
End: 2024-05-16
Payer: MEDICARE

## 2024-05-16 ENCOUNTER — OFFICE VISIT (OUTPATIENT)
Dept: ONCOLOGY | Facility: CLINIC | Age: 64
End: 2024-05-16
Payer: MEDICARE

## 2024-05-16 ENCOUNTER — ANESTHESIA (OUTPATIENT)
Dept: PERIOP | Facility: HOSPITAL | Age: 64
End: 2024-05-16
Payer: MEDICARE

## 2024-05-16 VITALS
TEMPERATURE: 97.4 F | HEIGHT: 66 IN | BODY MASS INDEX: 38.57 KG/M2 | DIASTOLIC BLOOD PRESSURE: 76 MMHG | OXYGEN SATURATION: 98 % | HEART RATE: 90 BPM | WEIGHT: 240 LBS | RESPIRATION RATE: 18 BRPM | SYSTOLIC BLOOD PRESSURE: 142 MMHG

## 2024-05-16 VITALS
HEART RATE: 72 BPM | DIASTOLIC BLOOD PRESSURE: 73 MMHG | SYSTOLIC BLOOD PRESSURE: 144 MMHG | RESPIRATION RATE: 16 BRPM | TEMPERATURE: 96.8 F | OXYGEN SATURATION: 98 %

## 2024-05-16 DIAGNOSIS — Z45.2 ENCOUNTER FOR CARE RELATED TO PORT-A-CATH: ICD-10-CM

## 2024-05-16 DIAGNOSIS — K62.5 RECTAL BLEED: Primary | ICD-10-CM

## 2024-05-16 DIAGNOSIS — C20 RECTAL CANCER: Primary | ICD-10-CM

## 2024-05-16 PROCEDURE — 76000 FLUOROSCOPY <1 HR PHYS/QHP: CPT

## 2024-05-16 PROCEDURE — 25010000002 CEFAZOLIN PER 500 MG

## 2024-05-16 PROCEDURE — 25810000003 SODIUM CHLORIDE PER 500 ML: Performed by: STUDENT IN AN ORGANIZED HEALTH CARE EDUCATION/TRAINING PROGRAM

## 2024-05-16 PROCEDURE — 25010000002 HEPARIN LOCK FLUSH PER 10 UNITS: Performed by: STUDENT IN AN ORGANIZED HEALTH CARE EDUCATION/TRAINING PROGRAM

## 2024-05-16 PROCEDURE — 25810000003 LACTATED RINGERS PER 1000 ML: Performed by: STUDENT IN AN ORGANIZED HEALTH CARE EDUCATION/TRAINING PROGRAM

## 2024-05-16 PROCEDURE — 25010000002 MIDAZOLAM PER 1 MG: Performed by: ANESTHESIOLOGY

## 2024-05-16 PROCEDURE — 25010000002 PROPOFOL 1000 MG/100ML EMULSION: Performed by: NURSE ANESTHETIST, CERTIFIED REGISTERED

## 2024-05-16 PROCEDURE — 36561 INSERT TUNNELED CV CATH: CPT | Performed by: STUDENT IN AN ORGANIZED HEALTH CARE EDUCATION/TRAINING PROGRAM

## 2024-05-16 PROCEDURE — C1788 PORT, INDWELLING, IMP: HCPCS | Performed by: STUDENT IN AN ORGANIZED HEALTH CARE EDUCATION/TRAINING PROGRAM

## 2024-05-16 PROCEDURE — 77001 FLUOROGUIDE FOR VEIN DEVICE: CPT | Performed by: STUDENT IN AN ORGANIZED HEALTH CARE EDUCATION/TRAINING PROGRAM

## 2024-05-16 DEVICE — PRT INTRO VASC/INTERV VORTEX FILL/HL DETACH/POLYURET/CATH 8F: Type: IMPLANTABLE DEVICE | Site: CLAVICLE | Status: FUNCTIONAL

## 2024-05-16 RX ORDER — FAMOTIDINE 10 MG/ML
20 INJECTION, SOLUTION INTRAVENOUS AS NEEDED
OUTPATIENT
Start: 2024-05-21

## 2024-05-16 RX ORDER — HYDROCODONE BITARTRATE AND ACETAMINOPHEN 5; 325 MG/1; MG/1
1 TABLET ORAL EVERY 4 HOURS PRN
Status: DISCONTINUED | OUTPATIENT
Start: 2024-05-16 | End: 2024-05-16 | Stop reason: HOSPADM

## 2024-05-16 RX ORDER — SODIUM CHLORIDE 0.9 % (FLUSH) 0.9 %
3 SYRINGE (ML) INJECTION AS NEEDED
Status: DISCONTINUED | OUTPATIENT
Start: 2024-05-16 | End: 2024-05-16 | Stop reason: HOSPADM

## 2024-05-16 RX ORDER — PALONOSETRON 0.05 MG/ML
0.25 INJECTION, SOLUTION INTRAVENOUS ONCE
OUTPATIENT
Start: 2024-05-21

## 2024-05-16 RX ORDER — LABETALOL HYDROCHLORIDE 5 MG/ML
5 INJECTION, SOLUTION INTRAVENOUS
Status: DISCONTINUED | OUTPATIENT
Start: 2024-05-16 | End: 2024-05-16 | Stop reason: HOSPADM

## 2024-05-16 RX ORDER — LIDOCAINE HYDROCHLORIDE 10 MG/ML
0.5 INJECTION, SOLUTION EPIDURAL; INFILTRATION; INTRACAUDAL; PERINEURAL ONCE AS NEEDED
Status: DISCONTINUED | OUTPATIENT
Start: 2024-05-16 | End: 2024-05-16 | Stop reason: HOSPADM

## 2024-05-16 RX ORDER — TRAMADOL HYDROCHLORIDE 50 MG/1
50 TABLET ORAL EVERY 8 HOURS PRN
Qty: 5 TABLET | Refills: 0 | Status: SHIPPED | OUTPATIENT
Start: 2024-05-16 | End: 2024-05-19

## 2024-05-16 RX ORDER — NALOXONE HCL 0.4 MG/ML
0.4 VIAL (ML) INJECTION AS NEEDED
Status: DISCONTINUED | OUTPATIENT
Start: 2024-05-16 | End: 2024-05-16 | Stop reason: HOSPADM

## 2024-05-16 RX ORDER — DIPHENHYDRAMINE HYDROCHLORIDE 50 MG/ML
50 INJECTION INTRAMUSCULAR; INTRAVENOUS AS NEEDED
OUTPATIENT
Start: 2024-05-21

## 2024-05-16 RX ORDER — IBUPROFEN 600 MG/1
600 TABLET ORAL EVERY 6 HOURS PRN
Status: DISCONTINUED | OUTPATIENT
Start: 2024-05-16 | End: 2024-05-16 | Stop reason: HOSPADM

## 2024-05-16 RX ORDER — HEPARIN SODIUM (PORCINE) LOCK FLUSH IV SOLN 100 UNIT/ML 100 UNIT/ML
SOLUTION INTRAVENOUS AS NEEDED
Status: DISCONTINUED | OUTPATIENT
Start: 2024-05-16 | End: 2024-05-16 | Stop reason: HOSPADM

## 2024-05-16 RX ORDER — LIDOCAINE HYDROCHLORIDE AND EPINEPHRINE 10; 10 MG/ML; UG/ML
INJECTION, SOLUTION INFILTRATION; PERINEURAL AS NEEDED
Status: DISCONTINUED | OUTPATIENT
Start: 2024-05-16 | End: 2024-05-16 | Stop reason: HOSPADM

## 2024-05-16 RX ORDER — HYDROCODONE BITARTRATE AND ACETAMINOPHEN 10; 325 MG/1; MG/1
1 TABLET ORAL EVERY 4 HOURS PRN
Status: DISCONTINUED | OUTPATIENT
Start: 2024-05-16 | End: 2024-05-16 | Stop reason: HOSPADM

## 2024-05-16 RX ORDER — FLUMAZENIL 0.1 MG/ML
0.2 INJECTION INTRAVENOUS AS NEEDED
Status: DISCONTINUED | OUTPATIENT
Start: 2024-05-16 | End: 2024-05-16 | Stop reason: HOSPADM

## 2024-05-16 RX ORDER — ACETAMINOPHEN 325 MG/1
975 TABLET ORAL EVERY 8 HOURS
Start: 2024-05-16 | End: 2025-05-16

## 2024-05-16 RX ORDER — MIDAZOLAM HYDROCHLORIDE 1 MG/ML
2 INJECTION INTRAMUSCULAR; INTRAVENOUS
Status: DISCONTINUED | OUTPATIENT
Start: 2024-05-16 | End: 2024-05-16 | Stop reason: HOSPADM

## 2024-05-16 RX ORDER — ONDANSETRON 2 MG/ML
4 INJECTION INTRAMUSCULAR; INTRAVENOUS ONCE AS NEEDED
Status: DISCONTINUED | OUTPATIENT
Start: 2024-05-16 | End: 2024-05-16 | Stop reason: HOSPADM

## 2024-05-16 RX ORDER — TEMAZEPAM 7.5 MG/1
7.5 CAPSULE ORAL NIGHTLY PRN
COMMUNITY

## 2024-05-16 RX ORDER — SODIUM CHLORIDE 9 MG/ML
INJECTION, SOLUTION INTRAVENOUS AS NEEDED
Status: DISCONTINUED | OUTPATIENT
Start: 2024-05-16 | End: 2024-05-16 | Stop reason: HOSPADM

## 2024-05-16 RX ORDER — IBUPROFEN 200 MG
600 TABLET ORAL EVERY 8 HOURS
Start: 2024-05-16 | End: 2025-05-16

## 2024-05-16 RX ORDER — PROPOFOL 10 MG/ML
INJECTION, EMULSION INTRAVENOUS AS NEEDED
Status: DISCONTINUED | OUTPATIENT
Start: 2024-05-16 | End: 2024-05-16 | Stop reason: SURG

## 2024-05-16 RX ORDER — DROPERIDOL 2.5 MG/ML
0.62 INJECTION, SOLUTION INTRAMUSCULAR; INTRAVENOUS ONCE AS NEEDED
Status: DISCONTINUED | OUTPATIENT
Start: 2024-05-16 | End: 2024-05-16 | Stop reason: HOSPADM

## 2024-05-16 RX ORDER — SODIUM CHLORIDE 9 MG/ML
40 INJECTION, SOLUTION INTRAVENOUS AS NEEDED
Status: DISCONTINUED | OUTPATIENT
Start: 2024-05-16 | End: 2024-05-16 | Stop reason: HOSPADM

## 2024-05-16 RX ORDER — SODIUM CHLORIDE, SODIUM LACTATE, POTASSIUM CHLORIDE, CALCIUM CHLORIDE 600; 310; 30; 20 MG/100ML; MG/100ML; MG/100ML; MG/100ML
1000 INJECTION, SOLUTION INTRAVENOUS CONTINUOUS
Status: DISCONTINUED | OUTPATIENT
Start: 2024-05-16 | End: 2024-05-16 | Stop reason: HOSPADM

## 2024-05-16 RX ORDER — DEXTROSE MONOHYDRATE 50 MG/ML
20 INJECTION, SOLUTION INTRAVENOUS ONCE
OUTPATIENT
Start: 2024-05-21

## 2024-05-16 RX ORDER — FENTANYL CITRATE 50 UG/ML
50 INJECTION, SOLUTION INTRAMUSCULAR; INTRAVENOUS
Status: DISCONTINUED | OUTPATIENT
Start: 2024-05-16 | End: 2024-05-16 | Stop reason: HOSPADM

## 2024-05-16 RX ORDER — SODIUM CHLORIDE 0.9 % (FLUSH) 0.9 %
3 SYRINGE (ML) INJECTION EVERY 12 HOURS SCHEDULED
Status: DISCONTINUED | OUTPATIENT
Start: 2024-05-16 | End: 2024-05-16 | Stop reason: HOSPADM

## 2024-05-16 RX ORDER — SODIUM CHLORIDE, SODIUM LACTATE, POTASSIUM CHLORIDE, CALCIUM CHLORIDE 600; 310; 30; 20 MG/100ML; MG/100ML; MG/100ML; MG/100ML
100 INJECTION, SOLUTION INTRAVENOUS CONTINUOUS
Status: DISCONTINUED | OUTPATIENT
Start: 2024-05-16 | End: 2024-05-16 | Stop reason: HOSPADM

## 2024-05-16 RX ORDER — FLUOROURACIL 50 MG/ML
400 INJECTION, SOLUTION INTRAVENOUS ONCE
OUTPATIENT
Start: 2024-05-21

## 2024-05-16 RX ORDER — LIDOCAINE HYDROCHLORIDE 20 MG/ML
INJECTION, SOLUTION EPIDURAL; INFILTRATION; INTRACAUDAL; PERINEURAL AS NEEDED
Status: DISCONTINUED | OUTPATIENT
Start: 2024-05-16 | End: 2024-05-16 | Stop reason: SURG

## 2024-05-16 RX ORDER — ONDANSETRON 4 MG/1
4 TABLET, FILM COATED ORAL EVERY 8 HOURS PRN
Qty: 15 TABLET | Refills: 0 | Status: SHIPPED | OUTPATIENT
Start: 2024-05-16 | End: 2025-05-16

## 2024-05-16 RX ORDER — SODIUM CHLORIDE 0.9 % (FLUSH) 0.9 %
3-10 SYRINGE (ML) INJECTION AS NEEDED
Status: DISCONTINUED | OUTPATIENT
Start: 2024-05-16 | End: 2024-05-16 | Stop reason: HOSPADM

## 2024-05-16 RX ADMIN — PROPOFOL INJECTABLE EMULSION 50 MG: 10 INJECTION, EMULSION INTRAVENOUS at 10:05

## 2024-05-16 RX ADMIN — MIDAZOLAM HYDROCHLORIDE 2 MG: 1 INJECTION, SOLUTION INTRAMUSCULAR; INTRAVENOUS at 09:47

## 2024-05-16 RX ADMIN — CEFAZOLIN 2 G: 2 INJECTION, POWDER, FOR SOLUTION INTRAMUSCULAR; INTRAVENOUS at 10:07

## 2024-05-16 RX ADMIN — PROPOFOL INJECTABLE EMULSION 150 MCG/KG/MIN: 10 INJECTION, EMULSION INTRAVENOUS at 10:06

## 2024-05-16 RX ADMIN — SODIUM CHLORIDE, POTASSIUM CHLORIDE, SODIUM LACTATE AND CALCIUM CHLORIDE 1000 ML: 600; 310; 30; 20 INJECTION, SOLUTION INTRAVENOUS at 08:58

## 2024-05-16 RX ADMIN — HYDROCODONE BITARTRATE AND ACETAMINOPHEN 1 TABLET: 5; 325 TABLET ORAL at 10:57

## 2024-05-16 RX ADMIN — LIDOCAINE HYDROCHLORIDE 100 MG: 20 INJECTION, SOLUTION EPIDURAL; INFILTRATION; INTRACAUDAL; PERINEURAL at 10:05

## 2024-05-16 NOTE — OP NOTE
Port-A-Cath Placement with Fluoroscopy Operative Report:     Patient: Antwan Stevenson  MRN: [id number]    YOB: 1960  Age: 64 y.o.  Sex: male  Unit:  PAD OR Room/Bed: PAD OR/MAIN OR Location: [facility]      Admitting Physician: BALBIR MCCALL    Primary Care Physician: Duy Lomeli MD             INDICATIONS: This is a 64 y.o. male who presents with rectal cancer indication for port placement.     DATE OF OPERATION: 5/16/2024     Surgeons and Role:     * Balbir Mccall MD - Primary    ANESTHESIA: Monitored Anesthesia Care     PREOPERATIVE DIAGNOSIS: Rectal cancer [C20]  Encounter for care related to Port-a-Cath [Z45.2]    POSTOPERATIVE DIAGNOSIS: Same    PROCEDURES PERFORMED:  Port-A-Cath placement in the right cephalic vein     PROCEDURE DETAILS:     After patient was placed on the table in a supine position the bilateral chest and neck were prepped with ChloraPrep and draped in the usual fashion.  Preoperative antibiotics were given.  A timeout was performed.    On the right, the skin overlying the deltopectoral groove was infiltrated with 1% lidocaine with epinephrine. An incision was made, and I dissected down to the deltopectoral groove with a combination of cautery and sharp dissection. The cephalic vein was identified. 2-0 silk ties were placed proximally and distally. The distal tie was tied down.  An 11 blade was used to create a venotomy. The yellow pic was used to open the vein and the catheter was inserted. Under live fluoroscopy, the catheter was noted to be in the SVC. The proximal tie was tied down, ensuring not to narrow the lumen of the catheter in the vein. A pocket was created in the subcutaneous tissue for the port.  The catheter was connected to the Port-A-Cath which was buried into the subcutaneous pocket and secured with 0-Vicryl sutures.  The pocket was closed with 3-0 Vicryl and then 4-0 Monocryl.  The port was accessed through the skin, blood was easily aspirated,  and it was flushed with a final heparin flush. Skin glue was placed over the incision.  The patient tolerated procedure well.  There were no complications. No x-ray needed in PACU     Findings: Port-a-cath placed in the right cephalic vein   Estimated Blood Loss:  10 mL   Complications: none apparent            Specimens: None            Disposition: PACU - hemodynamically stable.           Condition: stable    Rosa Mccall MD  04/29/2024

## 2024-05-16 NOTE — LETTER
May 16, 2024       No Recipients    Patient: Antwan Stevenson   YOB: 1960   Date of Visit: 5/16/2024     Dear Duy Lomeli MD:       Thank you for referring Antwan Stevenson to me for evaluation. Below are the relevant portions of my assessment and plan of care.    If you have questions, please do not hesitate to call me. I look forward to following Antwan along with you.         Sincerely,        Raul Mayberry MD        CC:   No Recipients    Raul Mayberry MD  05/16/24 1347  Sign when Signing Visit  [facility] HEMATOLOGY & ONCOLOGY  [address]  [place] [address]  [phone]    Patient Name: Antwan Stevenson  Encounter Date: 05/16/2024  YOB: 1960  Patient Number: [id number]      REASON FOR FOLLOW-UP: Antwan Stevenson is a pleasant 63 y.o.   male who is seen on follow-up for adenocarcinoma the rectum.  He is seen with spouse, Ronald. History is obtained from patient.  Patient is unreliable historian.        DIAGNOSTIC ABNORMALITIES:   He presented with hematochezia.  Patient seen by DESTIN Martinez 3/20/2024.  He reports frequent rectal bleeding.  Patient scheduled for colonoscopy.  CT abdomen pelvis 4/11/2024.Bilateral nephrolithiasis without obstructive uropathy.   Cholelithiasis.   Left pelvic spigelian hernia containing a portion of sigmoid colon.  No bowel obstruction or inflammation. Fatty supraumbilical and bilateral inguinal hernias present as well.  Evidence of prior umbilical hernia repair.  Bibasilar pulmonary micronodules measuring up to 0.4 cm.  Chest CT follow-up in 12 months recommended unless shorter interval follow-up is warranted by history.   Colonoscopy 4/17/2024.  Anal mass. The colon was normal from the rectum to the mid transverse, as I had to use an endoscope due to the severe anal stenosis.  No specimens collected.  The digital rectal exam revealed a firm, hard and obstructing anal mass. The  mass was circumferential. Findings: The entire examined colon appeared normal  Pathology report 4/18/2024. 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  Phone note 4/18/2024.  Patient referred to oncology and colorectal surgery at [place].        PREVIOUS INTERVENTIONS: None.       Oncology/Hematology History   Rectal cancer   4/24/2024 Initial Diagnosis    Rectal cancer     5/21/2024 -  Chemotherapy    OP COLON [id number] OXALIplatin / Leucovorin / Fluorouracil         PAST MEDICAL HISTORY:  ALLERGIES:  No Known Allergies  CURRENT MEDICATIONS:  Outpatient Encounter Medications as of 5/16/2024   Medication Sig Dispense Refill   • acetaminophen (Tylenol) 325 MG tablet Take 3 tablets by mouth Every 8 (Eight) Hours. Take every 8 hours for 3 days then take prn as needed.     • albuterol sulfate  (90 Base) MCG/ACT inhaler Inhale 2 puffs Every 6 (Six) Hours As Needed.     • HYDROcodone-acetaminophen (NORCO) 5-325 MG per tablet Take 1 tablet by mouth Every 6 (Six) Hours As Needed for Mild Pain or Moderate Pain. (Patient taking differently: Take 0.5 tablets by mouth At Night As Needed for Mild Pain or Moderate Pain (rectal pain from cancer).) 100 tablet 0   • ibuprofen (ADVIL,MOTRIN) 200 MG tablet Take 2 tablets by mouth Every 6 (Six) Hours As Needed for Mild Pain.     • ibuprofen (Motrin IB) 200 MG tablet Take 3 tablets by mouth Every 8 (Eight) Hours. Take every 8 hours for three days then take as needed.     • loratadine (CLARITIN) 10 MG tablet Take 1 tablet by mouth Daily.     • multivitamin (THERAGRAN) tablet tablet Take 1 tablet by mouth Daily.     • ondansetron (Zofran) 4 MG tablet Take 1 tablet by mouth Every 8 (Eight) Hours As Needed for Nausea or Vomiting. 15 tablet 0   • ondansetron (Zofran) 8 MG tablet Take 1 tablet by mouth Every 8 (Eight) Hours As Needed for Nausea or Vomiting. 60 tablet 2   • predniSONE (DELTASONE) 5 MG tablet Take 1 tablet by mouth Daily.      • prochlorperazine (COMPAZINE) 10 MG tablet Take 1 tablet by mouth Every 4 (Four) Hours As Needed for Nausea or Vomiting. 60 tablet 2   • temazepam (RESTORIL) 7.5 MG capsule Take 1 capsule by mouth At Night As Needed for Sleep. HAS NOT STARTED THIS Rx     • traMADol (Ultram) 50 MG tablet Take 1 tablet by mouth Every 8 (Eight) Hours As Needed for Severe Pain for up to 3 days. 5 tablet 0     Facility-Administered Encounter Medications as of 5/16/2024   Medication Dose Route Frequency Provider Last Rate Last Admin   • [COMPLETED] ceFAZolin 2000 mg IVPB in 100 mL NS (MBP)  2,000 mg Intravenous Once Vera Cueva PA-C   2 g at 05/16/24 1007   • [DISCONTINUED] atropine sulfate injection 0.5 mg  0.5 mg Intravenous Once PRN Tennille Alvarez MD       • [DISCONTINUED] droperidol (INAPSINE) injection 0.625 mg  0.625 mg Intravenous Once PRN Tennille Alvarez MD       • [DISCONTINUED] droperidol (INAPSINE) injection 0.625 mg  0.625 mg Intramuscular Once PRN Tennille Alvarez MD       • [DISCONTINUED] fentaNYL citrate (PF) (SUBLIMAZE) injection 50 mcg  50 mcg Intravenous Q10 Min PRN Tennille Alvarez MD       • [DISCONTINUED] flumazenil (ROMAZICON) injection 0.2 mg  0.2 mg Intravenous PRN Tennille Alvarez MD       • [DISCONTINUED] heparin injection    PRN Rosa Mccall MD   500 Units at 05/16/24 1011   • [DISCONTINUED] HYDROcodone-acetaminophen (NORCO)  MG per tablet 1 tablet  1 tablet Oral Q4H PRN Tennille Alvarez MD       • [DISCONTINUED] HYDROcodone-acetaminophen (NORCO) 5-325 MG per tablet 1 tablet  1 tablet Oral Q4H PRN Tennille Alvarez MD   1 tablet at 05/16/24 1057   • [DISCONTINUED] ibuprofen (ADVIL,MOTRIN) tablet 600 mg  600 mg Oral Q6H PRN Tennille Alvarez MD       • [DISCONTINUED] labetalol (NORMODYNE,TRANDATE) injection 5 mg  5 mg Intravenous Q5 Min PRN Kim Tennille Britni, MD       • [DISCONTINUED] lactated ringers infusion 1,000 mL  1,000 mL  Intravenous Continuous Rosa Mccall MD 25 mL/hr at 05/16/24 1001 Restarted at 05/16/24 1029   • [DISCONTINUED] lactated ringers infusion  100 mL/hr Intravenous Continuous Tennille Alvarez MD       • [DISCONTINUED] lidocaine 1% - EPINEPHrine 1:444009 (XYLOCAINE W/EPI) 1 %-1:696838 injection    PRN Rosa Mccall MD   20 mL at 05/16/24 1011   • [DISCONTINUED] lidocaine PF 1% (XYLOCAINE) injection 0.5 mL  0.5 mL Intradermal Once PRN Rosa Mccall MD       • [DISCONTINUED] lidocaine PF 2% (XYLOCAINE) injection   Intravenous PRN Cristian Hoang CRNA   100 mg at 05/16/24 1005   • [DISCONTINUED] midazolam (VERSED) injection 2 mg  2 mg Intravenous Q10 Min PRN Tennille Alvarez MD   2 mg at 05/16/24 0947   • [DISCONTINUED] naloxone (NARCAN) injection 0.4 mg  0.4 mg Intravenous PRN Tennille Alvarez MD       • [DISCONTINUED] ondansetron (ZOFRAN) injection 4 mg  4 mg Intravenous Once PRN Tennille Alvarez MD       • [DISCONTINUED] Propofol (DIPRIVAN) injection   Intravenous PRN Cristian Hoang CRNA   Stopped at 05/16/24 1027   • [DISCONTINUED] sodium chloride 0.9 % flush 3 mL  3 mL Intravenous PRN Rosa Mccall MD       • [DISCONTINUED] sodium chloride 0.9 % flush 3 mL  3 mL Intravenous Q12H Tennille Alvarez MD       • [DISCONTINUED] sodium chloride 0.9 % flush 3-10 mL  3-10 mL Intravenous PRN Tennille Alvarez MD       • [DISCONTINUED] sodium chloride 0.9 % infusion 40 mL  40 mL Intravenous PRN Tennille Alvarez MD       • [DISCONTINUED] sodium chloride 0.9 % solution    PRN Rosa Mccall MD   250 mL at 05/16/24 1011     ADULT ILLNESSES:  Patient Active Problem List   Diagnosis Code   • Rectal bleed K62.5   • Rectal cancer C20   • Encounter for care related to Port-a-Cath Z45.2     SURGERIES:  Past Surgical History:   Procedure Laterality Date   • COLONOSCOPY N/A 4/17/2024    Procedure: COLONOSCOPY WITH ANESTHESIA;  Surgeon: Salty Arias  "W, DO;  Location: [facility] ENDOSCOPY;  Service: Gastroenterology;  Laterality: N/A;  preop; rectal bleed   postop questionable anal mass   PCP Duy Lomeli   • HEMORROIDECTOMY     • HERNIA REPAIR       HEALTH MAINTENANCE ITEMS:  Health Maintenance Due   Topic Date Due   • BMI FOLLOWUP  Never done   • TDAP/TD VACCINES (1 - Tdap) Never done   • ZOSTER VACCINE (1 of 2) Never done   • RSV Vaccine - Adults (1 - 1-dose 60+ series) Never done   • Pneumococcal Vaccine 65+ (1 of 1 - PCV) Never done   • COVID-19 Vaccine (4 - 2023-24 season) 09/01/2023   • HEPATITIS C SCREENING  Never done   • ANNUAL WELLNESS VISIT  Never done       <no information>  Last Completed Colonoscopy            Discontinued - COLORECTAL CANCER SCREENING  Discontinued        Frequency changed to Never automatically (Topic No Longer Applies)    04/17/2024  Colonoscopy    04/17/2024  Surgical Procedure: COLONOSCOPY                  Immunization History   Administered Date(s) Administered   • COVID-19 (MODERNA) 1st,2nd,3rd Dose Monovalent 04/22/2021, 05/24/2021   • COVID-19 (MODERNA) Monovalent Original Booster 01/04/2022     Last Completed Mammogram       This patient has no relevant Health Maintenance data.              FAMILY HISTORY:  Family History   Problem Relation Age of Onset   • Colon cancer Neg Hx    • Colon polyps Neg Hx    • Esophageal cancer Neg Hx      SOCIAL HISTORY:  Social History     Socioeconomic History   • Marital status:    Tobacco Use   • Smoking status: Never   • Smokeless tobacco: Never   Vaping Use   • Vaping status: Never Used   Substance and Sexual Activity   • Alcohol use: Never   • Drug use: Never   • Sexual activity: Defer       REVIEW OF SYSTEMS:    Review of Systems   Constitutional:  Negative for fatigue, fever and unexpected weight change.        \"I feel good.\"   HENT:  Negative for congestion and mouth sores.    Eyes:  Negative for redness and visual disturbance.   Respiratory:  Negative for shortness of breath " "and wheezing.    Cardiovascular:  Negative for chest pain and leg swelling.   Gastrointestinal:  Positive for anal bleeding. Negative for nausea and vomiting.   Endocrine: Negative for cold intolerance and heat intolerance.   Genitourinary:  Negative for difficulty urinating and dysuria.   Musculoskeletal:  Negative for gait problem and myalgias.   Skin:  Negative for pallor.   Allergic/Immunologic: Negative for food allergies.   Neurological:  Negative for dizziness and speech difficulty.   Hematological:  Negative for adenopathy. Does not bruise/bleed easily.   Psychiatric/Behavioral:  Negative for agitation, confusion and hallucinations.        VITAL SIGNS: /76   Pulse 90   Temp 97.4 °F (36.3 °C)   Resp 18   Ht 167.7 cm (66.02\")   Wt 109 kg (240 lb)   SpO2 98%   BMI 38.71 kg/m²   Pain Score    05/16/24 1314   PainSc:   2       PHYSICAL EXAMINATION:     Physical Exam  Vitals reviewed.   Constitutional:       General: He is not in acute distress.  HENT:      Head: Normocephalic and atraumatic.   Eyes:      General: No scleral icterus.  Cardiovascular:      Rate and Rhythm: Normal rate.   Pulmonary:      Effort: No respiratory distress.      Breath sounds: No wheezing.   Abdominal:      General: Bowel sounds are normal.      Palpations: Abdomen is soft.   Musculoskeletal:         General: No swelling.      Cervical back: Neck supple.   Skin:     Coloration: Skin is not pale.   Neurological:      Mental Status: He is alert and oriented to person, place, and time.   Psychiatric:         Mood and Affect: Mood normal.         Behavior: Behavior normal.         Thought Content: Thought content normal.         Judgment: Judgment normal.         LABS    Lab Results - Last 18 Months   Lab Units 04/24/24  1406 02/29/24  0859   HEMOGLOBIN g/dL 13.3 14.2   HEMATOCRIT % 42.7 46.0   MCV fL 83.1 84.4   WBC 10*3/mm3 4.86 6.2   RDW % 13.2 13.8   MPV fL 9.8 10.1   PLATELETS 10*3/mm3 239 298   NEUTROS ABS 10*3/mm3 2.46 " 3.5   LYMPHS ABS K/uL  --  1.6   MONOS ABS K/uL  --  1.00*   EOS ABS K/uL  --  0.06   BASOS ABS 10*3/mm3 0.10 0.10   IMMATURE GRANS (ABS) K/uL  --  0.0   NEUTROPHIL % % 47.4  --    MONOCYTES % % 12.4*  --    BASOPHIL % % 2.1*  --    ATYP LYMPH % % 20.6*  --        Lab Results - Last 18 Months   Lab Units 04/24/24  1406   GLUCOSE mg/dL 98   SODIUM mmol/L 138   POTASSIUM mmol/L 4.4   CO2 mmol/L 25.0   CHLORIDE mmol/L 103   ANION GAP mmol/L 10.0   CREATININE mg/dL 0.90   BUN mg/dL 10   BUN / CREAT RATIO  11.1   CALCIUM mg/dL 9.4   ALK PHOS U/L 89   TOTAL PROTEIN g/dL 7.9   ALT (SGPT) U/L 16   AST (SGOT) U/L 21   BILIRUBIN mg/dL 0.5   ALBUMIN g/dL 4.2   GLOBULIN gm/dL 3.7       Lab Results - Last 18 Months   Lab Units 04/24/24  1406   CEA ng/mL 2.10       Lab Results - Last 18 Months   Lab Units 02/29/24  0859   TSH uIU/mL 2.460       Antwan Stevenson reports a pain score of 2.  Given his pain assessment as noted, treatment options were discussed and the following options were decided upon as a follow-up plan to address the patient's pain: continuation of current treatment plan for pain.      ASSESSMENT:  Adenocarcinoma the rectum. KIKE on 4/28/2024.  AJCC stage:IIIb (cT3, cN1b, cM0)  Treatment status: Pending. Pending neoadjuvant FOLFOX followed by CIV 5FU with radiation.   2.  Performance status of 0.  3.  Bibasilar pulmonary micronodules measuring up to 0.4 cm on 4/11/2024.  4.  ?Interstitial granulomatous dermatitis with arthritis. On prednisone 5 mg daily.             PLAN:   Re: CT chest report 5/3/2024. No definite evidence of metastatic disease in the chest.  Indeterminate 3 mm and 5 mm right lower lobe pulmonary nodules. Recommend a follow-up chest CT in 3 to 6 months to evaluate for stability. Hepatic steatosis. Cholelithiasis. Bilateral nonobstructing renal  calculi.  2.   Re: Pelvic MRI report on 4/25/2024. 4.5 cm circular solid lower rectum/anal canal neoplasm without evidence of mucinous features.  There is more inferior extent involving the anal canal at the 12:00/12:30 position and 6:00/6:30 position with involvement of the most inferior aspect of the right external anal sphincter. At the level of the anorectal junction there is also posterior extramural extent of tumor into the intersphincteric space abutting and likely involving the external anal sphincter. Two indeterminate right mesorectal lymph nodes.  3.   Re:  KIKE on 4/28/2024.  4.   Re: Note from Dr. Kaylie Stewart 5/1/2024. Plan for total neoadjuvant chemo. .  5.   Re: Note from Vera Cueva PA-C on 4/29/2024.  Plan for port placement.  6.   Re: Unremarkable heme status, unremarkable CMP and CEA at 2.1 on 4/24/2024.  7. Plan for total neoadjuvant FOLFOX to be followed by neoadjuvant CIV 5-FU with radiation.   8. Port placed 5/16/2024.   9.  Neoadjuvant FOLFOX for 12 cycles:  Aware of potential nausea, vomiting, diarrhea, cold intolerance, myelosuppression, anaphylaxis, alopecia or neuropathy.  10.  Schedule treatment C1 D1 to 3 on 5/21/2024 and C2 D1 to D3 on 6/4/2024.  To be administered every 2 weeks: Oxaliplatin 85 mg/m2 IVPB.  Leucovorin 400 mg/m2.  5- mg/ m2  IVP.  Begin 5-FU 2,400 mg/m2 IVPB over 46 hours.    11.    Premedicate with:  Aloxi 0.25 mg IVP.  Emend 150 mg IV.  Decadron 12 mg IVPB over 20-30 min.  12.   Weekly CBC with differential, magnesium, and CMP with chemo.  13.  eRx Zofran 8 mg p.o. every 8 hours as needed for nausea and vomiting #60, 2 refills.  14.  eRx Compazine 10 mg p.o. every 4 hours as needed for nausea and vomiting #60, 2 refills.  15.  Continue care per primary care physician and other specialists.  16.  Plan of care discussed with patient.  Understanding expressed.  Patient agreeable to proceed.  17.  Chemo education 5/20/2024.  18.  Return to office 6/18/2024.  19.  Advance Care Planning  ACP discussion was declined by the patient. Patient does not have an advance directive,  information provided. Repeat MRI pelvis and CT chest after neoadjuvant chemo.             I have reviewed the assessment and plan and verified the accuracy of it. No changes to assessment and plan since the information was documented. Raul Mayberry MD 05/16/24         I spent 41 total minutes, face-to-face, caring for Antwan today. Greater than 50% of this time involved counseling and/or coordination of care as documented within this note.            (Salty Arias DO)  (Kaylie Stewart MD)  (Bruno Sanford MD)  Duy Lomeli MD

## 2024-05-16 NOTE — ANESTHESIA POSTPROCEDURE EVALUATION
Patient: Antwan Stevenson    Procedure Summary       Date: 05/16/24 Room / Location:  [facility] OR 06 /  [facility]    Anesthesia Start: 1001 Anesthesia Stop: 1038    Procedure: Single Lumen Port-a-cath insertion with flouroscopy (Chin to Nipples) Diagnosis:       Rectal cancer      Encounter for care related to Port-a-Cath      (Rectal cancer [C20])      (Encounter for care related to Port-a-Cath [Z45.2])    Surgeons: Rosa Mccall MD Provider: Cristian Hoang CRNA    Anesthesia Type: MAC ASA Status: 2            Anesthesia Type: MAC    Vitals  No vitals data found for the desired time range.          Post Anesthesia Care and Evaluation    Patient location during evaluation: PHASE II  Patient participation: complete - patient participated  Level of consciousness: awake  Pain score: 0  Pain management: adequate    Airway patency: patent  Anesthetic complications: No anesthetic complications  PONV Status: none  Cardiovascular status: acceptable  Respiratory status: acceptable  Hydration status: acceptable    Comments: Blood pressure 155/81, pulse 80, temperature 97 °F (36.1 °C), temperature source Temporal, resp. rate 18, SpO2 96%.

## 2024-05-16 NOTE — DISCHARGE INSTRUCTIONS
Wound:   - you have skin glue on your incisions. Okay to shower tomorrow.   - Leave skin glue in place, it should slowly fall off over 2 weeks   - No swimming/soaking/bathing x 2 weeks to allow incisions to heal.     Activity:   - Activity as tolerated.   - No driving or operating machinery on narcotic pain medication.     Pain medication:   - Take 1000mg of tylenol every 8 hours for 3 days. After three days, take it prn.   - Take 600mg of ibuprofen (motrin) every 8 hours for 3 days. After three days, take it prn.   - You have a prescription for a narcotic. It will be ultram tabs. Take these only as needed after you have taken the tylenol and ibuprofen. If you are taking the ultram, make sure to take a stool softener (colace) with it as it can cause constipation.   - The narcotic may make you nauseated, you will have a prescription for zofran in case of nausea.     Follow up:   - make an appointment to see me in 2 weeks  - If you have any concerns before then, call me office at [phone]

## 2024-05-16 NOTE — ANESTHESIA PREPROCEDURE EVALUATION
Anesthesia Evaluation     Patient summary reviewed   no history of anesthetic complications:   NPO Solid Status: > 8 hours             Airway   Mallampati: II  No difficulty expected  Dental      Pulmonary - negative pulmonary ROS   Cardiovascular - negative cardio ROS  Exercise tolerance: good (4-7 METS)        Neuro/Psych- negative ROS  GI/Hepatic/Renal/Endo    (+) obesity, GI bleeding     Musculoskeletal     Abdominal    Substance History      OB/GYN          Other      history of cancer (rectal)                Anesthesia Plan    ASA 2     MAC     intravenous induction     Anesthetic plan, risks, benefits, and alternatives have been provided, discussed and informed consent has been obtained with: patient.    CODE STATUS:

## 2024-05-21 ENCOUNTER — DOCUMENTATION (OUTPATIENT)
Dept: RADIATION ONCOLOGY | Facility: HOSPITAL | Age: 64
End: 2024-05-21
Payer: MEDICARE

## 2024-05-21 ENCOUNTER — INFUSION (OUTPATIENT)
Dept: ONCOLOGY | Facility: HOSPITAL | Age: 64
End: 2024-05-21
Payer: MEDICARE

## 2024-05-21 ENCOUNTER — LAB (OUTPATIENT)
Dept: LAB | Facility: HOSPITAL | Age: 64
End: 2024-05-21
Payer: MEDICARE

## 2024-05-21 ENCOUNTER — CLINICAL SUPPORT (OUTPATIENT)
Dept: ONCOLOGY | Facility: CLINIC | Age: 64
End: 2024-05-21
Payer: MEDICARE

## 2024-05-21 VITALS
WEIGHT: 241.8 LBS | RESPIRATION RATE: 18 BRPM | DIASTOLIC BLOOD PRESSURE: 58 MMHG | HEIGHT: 66 IN | TEMPERATURE: 97.6 F | SYSTOLIC BLOOD PRESSURE: 145 MMHG | BODY MASS INDEX: 38.86 KG/M2 | OXYGEN SATURATION: 97 % | HEART RATE: 72 BPM

## 2024-05-21 DIAGNOSIS — C20 RECTAL CANCER: ICD-10-CM

## 2024-05-21 DIAGNOSIS — C20 RECTAL CANCER: Primary | ICD-10-CM

## 2024-05-21 LAB
ALBUMIN SERPL-MCNC: 4 G/DL (ref 3.5–5.2)
ALBUMIN/GLOB SERPL: 1.2 G/DL
ALP SERPL-CCNC: 87 U/L (ref 39–117)
ALT SERPL W P-5'-P-CCNC: 14 U/L (ref 1–41)
ANION GAP SERPL CALCULATED.3IONS-SCNC: 11 MMOL/L (ref 5–15)
AST SERPL-CCNC: 21 U/L (ref 1–40)
BASOPHILS # BLD MANUAL: 0 10*3/MM3 (ref 0–0.2)
BASOPHILS NFR BLD MANUAL: 0 % (ref 0–1.5)
BILIRUB SERPL-MCNC: 0.4 MG/DL (ref 0–1.2)
BUN SERPL-MCNC: 9 MG/DL (ref 8–23)
BUN/CREAT SERPL: 8.7 (ref 7–25)
CALCIUM SPEC-SCNC: 8.7 MG/DL (ref 8.6–10.5)
CHLORIDE SERPL-SCNC: 103 MMOL/L (ref 98–107)
CO2 SERPL-SCNC: 25 MMOL/L (ref 22–29)
CREAT SERPL-MCNC: 1.04 MG/DL (ref 0.76–1.27)
DEPRECATED RDW RBC AUTO: 40.4 FL (ref 37–54)
EGFRCR SERPLBLD CKD-EPI 2021: 79.2 ML/MIN/1.73
EOSINOPHIL # BLD MANUAL: 0 10*3/MM3 (ref 0–0.4)
EOSINOPHIL NFR BLD MANUAL: 0 % (ref 0.3–6.2)
ERYTHROCYTE [DISTWIDTH] IN BLOOD BY AUTOMATED COUNT: 13.2 % (ref 12.3–15.4)
GLOBULIN UR ELPH-MCNC: 3.4 GM/DL
GLUCOSE SERPL-MCNC: 98 MG/DL (ref 65–99)
HCT VFR BLD AUTO: 42.7 % (ref 37.5–51)
HGB BLD-MCNC: 13.2 G/DL (ref 13–17.7)
LYMPHOCYTES # BLD MANUAL: 1.74 10*3/MM3 (ref 0.7–3.1)
LYMPHOCYTES NFR BLD MANUAL: 15.2 % (ref 5–12)
MAGNESIUM SERPL-MCNC: 2.3 MG/DL (ref 1.6–2.4)
MCH RBC QN AUTO: 25.9 PG (ref 26.6–33)
MCHC RBC AUTO-ENTMCNC: 30.9 G/DL (ref 31.5–35.7)
MCV RBC AUTO: 83.7 FL (ref 79–97)
MONOCYTES # BLD: 0.82 10*3/MM3 (ref 0.1–0.9)
NEUTROPHILS # BLD AUTO: 2.84 10*3/MM3 (ref 1.7–7)
NEUTROPHILS NFR BLD MANUAL: 51.5 % (ref 42.7–76)
NEUTS BAND NFR BLD MANUAL: 1 % (ref 0–5)
PLAT MORPH BLD: NORMAL
PLATELET # BLD AUTO: 232 10*3/MM3 (ref 140–450)
PMV BLD AUTO: 10.2 FL (ref 6–12)
POTASSIUM SERPL-SCNC: 3.7 MMOL/L (ref 3.5–5.2)
PROT SERPL-MCNC: 7.4 G/DL (ref 6–8.5)
RBC # BLD AUTO: 5.1 10*6/MM3 (ref 4.14–5.8)
RBC MORPH BLD: NORMAL
SODIUM SERPL-SCNC: 139 MMOL/L (ref 136–145)
VARIANT LYMPHS NFR BLD MANUAL: 2 % (ref 0–5)
VARIANT LYMPHS NFR BLD MANUAL: 30.3 % (ref 19.6–45.3)
WBC MORPH BLD: NORMAL
WBC NRBC COR # BLD AUTO: 5.4 10*3/MM3 (ref 3.4–10.8)

## 2024-05-21 PROCEDURE — 25010000002 LEUCOVORIN CALCIUM PER 50 MG: Performed by: INTERNAL MEDICINE

## 2024-05-21 PROCEDURE — 96366 THER/PROPH/DIAG IV INF ADDON: CPT

## 2024-05-21 PROCEDURE — G0498 CHEMO EXTEND IV INFUS W/PUMP: HCPCS

## 2024-05-21 PROCEDURE — 0 DEXTROSE 5 % SOLUTION: Performed by: INTERNAL MEDICINE

## 2024-05-21 PROCEDURE — 36415 COLL VENOUS BLD VENIPUNCTURE: CPT

## 2024-05-21 PROCEDURE — 25010000002 DEXAMETHASONE SODIUM PHOSPHATE 100 MG/10ML SOLUTION: Performed by: INTERNAL MEDICINE

## 2024-05-21 PROCEDURE — 25010000002 PALONOSETRON PER 25 MCG: Performed by: INTERNAL MEDICINE

## 2024-05-21 PROCEDURE — 96368 THER/DIAG CONCURRENT INF: CPT

## 2024-05-21 PROCEDURE — 96413 CHEMO IV INFUSION 1 HR: CPT

## 2024-05-21 PROCEDURE — 25010000002 LEUCOVORIN 200 MG RECONSTITUTED SOLUTION 200 MG VIAL: Performed by: INTERNAL MEDICINE

## 2024-05-21 PROCEDURE — 85025 COMPLETE CBC W/AUTO DIFF WBC: CPT

## 2024-05-21 PROCEDURE — 80053 COMPREHEN METABOLIC PANEL: CPT

## 2024-05-21 PROCEDURE — 85007 BL SMEAR W/DIFF WBC COUNT: CPT

## 2024-05-21 PROCEDURE — 25010000002 FOSAPREPITANT PER 1 MG: Performed by: INTERNAL MEDICINE

## 2024-05-21 PROCEDURE — 96367 TX/PROPH/DG ADDL SEQ IV INF: CPT

## 2024-05-21 PROCEDURE — 96411 CHEMO IV PUSH ADDL DRUG: CPT

## 2024-05-21 PROCEDURE — 0 DEXTROSE 5 % SOLUTION 250 ML FLEX CONT: Performed by: INTERNAL MEDICINE

## 2024-05-21 PROCEDURE — 83735 ASSAY OF MAGNESIUM: CPT

## 2024-05-21 PROCEDURE — 25010000002 OXALIPLATIN PER 0.5 MG: Performed by: INTERNAL MEDICINE

## 2024-05-21 PROCEDURE — 96375 TX/PRO/DX INJ NEW DRUG ADDON: CPT

## 2024-05-21 PROCEDURE — 25010000002 FLUOROURACIL PER 500 MG: Performed by: INTERNAL MEDICINE

## 2024-05-21 PROCEDURE — 96415 CHEMO IV INFUSION ADDL HR: CPT

## 2024-05-21 RX ORDER — PALONOSETRON 0.05 MG/ML
0.25 INJECTION, SOLUTION INTRAVENOUS ONCE
Status: COMPLETED | OUTPATIENT
Start: 2024-05-21 | End: 2024-05-21

## 2024-05-21 RX ORDER — FAMOTIDINE 10 MG/ML
20 INJECTION, SOLUTION INTRAVENOUS AS NEEDED
Status: DISCONTINUED | OUTPATIENT
Start: 2024-05-21 | End: 2024-05-21 | Stop reason: HOSPADM

## 2024-05-21 RX ORDER — DEXTROSE MONOHYDRATE 50 MG/ML
20 INJECTION, SOLUTION INTRAVENOUS ONCE
Status: COMPLETED | OUTPATIENT
Start: 2024-05-21 | End: 2024-05-21

## 2024-05-21 RX ORDER — FLUOROURACIL 50 MG/ML
400 INJECTION, SOLUTION INTRAVENOUS ONCE
Status: COMPLETED | OUTPATIENT
Start: 2024-05-21 | End: 2024-05-21

## 2024-05-21 RX ORDER — LIDOCAINE AND PRILOCAINE 25; 25 MG/G; MG/G
CREAM TOPICAL
Qty: 1 EACH | Refills: 2 | Status: SHIPPED | OUTPATIENT
Start: 2024-05-21

## 2024-05-21 RX ORDER — DIPHENHYDRAMINE HYDROCHLORIDE 50 MG/ML
50 INJECTION INTRAMUSCULAR; INTRAVENOUS AS NEEDED
Status: DISCONTINUED | OUTPATIENT
Start: 2024-05-21 | End: 2024-05-21 | Stop reason: HOSPADM

## 2024-05-21 RX ORDER — LIDOCAINE AND PRILOCAINE 25; 25 MG/G; MG/G
CREAM TOPICAL
Qty: 30 G | Refills: 2 | Status: SHIPPED | OUTPATIENT
Start: 2024-05-21

## 2024-05-21 RX ADMIN — FOSAPREPITANT 150 MG: 150 INJECTION, POWDER, LYOPHILIZED, FOR SOLUTION INTRAVENOUS at 11:14

## 2024-05-21 RX ADMIN — DEXAMETHASONE SODIUM PHOSPHATE 12 MG: 10 INJECTION, SOLUTION INTRAMUSCULAR; INTRAVENOUS at 10:47

## 2024-05-21 RX ADMIN — LEUCOVORIN CALCIUM 860 MG: 200 INJECTION, POWDER, LYOPHILIZED, FOR SOLUTION INTRAMUSCULAR; INTRAVENOUS at 11:48

## 2024-05-21 RX ADMIN — DEXTROSE MONOHYDRATE 20 ML/HR: 50 INJECTION, SOLUTION INTRAVENOUS at 10:46

## 2024-05-21 RX ADMIN — PALONOSETRON HYDROCHLORIDE 0.25 MG: 0.25 INJECTION, SOLUTION INTRAVENOUS at 10:46

## 2024-05-21 RX ADMIN — FLUOROURACIL 860 MG: 50 INJECTION, SOLUTION INTRAVENOUS at 14:14

## 2024-05-21 RX ADMIN — FLUOROURACIL 5160 MG: 50 INJECTION, SOLUTION INTRAVENOUS at 14:24

## 2024-05-21 RX ADMIN — OXALIPLATIN 185 MG: 5 INJECTION, SOLUTION, CONCENTRATE INTRAVENOUS at 11:48

## 2024-05-21 NOTE — PROGRESS NOTES
Subjective     PATIENT NAME:  Antwan Stevenson  YOB: 1960  PATIENTS AGE:  64 y.o.  PATIENTS SEX:  male  DATE OF SERVICE:  05/21/2024  PROVIDER:  [facility]      ____________________PATIENT EDUCATION____________________    PATIENT EDUCATION:  Today I met with the patient Antwan Stevenson and his wife, to discuss the chemotherapy regimen (Folfox) Oxaliplatin, Leucovorin, 5-fu, recommended for treatment of his disease Rectal Cancer.  The patient was given explanation of treatment premed side effects including office policy that prohibits patients to drive if sedating medications are administered, MD explanation given regarding benefits, side effects, toxicities and goals of treatment.  The patient received a Chemotherapy/Biotherapy Plan Summary including diagnosis and specific treatment plan.    SIDE EFFECTS:  Common side effects were discussed with the patient and/or significant other.  Discussion included hair loss/discoloration, anemia/fatigue, infection/chills/fever, appetite, bleeding risk/precautions, constipation, diarrhea, mouth sores, taste alteration, loss of appetite,nausea/vomiting, peripheral neuropathy, skin/nail changes, rash, muscle aches/weakness, photosensitivity, weight gain/loss, hearing loss, dizziness, sterility, high blood pressure, heart damage, liver damage, lung damage, kidney damage, DVT/PE risk, fluid retention, pleural/pericardial effusion, somnolence, electrolyte/LFT imbalance, vein exercises and/or the possible need for vascular access/port placement.  The patient was advice that although uncommon, leakage of an infused medication from the vein or venous access device (port) may lead to skin breakdown and/or other tissue damage.  The patient was advised that he/she may have pain, bleeding, and/or bruising from the insertion of a needle in their vein or venous access device (port).  The patient was further advised that, in spite of proper technique, infection with redness  and irritation may rarely occur at the site where the needle was inserted.  The patient was advised that if complications occur, additional medical treatment is available.    Discussion also included side effects specific to drugs in the treatment plan, specifically: Oxaliplatin, Leucovorin, 5-FU   Discussed in length cold sensitivity related to Oxaliplatin    Protection during sexual relations.    A total of 60 minutes were spent with the patient, with 100% of time spent in education and counseling.

## 2024-05-21 NOTE — PROGRESS NOTES
ANGELITO met with Mr. Stevenson who is here to start treatment for rectal cancer. ANGELITO introduced self and explained role and source of support. He is 64 years old and lives with his wife and their two granddaughters ages are 7 and 8. 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 His wife works fulltime. He has a strong support system which includes his family and friends. He is a member at [facility] in [facility]. Currently, he does not have transportation or any immediate financial concerns. Mr. Stevenson does not have a supplemental insurance and this writer did inform him about the financial counselors at the hospital. He expressed feeling nervous due to this being his first treatment. He has positive coping strategies which include playing the guitar, gardening, and spending time with his family. He does not take any medication for anxiety/depression, and he does not see a counselor. He is looking forward to a vacation with his family to [place]. ANGELITO encouraged him to call if assistance is needed in the future.

## 2024-05-23 ENCOUNTER — INFUSION (OUTPATIENT)
Dept: ONCOLOGY | Facility: HOSPITAL | Age: 64
End: 2024-05-23
Payer: MEDICARE

## 2024-05-23 VITALS
SYSTOLIC BLOOD PRESSURE: 141 MMHG | HEART RATE: 67 BPM | RESPIRATION RATE: 18 BRPM | OXYGEN SATURATION: 97 % | TEMPERATURE: 97.2 F | DIASTOLIC BLOOD PRESSURE: 54 MMHG

## 2024-05-23 DIAGNOSIS — C20 RECTAL CANCER: Primary | ICD-10-CM

## 2024-05-23 DIAGNOSIS — Z95.828 PORT-A-CATH IN PLACE: ICD-10-CM

## 2024-05-23 PROCEDURE — 25010000002 HEPARIN LOCK FLUSH PER 10 UNITS: Performed by: INTERNAL MEDICINE

## 2024-05-23 RX ORDER — SODIUM CHLORIDE 0.9 % (FLUSH) 0.9 %
10 SYRINGE (ML) INJECTION AS NEEDED
Status: DISCONTINUED | OUTPATIENT
Start: 2024-05-23 | End: 2024-05-23 | Stop reason: HOSPADM

## 2024-05-23 RX ORDER — HEPARIN SODIUM (PORCINE) LOCK FLUSH IV SOLN 100 UNIT/ML 100 UNIT/ML
500 SOLUTION INTRAVENOUS AS NEEDED
OUTPATIENT
Start: 2024-05-23

## 2024-05-23 RX ORDER — SODIUM CHLORIDE 0.9 % (FLUSH) 0.9 %
10 SYRINGE (ML) INJECTION AS NEEDED
OUTPATIENT
Start: 2024-05-23

## 2024-05-23 RX ORDER — HEPARIN SODIUM (PORCINE) LOCK FLUSH IV SOLN 100 UNIT/ML 100 UNIT/ML
500 SOLUTION INTRAVENOUS AS NEEDED
Status: DISCONTINUED | OUTPATIENT
Start: 2024-05-23 | End: 2024-05-23 | Stop reason: HOSPADM

## 2024-05-23 RX ADMIN — Medication 10 ML: at 13:07

## 2024-05-23 RX ADMIN — Medication 500 UNITS: at 13:07

## 2024-05-28 DIAGNOSIS — Z95.828 PORT-A-CATH IN PLACE: ICD-10-CM

## 2024-05-28 DIAGNOSIS — C20 RECTAL CANCER: Primary | ICD-10-CM

## 2024-05-28 RX ORDER — FLUOROURACIL 50 MG/ML
400 INJECTION, SOLUTION INTRAVENOUS ONCE
OUTPATIENT
Start: 2024-06-04

## 2024-05-28 RX ORDER — DIPHENHYDRAMINE HYDROCHLORIDE 50 MG/ML
50 INJECTION INTRAMUSCULAR; INTRAVENOUS AS NEEDED
OUTPATIENT
Start: 2024-06-04

## 2024-05-28 RX ORDER — PALONOSETRON 0.05 MG/ML
0.25 INJECTION, SOLUTION INTRAVENOUS ONCE
OUTPATIENT
Start: 2024-06-04

## 2024-05-28 RX ORDER — DEXTROSE MONOHYDRATE 50 MG/ML
20 INJECTION, SOLUTION INTRAVENOUS ONCE
OUTPATIENT
Start: 2024-06-04

## 2024-05-28 RX ORDER — FAMOTIDINE 10 MG/ML
20 INJECTION, SOLUTION INTRAVENOUS AS NEEDED
OUTPATIENT
Start: 2024-06-04

## 2024-06-04 ENCOUNTER — LAB (OUTPATIENT)
Dept: LAB | Facility: HOSPITAL | Age: 64
End: 2024-06-04
Payer: MEDICARE

## 2024-06-04 ENCOUNTER — INFUSION (OUTPATIENT)
Dept: ONCOLOGY | Facility: HOSPITAL | Age: 64
End: 2024-06-04
Payer: MEDICARE

## 2024-06-04 VITALS
WEIGHT: 241.2 LBS | OXYGEN SATURATION: 100 % | TEMPERATURE: 97.2 F | DIASTOLIC BLOOD PRESSURE: 60 MMHG | RESPIRATION RATE: 18 BRPM | HEIGHT: 66 IN | HEART RATE: 70 BPM | BODY MASS INDEX: 38.76 KG/M2 | SYSTOLIC BLOOD PRESSURE: 140 MMHG

## 2024-06-04 DIAGNOSIS — Z95.828 PORT-A-CATH IN PLACE: ICD-10-CM

## 2024-06-04 DIAGNOSIS — T45.1X5A ANEMIA DUE TO ANTINEOPLASTIC CHEMOTHERAPY: Primary | ICD-10-CM

## 2024-06-04 DIAGNOSIS — C20 RECTAL CANCER: ICD-10-CM

## 2024-06-04 DIAGNOSIS — T45.1X5A ANEMIA DUE TO ANTINEOPLASTIC CHEMOTHERAPY: ICD-10-CM

## 2024-06-04 DIAGNOSIS — D64.81 ANEMIA DUE TO ANTINEOPLASTIC CHEMOTHERAPY: ICD-10-CM

## 2024-06-04 DIAGNOSIS — D64.81 ANEMIA DUE TO ANTINEOPLASTIC CHEMOTHERAPY: Primary | ICD-10-CM

## 2024-06-04 DIAGNOSIS — C20 RECTAL CANCER: Primary | ICD-10-CM

## 2024-06-04 LAB
ALBUMIN SERPL-MCNC: 4.1 G/DL (ref 3.5–5.2)
ALBUMIN/GLOB SERPL: 1.3 G/DL
ALP SERPL-CCNC: 91 U/L (ref 39–117)
ALT SERPL W P-5'-P-CCNC: 20 U/L (ref 1–41)
ANION GAP SERPL CALCULATED.3IONS-SCNC: 8 MMOL/L (ref 5–15)
AST SERPL-CCNC: 23 U/L (ref 1–40)
BASOPHILS # BLD MANUAL: 0.05 10*3/MM3 (ref 0–0.2)
BASOPHILS NFR BLD MANUAL: 1 % (ref 0–1.5)
BILIRUB SERPL-MCNC: 0.4 MG/DL (ref 0–1.2)
BUN SERPL-MCNC: 12 MG/DL (ref 8–23)
BUN/CREAT SERPL: 14 (ref 7–25)
CALCIUM SPEC-SCNC: 9.1 MG/DL (ref 8.6–10.5)
CHLORIDE SERPL-SCNC: 104 MMOL/L (ref 98–107)
CO2 SERPL-SCNC: 28 MMOL/L (ref 22–29)
CREAT SERPL-MCNC: 0.86 MG/DL (ref 0.76–1.27)
DEPRECATED RDW RBC AUTO: 43.3 FL (ref 37–54)
EGFRCR SERPLBLD CKD-EPI 2021: 95.5 ML/MIN/1.73
EOSINOPHIL # BLD MANUAL: 0 10*3/MM3 (ref 0–0.4)
EOSINOPHIL NFR BLD MANUAL: 0 % (ref 0.3–6.2)
ERYTHROCYTE [DISTWIDTH] IN BLOOD BY AUTOMATED COUNT: 14.4 % (ref 12.3–15.4)
FERRITIN SERPL-MCNC: 263 NG/ML (ref 30–400)
GLOBULIN UR ELPH-MCNC: 3.1 GM/DL
GLUCOSE SERPL-MCNC: 97 MG/DL (ref 65–99)
HCT VFR BLD AUTO: 39 % (ref 37.5–51)
HGB BLD-MCNC: 12.3 G/DL (ref 13–17.7)
IRON 24H UR-MRATE: 97 MCG/DL (ref 59–158)
IRON SATN MFR SERPL: 30 % (ref 20–50)
LYMPHOCYTES # BLD MANUAL: 1.58 10*3/MM3 (ref 0.7–3.1)
LYMPHOCYTES NFR BLD MANUAL: 30.6 % (ref 5–12)
MAGNESIUM SERPL-MCNC: 2.2 MG/DL (ref 1.6–2.4)
MCH RBC QN AUTO: 26.5 PG (ref 26.6–33)
MCHC RBC AUTO-ENTMCNC: 31.5 G/DL (ref 31.5–35.7)
MCV RBC AUTO: 83.9 FL (ref 79–97)
MICROCYTES BLD QL: ABNORMAL
MONOCYTES # BLD: 1.58 10*3/MM3 (ref 0.1–0.9)
NEUTROPHILS # BLD AUTO: 1.95 10*3/MM3 (ref 1.7–7)
NEUTROPHILS NFR BLD MANUAL: 37.8 % (ref 42.7–76)
PLAT MORPH BLD: NORMAL
PLATELET # BLD AUTO: 176 10*3/MM3 (ref 140–450)
PMV BLD AUTO: 10 FL (ref 6–12)
POTASSIUM SERPL-SCNC: 4.1 MMOL/L (ref 3.5–5.2)
PROT SERPL-MCNC: 7.2 G/DL (ref 6–8.5)
RBC # BLD AUTO: 4.65 10*6/MM3 (ref 4.14–5.8)
RETICS # AUTO: 0.1 10*6/MM3 (ref 0.02–0.13)
RETICS/RBC NFR AUTO: 2.1 % (ref 0.7–1.9)
SODIUM SERPL-SCNC: 140 MMOL/L (ref 136–145)
TIBC SERPL-MCNC: 328 MCG/DL (ref 298–536)
TRANSFERRIN SERPL-MCNC: 220 MG/DL (ref 200–360)
VARIANT LYMPHS NFR BLD MANUAL: 10.2 % (ref 0–5)
VARIANT LYMPHS NFR BLD MANUAL: 20.4 % (ref 19.6–45.3)
WBC MORPH BLD: NORMAL
WBC NRBC COR # BLD AUTO: 5.15 10*3/MM3 (ref 3.4–10.8)

## 2024-06-04 PROCEDURE — 96411 CHEMO IV PUSH ADDL DRUG: CPT

## 2024-06-04 PROCEDURE — 96415 CHEMO IV INFUSION ADDL HR: CPT

## 2024-06-04 PROCEDURE — 25010000002 LEUCOVORIN 200 MG RECONSTITUTED SOLUTION 200 MG VIAL: Performed by: INTERNAL MEDICINE

## 2024-06-04 PROCEDURE — 25010000002 DEXAMETHASONE SODIUM PHOSPHATE 100 MG/10ML SOLUTION: Performed by: INTERNAL MEDICINE

## 2024-06-04 PROCEDURE — G0498 CHEMO EXTEND IV INFUS W/PUMP: HCPCS

## 2024-06-04 PROCEDURE — 85045 AUTOMATED RETICULOCYTE COUNT: CPT

## 2024-06-04 PROCEDURE — 25010000002 FOSAPREPITANT PER 1 MG: Performed by: INTERNAL MEDICINE

## 2024-06-04 PROCEDURE — 96368 THER/DIAG CONCURRENT INF: CPT

## 2024-06-04 PROCEDURE — 25010000002 PALONOSETRON PER 25 MCG: Performed by: INTERNAL MEDICINE

## 2024-06-04 PROCEDURE — 96375 TX/PRO/DX INJ NEW DRUG ADDON: CPT

## 2024-06-04 PROCEDURE — 96413 CHEMO IV INFUSION 1 HR: CPT

## 2024-06-04 PROCEDURE — 0 DEXTROSE 5 % SOLUTION: Performed by: INTERNAL MEDICINE

## 2024-06-04 PROCEDURE — 82728 ASSAY OF FERRITIN: CPT

## 2024-06-04 PROCEDURE — 80053 COMPREHEN METABOLIC PANEL: CPT

## 2024-06-04 PROCEDURE — 96367 TX/PROPH/DG ADDL SEQ IV INF: CPT

## 2024-06-04 PROCEDURE — 83735 ASSAY OF MAGNESIUM: CPT

## 2024-06-04 PROCEDURE — 85007 BL SMEAR W/DIFF WBC COUNT: CPT

## 2024-06-04 PROCEDURE — 25010000002 OXALIPLATIN PER 0.5 MG: Performed by: INTERNAL MEDICINE

## 2024-06-04 PROCEDURE — 83540 ASSAY OF IRON: CPT

## 2024-06-04 PROCEDURE — 36415 COLL VENOUS BLD VENIPUNCTURE: CPT

## 2024-06-04 PROCEDURE — 25010000002 LEUCOVORIN CALCIUM PER 50 MG: Performed by: INTERNAL MEDICINE

## 2024-06-04 PROCEDURE — 0 DEXTROSE 5 % SOLUTION 250 ML FLEX CONT: Performed by: INTERNAL MEDICINE

## 2024-06-04 PROCEDURE — 84466 ASSAY OF TRANSFERRIN: CPT

## 2024-06-04 PROCEDURE — 85025 COMPLETE CBC W/AUTO DIFF WBC: CPT

## 2024-06-04 PROCEDURE — 25010000002 FLUOROURACIL PER 500 MG: Performed by: INTERNAL MEDICINE

## 2024-06-04 RX ORDER — DEXTROSE MONOHYDRATE 50 MG/ML
20 INJECTION, SOLUTION INTRAVENOUS ONCE
Status: COMPLETED | OUTPATIENT
Start: 2024-06-04 | End: 2024-06-04

## 2024-06-04 RX ORDER — FAMOTIDINE 10 MG/ML
20 INJECTION, SOLUTION INTRAVENOUS AS NEEDED
Status: DISCONTINUED | OUTPATIENT
Start: 2024-06-04 | End: 2024-06-04 | Stop reason: HOSPADM

## 2024-06-04 RX ORDER — FLUOROURACIL 50 MG/ML
400 INJECTION, SOLUTION INTRAVENOUS ONCE
Status: COMPLETED | OUTPATIENT
Start: 2024-06-04 | End: 2024-06-04

## 2024-06-04 RX ORDER — PALONOSETRON 0.05 MG/ML
0.25 INJECTION, SOLUTION INTRAVENOUS ONCE
Status: COMPLETED | OUTPATIENT
Start: 2024-06-04 | End: 2024-06-04

## 2024-06-04 RX ORDER — DIPHENHYDRAMINE HYDROCHLORIDE 50 MG/ML
50 INJECTION INTRAMUSCULAR; INTRAVENOUS AS NEEDED
Status: DISCONTINUED | OUTPATIENT
Start: 2024-06-04 | End: 2024-06-04 | Stop reason: HOSPADM

## 2024-06-04 RX ADMIN — PALONOSETRON HYDROCHLORIDE 0.25 MG: 0.25 INJECTION, SOLUTION INTRAVENOUS at 09:16

## 2024-06-04 RX ADMIN — FLUOROURACIL 860 MG: 50 INJECTION, SOLUTION INTRAVENOUS at 12:34

## 2024-06-04 RX ADMIN — FLUOROURACIL 5160 MG: 50 INJECTION, SOLUTION INTRAVENOUS at 12:41

## 2024-06-04 RX ADMIN — OXALIPLATIN 185 MG: 5 INJECTION, SOLUTION, CONCENTRATE INTRAVENOUS at 10:08

## 2024-06-04 RX ADMIN — FOSAPREPITANT 150 MG: 150 INJECTION, POWDER, LYOPHILIZED, FOR SOLUTION INTRAVENOUS at 09:35

## 2024-06-04 RX ADMIN — DEXTROSE MONOHYDRATE 20 ML/HR: 50 INJECTION, SOLUTION INTRAVENOUS at 09:14

## 2024-06-04 RX ADMIN — DEXAMETHASONE SODIUM PHOSPHATE 12 MG: 10 INJECTION, SOLUTION INTRAMUSCULAR; INTRAVENOUS at 09:14

## 2024-06-04 RX ADMIN — LEUCOVORIN CALCIUM 860 MG: 350 INJECTION, POWDER, LYOPHILIZED, FOR SUSPENSION INTRAMUSCULAR; INTRAVENOUS at 10:08

## 2024-06-04 NOTE — PROGRESS NOTES
[facility] HEMATOLOGY & ONCOLOGY  [address]  [facility] [address] [phone]    Patient Name: Antwan Stevenson  Encounter Date: 06/18/2024  YOB: 1960  Patient Number: [id number]      REASON FOR FOLLOW-UP: Antwan Stevenson is a pleasant 64 y.o.  male who is seen on follow-up for rectal cancer.  He is seen to start C3D1 of neoadjuvant FOLFOX.  However, ANC 1.2.  He is seen with nurse Tennille at the chemo suite. History is obtained from patient.  The patient is a reliable historian.        DIAGNOSTIC ABNORMALITIES:   He presented with hematochezia.  Patient seen by DESTIN Martinez 3/20/2024.  He reports frequent rectal bleeding.  Patient scheduled for colonoscopy.  CT abdomen pelvis 4/11/2024.Bilateral nephrolithiasis without obstructive uropathy.   Cholelithiasis.   Left pelvic spigelian hernia containing a portion of sigmoid colon.  No bowel obstruction or inflammation. Fatty supraumbilical and bilateral inguinal hernias present as well.  Evidence of prior umbilical hernia repair.  Bibasilar pulmonary micronodules measuring up to 0.4 cm.  Chest CT follow-up in 12 months recommended unless shorter interval follow-up is warranted by history.   Colonoscopy 4/17/2024.  Anal mass. The colon was normal from the rectum to the mid transverse, as I had to use an endoscope due to the severe anal stenosis.  No specimens collected.  The digital rectal exam revealed a firm, hard and obstructing anal mass. The mass was circumferential. Findings: The entire examined colon appeared normal  Pathology report 4/18/2024. 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  Phone note 4/18/2024.  Patient referred to oncology and colorectal surgery at [facility].  CEA at 2.1 on 4/24/2024.  Pelvic MRI report on 4/25/2024. 4.5 cm circular solid lower rectum/anal canal neoplasm without evidence of mucinous features.  There is more inferior extent involving the anal canal at the 12:00/12:30 position and 6:00/6:30 position with involvement of the most inferior aspect of the right external anal sphincter. At the level of the anorectal junction there is also posterior extramural extent of tumor into the intersphincteric space abutting and likely involving the external anal sphincter. Two indeterminate right mesorectal lymph nodes.   Note from Dr. Kaylie Stewart 5/1/2024. Plan for total neoadjuvant chemo.  CT chest report 5/3/2024. No definite evidence of metastatic disease in the chest.  Indeterminate 3 mm and 5 mm right lower lobe pulmonary nodules. Recommend a follow-up chest CT in 3 to 6 months to evaluate for stability. Hepatic steatosis. Cholelithiasis. Bilateral nonobstructing renal calculi.         PREVIOUS INTERVENTIONS:   Neoadjuvant FOLFOX 5/21/2024 through present.          Oncology/Hematology History   Rectal cancer   4/24/2024 Initial Diagnosis    Rectal cancer     5/21/2024 -  Chemotherapy    OP COLON [id number] OXALIplatin / Leucovorin / Fluorouracil         PAST MEDICAL HISTORY:  ALLERGIES:  No Known Allergies  CURRENT MEDICATIONS:  Outpatient Encounter Medications as of 6/18/2024   Medication Sig Dispense Refill    acetaminophen (Tylenol) 325 MG tablet Take 3 tablets by mouth Every 8 (Eight) Hours. Take every 8 hours for 3 days then take prn as needed.      albuterol sulfate  (90 Base) MCG/ACT inhaler Inhale 2 puffs Every 6 (Six) Hours As Needed.      HYDROcodone-acetaminophen (NORCO) 5-325 MG per tablet Take 1 tablet by mouth Every 6 (Six) Hours As Needed for Mild Pain or Moderate Pain. (Patient taking differently: Take 0.5 tablets by mouth At Night As Needed for Mild Pain or Moderate Pain (rectal pain from cancer).) 100 tablet 0    ibuprofen (ADVIL,MOTRIN) 200 MG tablet Take 2 tablets by mouth Every 6 (Six) Hours As Needed for Mild Pain.      ibuprofen (Motrin IB) 200 MG tablet Take 3 tablets by mouth Every  8 (Eight) Hours. Take every 8 hours for three days then take as needed.      lidocaine-prilocaine (EMLA) 2.5-2.5 % cream 30 minute apply generous amount of Emla Cream to port site and cover with clear plastic wrap until ready for use 1 each 2    lidocaine-prilocaine (EMLA) 2.5-2.5 % cream 30 minutes prior to treatment apply generous amount of Emla Cream to your port site and cover with clear plastic wrap until ready for use 30 g 2    loratadine (CLARITIN) 10 MG tablet Take 1 tablet by mouth Daily.      multivitamin (THERAGRAN) tablet tablet Take 1 tablet by mouth Daily.      ondansetron (Zofran) 4 MG tablet Take 1 tablet by mouth Every 8 (Eight) Hours As Needed for Nausea or Vomiting. 15 tablet 0    ondansetron (Zofran) 8 MG tablet Take 1 tablet by mouth Every 8 (Eight) Hours As Needed for Nausea or Vomiting. 60 tablet 2    predniSONE (DELTASONE) 5 MG tablet Take 1 tablet by mouth Daily.      prochlorperazine (COMPAZINE) 10 MG tablet Take 1 tablet by mouth Every 4 (Four) Hours As Needed for Nausea or Vomiting. 60 tablet 2    temazepam (RESTORIL) 7.5 MG capsule Take 1 capsule by mouth At Night As Needed for Sleep. HAS NOT STARTED THIS Rx      traMADol (ULTRAM) 50 MG tablet Take 1 tablet by mouth Every 6 (Six) Hours As Needed for Moderate Pain. 180 tablet 0     No facility-administered encounter medications on file as of 6/18/2024.     ADULT ILLNESSES:  Patient Active Problem List   Diagnosis Code    Rectal bleed K62.5    Rectal cancer C20    Port-A-Cath in place Z95.828     SURGERIES:  Past Surgical History:   Procedure Laterality Date    COLONOSCOPY N/A 4/17/2024    Procedure: COLONOSCOPY WITH ANESTHESIA;  Surgeon: Salty Arias DO;  Location: [facility] ENDOSCOPY;  Service: Gastroenterology;  Laterality: N/A;  preop; rectal bleed   postop questionable anal mass   PCP Duy Lomeli    HEMORROIDECTOMY      HERNIA REPAIR      VENOUS ACCESS DEVICE (PORT) INSERTION N/A 5/16/2024    Procedure: Single Lumen Port-a-cath  "insertion with flouroscopy;  Surgeon: Rosa Mccall MD;  Location: [facility] OR;  Service: General;  Laterality: N/A;     HEALTH MAINTENANCE ITEMS:  Health Maintenance Due   Topic Date Due    BMI FOLLOWUP  Never done    TDAP/TD VACCINES (1 - Tdap) Never done    ZOSTER VACCINE (1 of 2) Never done    RSV Vaccine - Adults (1 - 1-dose 60+ series) Never done    Pneumococcal Vaccine 65+ (1 of 1 - PCV) Never done    COVID-19 Vaccine (4 - 2023-24 season) 09/01/2023    HEPATITIS C SCREENING  Never done    ANNUAL WELLNESS VISIT  Never done       <no information>  Last Completed Colonoscopy            Discontinued - COLORECTAL CANCER SCREENING  Discontinued        Frequency changed to Never automatically (Topic No Longer Applies)    05/01/2024  Outside Procedure: COLONOSCOPY    04/17/2024  Colonoscopy    04/17/2024  Surgical Procedure: COLONOSCOPY                  Immunization History   Administered Date(s) Administered    COVID-19 (MODERNA) 1st,2nd,3rd Dose Monovalent 04/22/2021, 05/24/2021    COVID-19 (MODERNA) Monovalent Original Booster 01/04/2022     Last Completed Mammogram       This patient has no relevant Health Maintenance data.              FAMILY HISTORY:  Family History   Problem Relation Age of Onset    Colon cancer Neg Hx     Colon polyps Neg Hx     Esophageal cancer Neg Hx      SOCIAL HISTORY:  Social History     Socioeconomic History    Marital status:    Tobacco Use    Smoking status: Never    Smokeless tobacco: Never   Vaping Use    Vaping status: Never Used   Substance and Sexual Activity    Alcohol use: Never    Drug use: Never    Sexual activity: Defer       REVIEW OF SYSTEMS:    Review of Systems   Constitutional:  Negative for fatigue, fever and unexpected weight change.        \"Fine.\"   HENT:  Negative for congestion and mouth sores.    Eyes:  Negative for discharge and redness.   Respiratory:  Negative for shortness of breath and wheezing.    Cardiovascular:  Negative for chest pain and " "palpitations.   Gastrointestinal:  Negative for nausea and vomiting.   Endocrine: Negative for cold intolerance and heat intolerance.   Genitourinary:  Negative for difficulty urinating and dysuria.   Musculoskeletal:  Negative for gait problem and myalgias.   Skin:  Positive for pallor.   Allergic/Immunologic: Negative for food allergies.   Neurological:  Negative for dizziness, facial asymmetry and speech difficulty.   Hematological:  Negative for adenopathy.   Psychiatric/Behavioral:  Negative for agitation, confusion and hallucinations.        VITAL SIGNS: /56   Pulse 90   Temp 97.6 °F (36.4 °C)   Resp 18   Ht 167.6 cm (65.98\")   Wt 108 kg (237 lb)   SpO2 96%   BMI 38.28 kg/m²   Pain Score    06/18/24 0919   PainSc: 0-No pain       PHYSICAL EXAMINATION:     Physical Exam  Vitals reviewed.   Constitutional:       General: He is not in acute distress.  HENT:      Head: Normocephalic and atraumatic.   Eyes:      General: No scleral icterus.  Cardiovascular:      Rate and Rhythm: Normal rate.   Pulmonary:      Effort: No respiratory distress.      Breath sounds: No wheezing.      Comments: Port, right, no erythema.  Abdominal:      General: Bowel sounds are normal. There is no distension.      Palpations: Abdomen is soft.   Musculoskeletal:         General: No swelling.      Cervical back: Neck supple.   Skin:     Coloration: Skin is pale.   Neurological:      Mental Status: He is alert and oriented to person, place, and time.   Psychiatric:         Mood and Affect: Mood normal.         Behavior: Behavior normal.         Thought Content: Thought content normal.         Judgment: Judgment normal.         LABS    Lab Results - Last 18 Months   Lab Units 06/18/24  0744 06/04/24  0722 05/21/24  0738 04/24/24  1406 02/29/24  0859   HEMOGLOBIN g/dL 12.6* 12.3* 13.2 13.3 14.2   HEMATOCRIT % 40.2 39.0 42.7 42.7 46.0   MCV fL 82.5 83.9 83.7 83.1 84.4   WBC 10*3/mm3 4.02 5.15 5.40 4.86 6.2   RDW % 14.4 14.4 13.2 " 13.2 13.8   MPV fL 9.7 10.0 10.2 9.8 10.1   PLATELETS 10*3/mm3 148 176 232 239 298   NEUTROS ABS 10*3/mm3 1.29* 1.95 2.84 2.46 3.5   LYMPHS ABS K/uL  --   --   --   --  1.6   MONOS ABS K/uL  --   --   --   --  1.00*   EOS ABS 10*3/mm3 0.00 0.00 0.00  --  0.06   BASOS ABS 10*3/mm3 0.04 0.05 0.00 0.10 0.10   IMMATURE GRANS (ABS) K/uL  --   --   --   --  0.0   NEUTROPHIL % % 32.0* 37.8* 51.5 47.4  --    MONOCYTES % % 15.0* 30.6* 15.2* 12.4*  --    BASOPHIL % % 1.0 1.0 0.0 2.1*  --    ATYP LYMPH % % 8.0* 10.2* 2.0 20.6*  --        Lab Results - Last 18 Months   Lab Units 06/18/24  0744 06/04/24  0722 05/21/24  0738 04/24/24  1406   GLUCOSE mg/dL 117* 97 98 98   SODIUM mmol/L 139 140 139 138   POTASSIUM mmol/L 3.9 4.1 3.7 4.4   CO2 mmol/L 24.0 28.0 25.0 25.0   CHLORIDE mmol/L 104 104 103 103   ANION GAP mmol/L 11.0 8.0 11.0 10.0   CREATININE mg/dL 0.96 0.86 1.04 0.90   BUN mg/dL 14 12 9 10   BUN / CREAT RATIO  14.6 14.0 8.7 11.1   CALCIUM mg/dL 9.2 9.1 8.7 9.4   ALK PHOS U/L 95 91 87 89   TOTAL PROTEIN g/dL 7.1 7.2 7.4 7.9   ALT (SGPT) U/L 21 20 14 16   AST (SGOT) U/L 25 23 21 21   BILIRUBIN mg/dL 0.5 0.4 0.4 0.5   ALBUMIN g/dL 4.0 4.1 4.0 4.2   GLOBULIN gm/dL 3.1 3.1 3.4 3.7       Lab Results - Last 18 Months   Lab Units 04/24/24  1406   CEA ng/mL 2.10       Lab Results - Last 18 Months   Lab Units 06/04/24  0722 02/29/24  0859   IRON mcg/dL 97  --    TIBC mcg/dL 328  --    IRON SATURATION (TSAT) % 30  --    FERRITIN ng/mL 263.00  --    TSH uIU/mL  --  2.460       Antwan Stevenson reports a pain score of 0.         ASSESSMENT:  Adenocarcinoma the rectum. KIKE on 4/28/2024.  AJCC stage:IIIb (cT3, cN1b, cM0)  Treatment status: He is on neoadjuvant FOLFOX from 5/21/2024 to present.  To be followed by CIV 5FU with radiation.   2.  Performance status of 0.  3.  Grade 2 neutropenia without fever from chemo.  Add pegfilgrastim as prophylaxis.  4.  Normocytic anemia secondary to chemo.  5.  ?Interstitial granulomatous dermatitis  "with arthritis. On prednisone 5 mg daily.    6.  Bibasilar pulmonary micronodules measuring up to 0.4 cm on 4/11/2024.  On observation.        PLAN:   Re: Tolerance to neoadjuvant FOLFOX.  \"Tolerating chemo.\"  2.   Re:  Heme status.  WBC 4.02, hemoglobin 12.6, hematocrit 40.2, MCV 82.5 and platelet 148.  B12 pending and folate pending.  3.   Re:  CMP. GFR 87.2 ml/min.  4.   Re:  Magnesium 2.1.  5.   Re: Ferritin 263.  6.   Re: Iron saturation 30%.  7.  Proceed with total neoadjuvant FOLFOX if ANC at least 1.5 and to be followed by neoadjuvant CIV 5-FU with radiation.   8.  Neoadjuvant FOLFOX for total of 12 cycles:  9.  Monitor for infusion reactions, anaphylaxis, nausea, vomiting, diarrhea, cold intolerance, myelosuppression, alopecia or neuropathy.  10.  Schedule treatment C3 D1 to 3 on 6/25/2024 and C4 D1 to D3 on 7/9/2024.  To be administered every 2 weeks: Oxaliplatin 85 mg/m2 IVPB.  Leucovorin 400 mg/m2.  5- mg/ m2  IVP.  Begin 5-FU 2,400 mg/m2 IVPB over 46 hours.    11.    Premedicate with:  Aloxi 0.25 mg IVP.  Emend 150 mg IV.  Decadron 12 mg IVPB over 20-30 min.  12.  PEG filgrastim D3 as primary prophylaxis.  Cycle 3 on 6/18/2024 delayed due to ANC of 1.2.  Monitor for arthralgias.  13.   Weekly CBC with differential, magnesium, and CMP with FOLFOX.  14.  eRx ondansetron 8 mg p.o. every 8 hours as needed for nausea and vomiting #60, 2 refills if needed.  15.  eRx Compazine 10 mg p.o. every 4 hours as needed for nausea and vomiting #60, 2 refills if needed.  16.  eRx tramadol 50 mg p.o. every 6 hours as needed for pain, #180, 0 refill if needed.  17.  Continue care per primary care physician and the other specialists.  18.  Plan of care discussed with patient and nurse Null at the chemo suite.  Understanding expressed.  He agreed to proceed.  19.  Return to office in 4 weeks.  20.  Advance Care Planning  ACP discussion was declined by the patient. Patient " does not have an advance directive, information provided. Repeat MRI pelvis and CT chest after neoadjuvant chemo.          I have reviewed the assessment and plan and verified the accuracy of it. No changes to assessment and plan since the information was documented. Raul Mayberry MD 06/18/24         I spent 34 total minutes, face-to-face, caring for Antwan today. Greater than 50% of this time involved counseling and/or coordination of care as documented within this note.          (Salty Arias DO)  (Kaylie Stewart MD)  Celi Benitez MD radiation oncology [facility].  (Bruno Sanford MD)  Duy Lomeli MD

## 2024-06-04 NOTE — PROGRESS NOTES
Message to office: Antwan Stevenson [birth year] here for FOLFOX. Pt wondering if he can continue to take 800mg ibuprofen prescribed by PCP, would like to take before treatment today but wanted to make sure it is ok? Pt went to [place] Friday after tx but stayed out of sun mostly. Rash on face started Monday or Tuesday after 1st treatment, was itchy like dry skin and warm. It is still present but better and not itchy or warm. Please advise, thanks.   Per DEYSI Caceres/Dr. Mayberry: Tennille:  Yes its okay to continue with the Ibuprofen as long as plts are good, and his are today. Ok to proceed with rash.     Message to office per pt request: antwan stevenson [birth year] is wondering if you can fax his CT report done on 4/11 to [facility]. Fax number is [phone]. DEYSI Caceres verified they will fax report.

## 2024-06-06 ENCOUNTER — INFUSION (OUTPATIENT)
Dept: ONCOLOGY | Facility: HOSPITAL | Age: 64
End: 2024-06-06
Payer: MEDICARE

## 2024-06-06 ENCOUNTER — OFFICE VISIT (OUTPATIENT)
Dept: SURGERY | Facility: CLINIC | Age: 64
End: 2024-06-06
Payer: MEDICARE

## 2024-06-06 VITALS
HEIGHT: 66 IN | SYSTOLIC BLOOD PRESSURE: 131 MMHG | RESPIRATION RATE: 18 BRPM | OXYGEN SATURATION: 100 % | BODY MASS INDEX: 39.05 KG/M2 | HEART RATE: 60 BPM | WEIGHT: 243 LBS | TEMPERATURE: 97.3 F | DIASTOLIC BLOOD PRESSURE: 56 MMHG

## 2024-06-06 VITALS
SYSTOLIC BLOOD PRESSURE: 124 MMHG | DIASTOLIC BLOOD PRESSURE: 60 MMHG | BODY MASS INDEX: 39.05 KG/M2 | HEIGHT: 66 IN | WEIGHT: 243 LBS

## 2024-06-06 DIAGNOSIS — Z95.828 PORT-A-CATH IN PLACE: ICD-10-CM

## 2024-06-06 DIAGNOSIS — C20 RECTAL CANCER: Primary | ICD-10-CM

## 2024-06-06 DIAGNOSIS — Z95.828 PORT-A-CATH IN PLACE: Primary | ICD-10-CM

## 2024-06-06 PROCEDURE — 25010000002 HEPARIN LOCK FLUSH PER 10 UNITS: Performed by: INTERNAL MEDICINE

## 2024-06-06 RX ORDER — HEPARIN SODIUM (PORCINE) LOCK FLUSH IV SOLN 100 UNIT/ML 100 UNIT/ML
500 SOLUTION INTRAVENOUS AS NEEDED
Status: DISCONTINUED | OUTPATIENT
Start: 2024-06-06 | End: 2024-06-06 | Stop reason: HOSPADM

## 2024-06-06 RX ORDER — HEPARIN SODIUM (PORCINE) LOCK FLUSH IV SOLN 100 UNIT/ML 100 UNIT/ML
500 SOLUTION INTRAVENOUS AS NEEDED
OUTPATIENT
Start: 2024-06-06

## 2024-06-06 RX ORDER — SODIUM CHLORIDE 0.9 % (FLUSH) 0.9 %
10 SYRINGE (ML) INJECTION AS NEEDED
OUTPATIENT
Start: 2024-06-06

## 2024-06-06 RX ORDER — SODIUM CHLORIDE 0.9 % (FLUSH) 0.9 %
10 SYRINGE (ML) INJECTION AS NEEDED
Status: DISCONTINUED | OUTPATIENT
Start: 2024-06-06 | End: 2024-06-06 | Stop reason: HOSPADM

## 2024-06-06 RX ADMIN — Medication 10 ML: at 12:18

## 2024-06-06 RX ADMIN — HEPARIN 500 UNITS: 100 SYRINGE at 12:18

## 2024-06-06 NOTE — PROGRESS NOTES
"Patient: Antwan Stevenson    YOB: 1960    Date: 06/06/2024    Primary Care Provider: Duy Lomeli MD    Vital Signs:   Vitals:    06/06/24 1349   BP: 124/60   BP Location: Left arm   Patient Position: Sitting   Cuff Size: Large Adult   Weight: 110 kg (243 lb)   Height: 167.6 cm (65.98\")       The patient is tolerating a regular diet and has no complaints s/p port placement with Dr. Mccall on 5/16/24. The patient denies fevers, chills, nausea, vomiting, and excessive pain. Incision is healing well with no signs of infection or wound dehiscence.      Assessment / Plan:    Diagnoses and all orders for this visit:    1. Port-A-Cath in place (Primary)        Antwan Stevenson is 2 weeks s/p port placement. He is overall doing well. At this time, the patient is able to return to normal activity as tolerated. He voiced understanding of this. I instructed him that if there are any problems with the port, such as the inability to flush or access the port, to give our office a call for an appointment. He is agreeable to the plan.       Follow up:     Return if symptoms worsen or fail to improve.      Electronically signed by Vera Cueva PA-C  06/06/24  14:17 CDT                   "

## 2024-06-10 ENCOUNTER — TELEPHONE (OUTPATIENT)
Dept: ONCOLOGY | Facility: CLINIC | Age: 64
End: 2024-06-10
Payer: MEDICARE

## 2024-06-10 DIAGNOSIS — C20 RECTAL CANCER: Primary | ICD-10-CM

## 2024-06-10 DIAGNOSIS — K62.5 RECTAL BLEED: ICD-10-CM

## 2024-06-10 DIAGNOSIS — K62.89 RECTAL PAIN: ICD-10-CM

## 2024-06-10 RX ORDER — TRAMADOL HYDROCHLORIDE 50 MG/1
50 TABLET ORAL EVERY 6 HOURS PRN
Qty: 180 TABLET | Refills: 0 | Status: SHIPPED | OUTPATIENT
Start: 2024-06-10

## 2024-06-10 NOTE — TELEPHONE ENCOUNTER
"----- Message from Raul Mayberry sent at 6/10/2024 10:19 AM CDT -----  Low tramadol.  ----- Message -----  From: David Doran CMA  Sent: 6/10/2024   9:45 AM CDT  To: Raul Mayberry MD    Per Beltran: If he is really sensitive to standard opioids like Norco and oxy, perhaps trying Tramadol to see if that helps?  I would agree with trying to avoid NSAIDs. If he has to have an Nsaid, maybe Celebrex?  ----- Message -----  From: Raul Mayberry MD  Sent: 6/10/2024   9:22 AM CDT  To: David Doran CMA    Call Beltran for suggestion.  ----- Message -----  From: David Doran CMA  Sent: 6/10/2024   9:15 AM CDT  To: Raul Mayberry MD    Pt can't take this one he said it makes him \"crazy\". Any other suggestions?  ----- Message -----  From: Raul Mayberry MD  Sent: 6/10/2024   9:03 AM CDT  To: David Doran CMA    Norco 5 q 6 hours #100  ----- Message -----  From: David Doran CMA  Sent: 6/10/2024   9:01 AM CDT  To: Raul Mayberry MD    Pt called in stating he was needing something for pain. He has been taking Ibuprofen 800mg TID. He said it helps but he knows it may not be good on his body. Her said  the hydrocodone family of medicines make him \"crazy\" so he cannot take those. Can he continue taking 800mg of Ibuprofen daily TID or what do you suggest?  "

## 2024-06-11 DIAGNOSIS — M19.90 ARTHRITIS: ICD-10-CM

## 2024-06-12 DIAGNOSIS — Z95.828 PORT-A-CATH IN PLACE: ICD-10-CM

## 2024-06-12 DIAGNOSIS — C20 RECTAL CANCER: Primary | ICD-10-CM

## 2024-06-12 RX ORDER — DEXTROSE MONOHYDRATE 50 MG/ML
20 INJECTION, SOLUTION INTRAVENOUS ONCE
OUTPATIENT
Start: 2024-06-18

## 2024-06-12 RX ORDER — DIPHENHYDRAMINE HYDROCHLORIDE 50 MG/ML
50 INJECTION INTRAMUSCULAR; INTRAVENOUS AS NEEDED
OUTPATIENT
Start: 2024-06-18

## 2024-06-12 RX ORDER — FAMOTIDINE 10 MG/ML
20 INJECTION, SOLUTION INTRAVENOUS AS NEEDED
OUTPATIENT
Start: 2024-06-18

## 2024-06-12 RX ORDER — IBUPROFEN 800 MG/1
800 TABLET ORAL
Qty: 90 TABLET | Refills: 1 | Status: SHIPPED | OUTPATIENT
Start: 2024-06-12

## 2024-06-12 RX ORDER — PALONOSETRON 0.05 MG/ML
0.25 INJECTION, SOLUTION INTRAVENOUS ONCE
OUTPATIENT
Start: 2024-06-18

## 2024-06-12 RX ORDER — FLUOROURACIL 50 MG/ML
400 INJECTION, SOLUTION INTRAVENOUS ONCE
OUTPATIENT
Start: 2024-06-18

## 2024-06-18 ENCOUNTER — LAB (OUTPATIENT)
Dept: LAB | Facility: HOSPITAL | Age: 64
End: 2024-06-18
Payer: MEDICARE

## 2024-06-18 ENCOUNTER — INFUSION (OUTPATIENT)
Dept: ONCOLOGY | Facility: HOSPITAL | Age: 64
End: 2024-06-18
Payer: MEDICARE

## 2024-06-18 ENCOUNTER — OFFICE VISIT (OUTPATIENT)
Dept: ONCOLOGY | Facility: CLINIC | Age: 64
End: 2024-06-18
Payer: MEDICARE

## 2024-06-18 VITALS
BODY MASS INDEX: 38.09 KG/M2 | SYSTOLIC BLOOD PRESSURE: 135 MMHG | TEMPERATURE: 97.6 F | HEIGHT: 66 IN | HEART RATE: 90 BPM | RESPIRATION RATE: 18 BRPM | WEIGHT: 237 LBS | DIASTOLIC BLOOD PRESSURE: 56 MMHG | OXYGEN SATURATION: 96 %

## 2024-06-18 VITALS
BODY MASS INDEX: 38.22 KG/M2 | DIASTOLIC BLOOD PRESSURE: 56 MMHG | RESPIRATION RATE: 20 BRPM | OXYGEN SATURATION: 96 % | HEART RATE: 90 BPM | SYSTOLIC BLOOD PRESSURE: 135 MMHG | TEMPERATURE: 97.6 F | WEIGHT: 237.8 LBS | HEIGHT: 66 IN

## 2024-06-18 DIAGNOSIS — Z95.828 PORT-A-CATH IN PLACE: ICD-10-CM

## 2024-06-18 DIAGNOSIS — D64.81 ANEMIA DUE TO ANTINEOPLASTIC CHEMOTHERAPY: ICD-10-CM

## 2024-06-18 DIAGNOSIS — T45.1X5A ANEMIA DUE TO ANTINEOPLASTIC CHEMOTHERAPY: ICD-10-CM

## 2024-06-18 DIAGNOSIS — C20 RECTAL CANCER: Primary | ICD-10-CM

## 2024-06-18 LAB
ALBUMIN SERPL-MCNC: 4 G/DL (ref 3.5–5.2)
ALBUMIN/GLOB SERPL: 1.3 G/DL
ALP SERPL-CCNC: 95 U/L (ref 39–117)
ALT SERPL W P-5'-P-CCNC: 21 U/L (ref 1–41)
ANION GAP SERPL CALCULATED.3IONS-SCNC: 11 MMOL/L (ref 5–15)
AST SERPL-CCNC: 25 U/L (ref 1–40)
BASOPHILS # BLD MANUAL: 0.04 10*3/MM3 (ref 0–0.2)
BASOPHILS NFR BLD MANUAL: 1 % (ref 0–1.5)
BILIRUB SERPL-MCNC: 0.5 MG/DL (ref 0–1.2)
BUN SERPL-MCNC: 14 MG/DL (ref 8–23)
BUN/CREAT SERPL: 14.6 (ref 7–25)
CALCIUM SPEC-SCNC: 9.2 MG/DL (ref 8.6–10.5)
CHLORIDE SERPL-SCNC: 104 MMOL/L (ref 98–107)
CO2 SERPL-SCNC: 24 MMOL/L (ref 22–29)
CREAT SERPL-MCNC: 0.96 MG/DL (ref 0.76–1.27)
DEPRECATED RDW RBC AUTO: 42 FL (ref 37–54)
EGFRCR SERPLBLD CKD-EPI 2021: 87.2 ML/MIN/1.73
EOSINOPHIL # BLD MANUAL: 0 10*3/MM3 (ref 0–0.4)
EOSINOPHIL NFR BLD MANUAL: 0 % (ref 0.3–6.2)
ERYTHROCYTE [DISTWIDTH] IN BLOOD BY AUTOMATED COUNT: 14.4 % (ref 12.3–15.4)
FOLATE SERPL-MCNC: >20 NG/ML (ref 4.78–24.2)
GLOBULIN UR ELPH-MCNC: 3.1 GM/DL
GLUCOSE SERPL-MCNC: 117 MG/DL (ref 65–99)
HCT VFR BLD AUTO: 40.2 % (ref 37.5–51)
HGB BLD-MCNC: 12.6 G/DL (ref 13–17.7)
HOLD SPECIMEN: NORMAL
LYMPHOCYTES # BLD MANUAL: 2.09 10*3/MM3 (ref 0.7–3.1)
LYMPHOCYTES NFR BLD MANUAL: 15 % (ref 5–12)
MAGNESIUM SERPL-MCNC: 2.1 MG/DL (ref 1.6–2.4)
MCH RBC QN AUTO: 25.9 PG (ref 26.6–33)
MCHC RBC AUTO-ENTMCNC: 31.3 G/DL (ref 31.5–35.7)
MCV RBC AUTO: 82.5 FL (ref 79–97)
MONOCYTES # BLD: 0.6 10*3/MM3 (ref 0.1–0.9)
NEUTROPHILS # BLD AUTO: 1.29 10*3/MM3 (ref 1.7–7)
NEUTROPHILS NFR BLD MANUAL: 32 % (ref 42.7–76)
PLAT MORPH BLD: NORMAL
PLATELET # BLD AUTO: 148 10*3/MM3 (ref 140–450)
PMV BLD AUTO: 9.7 FL (ref 6–12)
POTASSIUM SERPL-SCNC: 3.9 MMOL/L (ref 3.5–5.2)
PROT SERPL-MCNC: 7.1 G/DL (ref 6–8.5)
RBC # BLD AUTO: 4.87 10*6/MM3 (ref 4.14–5.8)
RBC MORPH BLD: NORMAL
RETICS # AUTO: 0.1 10*6/MM3 (ref 0.02–0.13)
RETICS/RBC NFR AUTO: 2.02 % (ref 0.7–1.9)
SODIUM SERPL-SCNC: 139 MMOL/L (ref 136–145)
VARIANT LYMPHS NFR BLD MANUAL: 44 % (ref 19.6–45.3)
VARIANT LYMPHS NFR BLD MANUAL: 8 % (ref 0–5)
VIT B12 BLD-MCNC: 1134 PG/ML (ref 211–946)
WBC MORPH BLD: NORMAL
WBC NRBC COR # BLD AUTO: 4.02 10*3/MM3 (ref 3.4–10.8)

## 2024-06-18 PROCEDURE — 99214 OFFICE O/P EST MOD 30 MIN: CPT | Performed by: INTERNAL MEDICINE

## 2024-06-18 PROCEDURE — G0463 HOSPITAL OUTPT CLINIC VISIT: HCPCS

## 2024-06-18 PROCEDURE — 83735 ASSAY OF MAGNESIUM: CPT

## 2024-06-18 PROCEDURE — 85007 BL SMEAR W/DIFF WBC COUNT: CPT

## 2024-06-18 PROCEDURE — 82746 ASSAY OF FOLIC ACID SERUM: CPT

## 2024-06-18 PROCEDURE — 85025 COMPLETE CBC W/AUTO DIFF WBC: CPT

## 2024-06-18 PROCEDURE — 1126F AMNT PAIN NOTED NONE PRSNT: CPT | Performed by: INTERNAL MEDICINE

## 2024-06-18 PROCEDURE — 36415 COLL VENOUS BLD VENIPUNCTURE: CPT

## 2024-06-18 PROCEDURE — 80053 COMPREHEN METABOLIC PANEL: CPT

## 2024-06-18 PROCEDURE — 85045 AUTOMATED RETICULOCYTE COUNT: CPT

## 2024-06-18 PROCEDURE — 82607 VITAMIN B-12: CPT

## 2024-06-18 NOTE — PROGRESS NOTES
Dr. Mayberry came down to see patient and reviewed labs. Per Dr. Mayberry hold treatment one week r/t ANC, pt to receive pegfilgrastim with next tx. Educated pt on neutropenic precautions. Pt verbalized understanding and agreed to plan of care.

## 2024-06-25 ENCOUNTER — LAB (OUTPATIENT)
Dept: LAB | Facility: HOSPITAL | Age: 64
End: 2024-06-25
Payer: MEDICARE

## 2024-06-25 ENCOUNTER — INFUSION (OUTPATIENT)
Dept: ONCOLOGY | Facility: HOSPITAL | Age: 64
End: 2024-06-25
Payer: MEDICARE

## 2024-06-25 VITALS
SYSTOLIC BLOOD PRESSURE: 140 MMHG | BODY MASS INDEX: 38.25 KG/M2 | DIASTOLIC BLOOD PRESSURE: 71 MMHG | RESPIRATION RATE: 16 BRPM | TEMPERATURE: 96.3 F | OXYGEN SATURATION: 97 % | HEIGHT: 66 IN | HEART RATE: 70 BPM | WEIGHT: 238 LBS

## 2024-06-25 DIAGNOSIS — C20 RECTAL CANCER: Primary | ICD-10-CM

## 2024-06-25 DIAGNOSIS — Z95.828 PORT-A-CATH IN PLACE: ICD-10-CM

## 2024-06-25 DIAGNOSIS — C20 RECTAL CANCER: ICD-10-CM

## 2024-06-25 LAB
ALBUMIN SERPL-MCNC: 4.1 G/DL (ref 3.5–5.2)
ALBUMIN/GLOB SERPL: 1.2 G/DL
ALP SERPL-CCNC: 110 U/L (ref 39–117)
ALT SERPL W P-5'-P-CCNC: 20 U/L (ref 1–41)
ANION GAP SERPL CALCULATED.3IONS-SCNC: 8 MMOL/L (ref 5–15)
AST SERPL-CCNC: 25 U/L (ref 1–40)
BASOPHILS # BLD MANUAL: 0.15 10*3/MM3 (ref 0–0.2)
BASOPHILS NFR BLD MANUAL: 2 % (ref 0–1.5)
BILIRUB SERPL-MCNC: 0.3 MG/DL (ref 0–1.2)
BUN SERPL-MCNC: 9 MG/DL (ref 8–23)
BUN/CREAT SERPL: 9.3 (ref 7–25)
CALCIUM SPEC-SCNC: 10 MG/DL (ref 8.6–10.5)
CHLORIDE SERPL-SCNC: 99 MMOL/L (ref 98–107)
CO2 SERPL-SCNC: 28 MMOL/L (ref 22–29)
CREAT SERPL-MCNC: 0.97 MG/DL (ref 0.76–1.27)
DEPRECATED RDW RBC AUTO: 42.1 FL (ref 37–54)
EGFRCR SERPLBLD CKD-EPI 2021: 86.1 ML/MIN/1.73
EOSINOPHIL # BLD MANUAL: 0 10*3/MM3 (ref 0–0.4)
EOSINOPHIL NFR BLD MANUAL: 0 % (ref 0.3–6.2)
ERYTHROCYTE [DISTWIDTH] IN BLOOD BY AUTOMATED COUNT: 14.7 % (ref 12.3–15.4)
GLOBULIN UR ELPH-MCNC: 3.5 GM/DL
GLUCOSE SERPL-MCNC: 102 MG/DL (ref 65–99)
HCT VFR BLD AUTO: 41.4 % (ref 37.5–51)
HGB BLD-MCNC: 12.8 G/DL (ref 13–17.7)
LYMPHOCYTES # BLD MANUAL: 2.71 10*3/MM3 (ref 0.7–3.1)
LYMPHOCYTES NFR BLD MANUAL: 12 % (ref 5–12)
MAGNESIUM SERPL-MCNC: 2.1 MG/DL (ref 1.6–2.4)
MCH RBC QN AUTO: 25.4 PG (ref 26.6–33)
MCHC RBC AUTO-ENTMCNC: 30.9 G/DL (ref 31.5–35.7)
MCV RBC AUTO: 82.1 FL (ref 79–97)
MONOCYTES # BLD: 0.88 10*3/MM3 (ref 0.1–0.9)
MYELOCYTES NFR BLD MANUAL: 3 % (ref 0–0)
NEUTROPHILS # BLD AUTO: 3.37 10*3/MM3 (ref 1.7–7)
NEUTROPHILS NFR BLD MANUAL: 44 % (ref 42.7–76)
NEUTS BAND NFR BLD MANUAL: 2 % (ref 0–5)
PLAT MORPH BLD: NORMAL
PLATELET # BLD AUTO: 246 10*3/MM3 (ref 140–450)
PMV BLD AUTO: 9.6 FL (ref 6–12)
POTASSIUM SERPL-SCNC: 4.4 MMOL/L (ref 3.5–5.2)
PROT SERPL-MCNC: 7.6 G/DL (ref 6–8.5)
RBC # BLD AUTO: 5.04 10*6/MM3 (ref 4.14–5.8)
RBC MORPH BLD: NORMAL
SODIUM SERPL-SCNC: 135 MMOL/L (ref 136–145)
VARIANT LYMPHS NFR BLD MANUAL: 11 % (ref 0–5)
VARIANT LYMPHS NFR BLD MANUAL: 26 % (ref 19.6–45.3)
WBC MORPH BLD: NORMAL
WBC NRBC COR # BLD AUTO: 7.32 10*3/MM3 (ref 3.4–10.8)

## 2024-06-25 PROCEDURE — 85025 COMPLETE CBC W/AUTO DIFF WBC: CPT

## 2024-06-25 PROCEDURE — 96375 TX/PRO/DX INJ NEW DRUG ADDON: CPT

## 2024-06-25 PROCEDURE — 96415 CHEMO IV INFUSION ADDL HR: CPT

## 2024-06-25 PROCEDURE — 25010000002 FLUOROURACIL PER 500 MG: Performed by: INTERNAL MEDICINE

## 2024-06-25 PROCEDURE — 25010000002 PALONOSETRON PER 25 MCG: Performed by: INTERNAL MEDICINE

## 2024-06-25 PROCEDURE — 25010000002 LEUCOVORIN CALCIUM PER 50 MG: Performed by: INTERNAL MEDICINE

## 2024-06-25 PROCEDURE — 96366 THER/PROPH/DIAG IV INF ADDON: CPT

## 2024-06-25 PROCEDURE — 0 DEXTROSE 5 % SOLUTION 250 ML FLEX CONT: Performed by: INTERNAL MEDICINE

## 2024-06-25 PROCEDURE — 80053 COMPREHEN METABOLIC PANEL: CPT

## 2024-06-25 PROCEDURE — 0 DEXTROSE 5 % SOLUTION: Performed by: INTERNAL MEDICINE

## 2024-06-25 PROCEDURE — G0498 CHEMO EXTEND IV INFUS W/PUMP: HCPCS

## 2024-06-25 PROCEDURE — 25010000002 LEUCOVORIN 200 MG RECONSTITUTED SOLUTION 200 MG VIAL: Performed by: INTERNAL MEDICINE

## 2024-06-25 PROCEDURE — 96411 CHEMO IV PUSH ADDL DRUG: CPT

## 2024-06-25 PROCEDURE — 96367 TX/PROPH/DG ADDL SEQ IV INF: CPT

## 2024-06-25 PROCEDURE — 25010000002 FOSAPREPITANT PER 1 MG: Performed by: INTERNAL MEDICINE

## 2024-06-25 PROCEDURE — 85007 BL SMEAR W/DIFF WBC COUNT: CPT

## 2024-06-25 PROCEDURE — 96368 THER/DIAG CONCURRENT INF: CPT

## 2024-06-25 PROCEDURE — 36415 COLL VENOUS BLD VENIPUNCTURE: CPT

## 2024-06-25 PROCEDURE — 83735 ASSAY OF MAGNESIUM: CPT

## 2024-06-25 PROCEDURE — 25010000002 DEXAMETHASONE SODIUM PHOSPHATE 100 MG/10ML SOLUTION: Performed by: INTERNAL MEDICINE

## 2024-06-25 PROCEDURE — 96413 CHEMO IV INFUSION 1 HR: CPT

## 2024-06-25 PROCEDURE — 25010000002 OXALIPLATIN PER 0.5 MG: Performed by: INTERNAL MEDICINE

## 2024-06-25 RX ORDER — DIPHENHYDRAMINE HYDROCHLORIDE 50 MG/ML
50 INJECTION INTRAMUSCULAR; INTRAVENOUS AS NEEDED
Status: DISCONTINUED | OUTPATIENT
Start: 2024-06-25 | End: 2024-06-25 | Stop reason: HOSPADM

## 2024-06-25 RX ORDER — SODIUM CHLORIDE 0.9 % (FLUSH) 0.9 %
10 SYRINGE (ML) INJECTION AS NEEDED
Status: DISCONTINUED | OUTPATIENT
Start: 2024-06-25 | End: 2024-06-25 | Stop reason: HOSPADM

## 2024-06-25 RX ORDER — DEXTROSE MONOHYDRATE 50 MG/ML
20 INJECTION, SOLUTION INTRAVENOUS ONCE
Status: COMPLETED | OUTPATIENT
Start: 2024-06-25 | End: 2024-06-25

## 2024-06-25 RX ORDER — HEPARIN SODIUM (PORCINE) LOCK FLUSH IV SOLN 100 UNIT/ML 100 UNIT/ML
500 SOLUTION INTRAVENOUS AS NEEDED
Status: DISCONTINUED | OUTPATIENT
Start: 2024-06-25 | End: 2024-06-25 | Stop reason: HOSPADM

## 2024-06-25 RX ORDER — HEPARIN SODIUM (PORCINE) LOCK FLUSH IV SOLN 100 UNIT/ML 100 UNIT/ML
500 SOLUTION INTRAVENOUS AS NEEDED
Status: CANCELLED | OUTPATIENT
Start: 2024-06-25

## 2024-06-25 RX ORDER — PALONOSETRON 0.05 MG/ML
0.25 INJECTION, SOLUTION INTRAVENOUS ONCE
Status: COMPLETED | OUTPATIENT
Start: 2024-06-25 | End: 2024-06-25

## 2024-06-25 RX ORDER — SODIUM CHLORIDE 0.9 % (FLUSH) 0.9 %
10 SYRINGE (ML) INJECTION AS NEEDED
Status: CANCELLED | OUTPATIENT
Start: 2024-06-25

## 2024-06-25 RX ORDER — FAMOTIDINE 10 MG/ML
20 INJECTION, SOLUTION INTRAVENOUS AS NEEDED
Status: DISCONTINUED | OUTPATIENT
Start: 2024-06-25 | End: 2024-06-25 | Stop reason: HOSPADM

## 2024-06-25 RX ORDER — FLUOROURACIL 50 MG/ML
400 INJECTION, SOLUTION INTRAVENOUS ONCE
Status: COMPLETED | OUTPATIENT
Start: 2024-06-25 | End: 2024-06-25

## 2024-06-25 RX ADMIN — FOSAPREPITANT 150 MG: 150 INJECTION, POWDER, LYOPHILIZED, FOR SOLUTION INTRAVENOUS at 09:11

## 2024-06-25 RX ADMIN — DEXAMETHASONE SODIUM PHOSPHATE 12 MG: 10 INJECTION, SOLUTION INTRAMUSCULAR; INTRAVENOUS at 08:52

## 2024-06-25 RX ADMIN — DEXTROSE MONOHYDRATE 20 ML/HR: 50 INJECTION, SOLUTION INTRAVENOUS at 08:50

## 2024-06-25 RX ADMIN — LEUCOVORIN CALCIUM 860 MG: 350 INJECTION, POWDER, LYOPHILIZED, FOR SUSPENSION INTRAMUSCULAR; INTRAVENOUS at 09:42

## 2024-06-25 RX ADMIN — FLUOROURACIL 860 MG: 50 INJECTION, SOLUTION INTRAVENOUS at 12:01

## 2024-06-25 RX ADMIN — FLUOROURACIL 5160 MG: 50 INJECTION, SOLUTION INTRAVENOUS at 12:08

## 2024-06-25 RX ADMIN — OXALIPLATIN 185 MG: 5 INJECTION, SOLUTION INTRAVENOUS at 09:42

## 2024-06-25 RX ADMIN — PALONOSETRON HYDROCHLORIDE 0.25 MG: 0.25 INJECTION, SOLUTION INTRAVENOUS at 08:50

## 2024-06-27 ENCOUNTER — INFUSION (OUTPATIENT)
Dept: ONCOLOGY | Facility: HOSPITAL | Age: 64
End: 2024-06-27
Payer: MEDICARE

## 2024-06-27 VITALS
TEMPERATURE: 97.7 F | SYSTOLIC BLOOD PRESSURE: 136 MMHG | DIASTOLIC BLOOD PRESSURE: 65 MMHG | HEART RATE: 68 BPM | RESPIRATION RATE: 18 BRPM | OXYGEN SATURATION: 96 %

## 2024-06-27 DIAGNOSIS — Z95.828 PORT-A-CATH IN PLACE: ICD-10-CM

## 2024-06-27 DIAGNOSIS — C20 RECTAL CANCER: Primary | ICD-10-CM

## 2024-06-27 PROCEDURE — 25010000002 HEPARIN LOCK FLUSH PER 10 UNITS: Performed by: INTERNAL MEDICINE

## 2024-06-27 RX ORDER — HEPARIN SODIUM (PORCINE) LOCK FLUSH IV SOLN 100 UNIT/ML 100 UNIT/ML
500 SOLUTION INTRAVENOUS AS NEEDED
Status: DISCONTINUED | OUTPATIENT
Start: 2024-06-27 | End: 2024-06-27 | Stop reason: HOSPADM

## 2024-06-27 RX ORDER — HEPARIN SODIUM (PORCINE) LOCK FLUSH IV SOLN 100 UNIT/ML 100 UNIT/ML
500 SOLUTION INTRAVENOUS AS NEEDED
OUTPATIENT
Start: 2024-06-27

## 2024-06-27 RX ORDER — SODIUM CHLORIDE 0.9 % (FLUSH) 0.9 %
10 SYRINGE (ML) INJECTION AS NEEDED
Status: DISCONTINUED | OUTPATIENT
Start: 2024-06-27 | End: 2024-06-27 | Stop reason: HOSPADM

## 2024-06-27 RX ORDER — SODIUM CHLORIDE 0.9 % (FLUSH) 0.9 %
10 SYRINGE (ML) INJECTION AS NEEDED
OUTPATIENT
Start: 2024-06-27

## 2024-06-27 RX ADMIN — HEPARIN 500 UNITS: 100 SYRINGE at 08:54

## 2024-06-27 RX ADMIN — Medication 10 ML: at 08:54

## 2024-07-03 DIAGNOSIS — Z95.828 PORT-A-CATH IN PLACE: ICD-10-CM

## 2024-07-03 DIAGNOSIS — C20 RECTAL CANCER: Primary | ICD-10-CM

## 2024-07-03 RX ORDER — FAMOTIDINE 10 MG/ML
20 INJECTION, SOLUTION INTRAVENOUS AS NEEDED
OUTPATIENT
Start: 2024-07-09

## 2024-07-03 RX ORDER — DIPHENHYDRAMINE HYDROCHLORIDE 50 MG/ML
50 INJECTION INTRAMUSCULAR; INTRAVENOUS AS NEEDED
OUTPATIENT
Start: 2024-07-09

## 2024-07-03 RX ORDER — FLUOROURACIL 50 MG/ML
400 INJECTION, SOLUTION INTRAVENOUS ONCE
OUTPATIENT
Start: 2024-07-09

## 2024-07-03 RX ORDER — PALONOSETRON 0.05 MG/ML
0.25 INJECTION, SOLUTION INTRAVENOUS ONCE
OUTPATIENT
Start: 2024-07-09

## 2024-07-03 RX ORDER — DEXTROSE MONOHYDRATE 50 MG/ML
20 INJECTION, SOLUTION INTRAVENOUS ONCE
OUTPATIENT
Start: 2024-07-09

## 2024-07-09 ENCOUNTER — LAB (OUTPATIENT)
Dept: LAB | Facility: HOSPITAL | Age: 64
End: 2024-07-09
Payer: MEDICARE

## 2024-07-09 ENCOUNTER — INFUSION (OUTPATIENT)
Dept: ONCOLOGY | Facility: HOSPITAL | Age: 64
End: 2024-07-09
Payer: MEDICARE

## 2024-07-09 VITALS
OXYGEN SATURATION: 99 % | DIASTOLIC BLOOD PRESSURE: 67 MMHG | HEIGHT: 66 IN | BODY MASS INDEX: 38.07 KG/M2 | HEART RATE: 74 BPM | WEIGHT: 236.87 LBS | RESPIRATION RATE: 17 BRPM | SYSTOLIC BLOOD PRESSURE: 138 MMHG | TEMPERATURE: 97.5 F

## 2024-07-09 DIAGNOSIS — C20 RECTAL CANCER: ICD-10-CM

## 2024-07-09 DIAGNOSIS — C20 RECTAL CANCER: Primary | ICD-10-CM

## 2024-07-09 DIAGNOSIS — Z95.828 PORT-A-CATH IN PLACE: ICD-10-CM

## 2024-07-09 LAB
ALBUMIN SERPL-MCNC: 3.9 G/DL (ref 3.5–5.2)
ALBUMIN/GLOB SERPL: 1.2 G/DL
ALP SERPL-CCNC: 96 U/L (ref 39–117)
ALT SERPL W P-5'-P-CCNC: 24 U/L (ref 1–41)
ANION GAP SERPL CALCULATED.3IONS-SCNC: 9 MMOL/L (ref 5–15)
AST SERPL-CCNC: 29 U/L (ref 1–40)
BASOPHILS # BLD MANUAL: 0.05 10*3/MM3 (ref 0–0.2)
BASOPHILS NFR BLD MANUAL: 1 % (ref 0–1.5)
BILIRUB SERPL-MCNC: 0.4 MG/DL (ref 0–1.2)
BUN SERPL-MCNC: 9 MG/DL (ref 8–23)
BUN/CREAT SERPL: 10.1 (ref 7–25)
CALCIUM SPEC-SCNC: 9 MG/DL (ref 8.6–10.5)
CHLORIDE SERPL-SCNC: 105 MMOL/L (ref 98–107)
CO2 SERPL-SCNC: 25 MMOL/L (ref 22–29)
CREAT SERPL-MCNC: 0.89 MG/DL (ref 0.76–1.27)
DEPRECATED RDW RBC AUTO: 44.4 FL (ref 37–54)
EGFRCR SERPLBLD CKD-EPI 2021: 94.5 ML/MIN/1.73
EOSINOPHIL # BLD MANUAL: 0 10*3/MM3 (ref 0–0.4)
EOSINOPHIL NFR BLD MANUAL: 0 % (ref 0.3–6.2)
ERYTHROCYTE [DISTWIDTH] IN BLOOD BY AUTOMATED COUNT: 15.3 % (ref 12.3–15.4)
GLOBULIN UR ELPH-MCNC: 3.2 GM/DL
GLUCOSE SERPL-MCNC: 102 MG/DL (ref 65–99)
HCT VFR BLD AUTO: 38.1 % (ref 37.5–51)
HGB BLD-MCNC: 12.4 G/DL (ref 13–17.7)
LYMPHOCYTES # BLD MANUAL: 1.58 10*3/MM3 (ref 0.7–3.1)
LYMPHOCYTES NFR BLD MANUAL: 26.5 % (ref 5–12)
MAGNESIUM SERPL-MCNC: 2 MG/DL (ref 1.6–2.4)
MCH RBC QN AUTO: 26.3 PG (ref 26.6–33)
MCHC RBC AUTO-ENTMCNC: 32.5 G/DL (ref 31.5–35.7)
MCV RBC AUTO: 80.9 FL (ref 79–97)
MONOCYTES # BLD: 1.33 10*3/MM3 (ref 0.1–0.9)
NEUTROPHILS # BLD AUTO: 2.04 10*3/MM3 (ref 1.7–7)
NEUTROPHILS NFR BLD MANUAL: 40.8 % (ref 42.7–76)
PLATELET # BLD AUTO: 118 10*3/MM3 (ref 140–450)
PMV BLD AUTO: 9.4 FL (ref 6–12)
POTASSIUM SERPL-SCNC: 4 MMOL/L (ref 3.5–5.2)
PROT SERPL-MCNC: 7.1 G/DL (ref 6–8.5)
RBC # BLD AUTO: 4.71 10*6/MM3 (ref 4.14–5.8)
ROULEAUX BLD QL SMEAR: ABNORMAL
SMALL PLATELETS BLD QL SMEAR: ABNORMAL
SODIUM SERPL-SCNC: 139 MMOL/L (ref 136–145)
VARIANT LYMPHS NFR BLD MANUAL: 10.2 % (ref 0–5)
VARIANT LYMPHS NFR BLD MANUAL: 21.4 % (ref 19.6–45.3)
WBC MORPH BLD: NORMAL
WBC NRBC COR # BLD AUTO: 5 10*3/MM3 (ref 3.4–10.8)

## 2024-07-09 PROCEDURE — 96375 TX/PRO/DX INJ NEW DRUG ADDON: CPT

## 2024-07-09 PROCEDURE — 25010000002 LEUCOVORIN CALCIUM PER 50 MG: Performed by: INTERNAL MEDICINE

## 2024-07-09 PROCEDURE — 96367 TX/PROPH/DG ADDL SEQ IV INF: CPT

## 2024-07-09 PROCEDURE — G0498 CHEMO EXTEND IV INFUS W/PUMP: HCPCS

## 2024-07-09 PROCEDURE — 96366 THER/PROPH/DIAG IV INF ADDON: CPT

## 2024-07-09 PROCEDURE — 36415 COLL VENOUS BLD VENIPUNCTURE: CPT

## 2024-07-09 PROCEDURE — 80053 COMPREHEN METABOLIC PANEL: CPT

## 2024-07-09 PROCEDURE — 96368 THER/DIAG CONCURRENT INF: CPT

## 2024-07-09 PROCEDURE — 25010000002 PALONOSETRON PER 25 MCG: Performed by: INTERNAL MEDICINE

## 2024-07-09 PROCEDURE — 85025 COMPLETE CBC W/AUTO DIFF WBC: CPT

## 2024-07-09 PROCEDURE — 25010000002 LEUCOVORIN 200 MG RECONSTITUTED SOLUTION 200 MG VIAL: Performed by: INTERNAL MEDICINE

## 2024-07-09 PROCEDURE — 85007 BL SMEAR W/DIFF WBC COUNT: CPT

## 2024-07-09 PROCEDURE — 25010000002 FOSAPREPITANT PER 1 MG: Performed by: INTERNAL MEDICINE

## 2024-07-09 PROCEDURE — 0 DEXTROSE 5 % SOLUTION 250 ML FLEX CONT: Performed by: INTERNAL MEDICINE

## 2024-07-09 PROCEDURE — 25010000002 FLUOROURACIL PER 500 MG: Performed by: INTERNAL MEDICINE

## 2024-07-09 PROCEDURE — 96415 CHEMO IV INFUSION ADDL HR: CPT

## 2024-07-09 PROCEDURE — 25010000002 OXALIPLATIN PER 0.5 MG: Performed by: INTERNAL MEDICINE

## 2024-07-09 PROCEDURE — 83735 ASSAY OF MAGNESIUM: CPT

## 2024-07-09 PROCEDURE — 0 DEXTROSE 5 % SOLUTION: Performed by: INTERNAL MEDICINE

## 2024-07-09 PROCEDURE — 25010000002 DEXAMETHASONE SODIUM PHOSPHATE 100 MG/10ML SOLUTION: Performed by: INTERNAL MEDICINE

## 2024-07-09 PROCEDURE — 96413 CHEMO IV INFUSION 1 HR: CPT

## 2024-07-09 PROCEDURE — 96411 CHEMO IV PUSH ADDL DRUG: CPT

## 2024-07-09 RX ORDER — SODIUM CHLORIDE 0.9 % (FLUSH) 0.9 %
10 SYRINGE (ML) INJECTION AS NEEDED
Status: CANCELLED | OUTPATIENT
Start: 2024-07-09

## 2024-07-09 RX ORDER — PALONOSETRON 0.05 MG/ML
0.25 INJECTION, SOLUTION INTRAVENOUS ONCE
Status: COMPLETED | OUTPATIENT
Start: 2024-07-09 | End: 2024-07-09

## 2024-07-09 RX ORDER — FLUOROURACIL 50 MG/ML
400 INJECTION, SOLUTION INTRAVENOUS ONCE
Status: COMPLETED | OUTPATIENT
Start: 2024-07-09 | End: 2024-07-09

## 2024-07-09 RX ORDER — HEPARIN SODIUM (PORCINE) LOCK FLUSH IV SOLN 100 UNIT/ML 100 UNIT/ML
500 SOLUTION INTRAVENOUS AS NEEDED
Status: CANCELLED | OUTPATIENT
Start: 2024-07-09

## 2024-07-09 RX ORDER — HEPARIN SODIUM (PORCINE) LOCK FLUSH IV SOLN 100 UNIT/ML 100 UNIT/ML
500 SOLUTION INTRAVENOUS AS NEEDED
Status: DISCONTINUED | OUTPATIENT
Start: 2024-07-09 | End: 2024-07-09 | Stop reason: HOSPADM

## 2024-07-09 RX ORDER — DEXTROSE MONOHYDRATE 50 MG/ML
20 INJECTION, SOLUTION INTRAVENOUS ONCE
Status: COMPLETED | OUTPATIENT
Start: 2024-07-09 | End: 2024-07-09

## 2024-07-09 RX ORDER — FAMOTIDINE 10 MG/ML
20 INJECTION, SOLUTION INTRAVENOUS AS NEEDED
Status: DISCONTINUED | OUTPATIENT
Start: 2024-07-09 | End: 2024-07-09 | Stop reason: HOSPADM

## 2024-07-09 RX ORDER — DIPHENHYDRAMINE HYDROCHLORIDE 50 MG/ML
50 INJECTION INTRAMUSCULAR; INTRAVENOUS AS NEEDED
Status: DISCONTINUED | OUTPATIENT
Start: 2024-07-09 | End: 2024-07-09 | Stop reason: HOSPADM

## 2024-07-09 RX ORDER — SODIUM CHLORIDE 0.9 % (FLUSH) 0.9 %
10 SYRINGE (ML) INJECTION AS NEEDED
Status: DISCONTINUED | OUTPATIENT
Start: 2024-07-09 | End: 2024-07-09 | Stop reason: HOSPADM

## 2024-07-09 RX ADMIN — DEXTROSE MONOHYDRATE 20 ML/HR: 50 INJECTION, SOLUTION INTRAVENOUS at 09:15

## 2024-07-09 RX ADMIN — FLUOROURACIL 5160 MG: 50 INJECTION, SOLUTION INTRAVENOUS at 12:45

## 2024-07-09 RX ADMIN — FOSAPREPITANT 150 MG: 150 INJECTION, POWDER, LYOPHILIZED, FOR SOLUTION INTRAVENOUS at 09:35

## 2024-07-09 RX ADMIN — LEUCOVORIN CALCIUM 860 MG: 350 INJECTION, POWDER, LYOPHILIZED, FOR SUSPENSION INTRAMUSCULAR; INTRAVENOUS at 10:15

## 2024-07-09 RX ADMIN — DEXAMETHASONE SODIUM PHOSPHATE 12 MG: 10 INJECTION, SOLUTION INTRAMUSCULAR; INTRAVENOUS at 09:17

## 2024-07-09 RX ADMIN — FLUOROURACIL 860 MG: 50 INJECTION, SOLUTION INTRAVENOUS at 12:39

## 2024-07-09 RX ADMIN — PALONOSETRON HYDROCHLORIDE 0.25 MG: 0.25 INJECTION, SOLUTION INTRAVENOUS at 09:15

## 2024-07-09 RX ADMIN — OXALIPLATIN 185 MG: 5 INJECTION, SOLUTION INTRAVENOUS at 10:15

## 2024-07-11 ENCOUNTER — INFUSION (OUTPATIENT)
Dept: ONCOLOGY | Facility: HOSPITAL | Age: 64
End: 2024-07-11
Payer: MEDICARE

## 2024-07-11 VITALS
DIASTOLIC BLOOD PRESSURE: 59 MMHG | SYSTOLIC BLOOD PRESSURE: 116 MMHG | TEMPERATURE: 97.4 F | RESPIRATION RATE: 18 BRPM | OXYGEN SATURATION: 97 % | HEART RATE: 77 BPM

## 2024-07-11 DIAGNOSIS — Z95.828 PORT-A-CATH IN PLACE: ICD-10-CM

## 2024-07-11 DIAGNOSIS — C20 RECTAL CANCER: Primary | ICD-10-CM

## 2024-07-11 PROCEDURE — 25010000002 HEPARIN LOCK FLUSH PER 10 UNITS: Performed by: INTERNAL MEDICINE

## 2024-07-11 PROCEDURE — 25010000002 PEGFILGRASTIM 6 MG/0.6ML PREFILLED SYRINGE KIT: Performed by: INTERNAL MEDICINE

## 2024-07-11 PROCEDURE — 96377 APPLICATON ON-BODY INJECTOR: CPT

## 2024-07-11 RX ORDER — SODIUM CHLORIDE 0.9 % (FLUSH) 0.9 %
10 SYRINGE (ML) INJECTION AS NEEDED
Status: DISCONTINUED | OUTPATIENT
Start: 2024-07-11 | End: 2024-07-11 | Stop reason: HOSPADM

## 2024-07-11 RX ORDER — HEPARIN SODIUM (PORCINE) LOCK FLUSH IV SOLN 100 UNIT/ML 100 UNIT/ML
500 SOLUTION INTRAVENOUS AS NEEDED
Status: DISCONTINUED | OUTPATIENT
Start: 2024-07-11 | End: 2024-07-11 | Stop reason: HOSPADM

## 2024-07-11 RX ORDER — SODIUM CHLORIDE 0.9 % (FLUSH) 0.9 %
10 SYRINGE (ML) INJECTION AS NEEDED
OUTPATIENT
Start: 2024-07-11

## 2024-07-11 RX ORDER — HEPARIN SODIUM (PORCINE) LOCK FLUSH IV SOLN 100 UNIT/ML 100 UNIT/ML
500 SOLUTION INTRAVENOUS AS NEEDED
OUTPATIENT
Start: 2024-07-11

## 2024-07-11 RX ADMIN — Medication 500 UNITS: at 10:23

## 2024-07-11 RX ADMIN — Medication 10 ML: at 10:23

## 2024-07-11 RX ADMIN — PEGFILGRASTIM 6 MG: KIT SUBCUTANEOUS at 10:30

## 2024-07-16 ENCOUNTER — LAB (OUTPATIENT)
Dept: LAB | Facility: HOSPITAL | Age: 64
End: 2024-07-16
Payer: MEDICARE

## 2024-07-16 ENCOUNTER — TELEPHONE (OUTPATIENT)
Dept: ONCOLOGY | Facility: CLINIC | Age: 64
End: 2024-07-16

## 2024-07-16 ENCOUNTER — OFFICE VISIT (OUTPATIENT)
Dept: ONCOLOGY | Facility: CLINIC | Age: 64
End: 2024-07-16
Payer: MEDICARE

## 2024-07-16 VITALS
BODY MASS INDEX: 37.28 KG/M2 | DIASTOLIC BLOOD PRESSURE: 76 MMHG | RESPIRATION RATE: 18 BRPM | HEART RATE: 95 BPM | OXYGEN SATURATION: 97 % | HEIGHT: 66 IN | TEMPERATURE: 97.7 F | WEIGHT: 232 LBS | SYSTOLIC BLOOD PRESSURE: 138 MMHG

## 2024-07-16 DIAGNOSIS — C20 RECTAL CANCER: Primary | ICD-10-CM

## 2024-07-16 DIAGNOSIS — Z95.828 PORT-A-CATH IN PLACE: ICD-10-CM

## 2024-07-16 LAB
ALBUMIN SERPL-MCNC: 4 G/DL (ref 3.5–5.2)
ALBUMIN/GLOB SERPL: 1.1 G/DL
ALP SERPL-CCNC: 444 U/L (ref 39–117)
ALT SERPL W P-5'-P-CCNC: 29 U/L (ref 1–41)
ANION GAP SERPL CALCULATED.3IONS-SCNC: 10 MMOL/L (ref 5–15)
AST SERPL-CCNC: 48 U/L (ref 1–40)
BILIRUB SERPL-MCNC: 0.2 MG/DL (ref 0–1.2)
BUN SERPL-MCNC: 12 MG/DL (ref 8–23)
BUN/CREAT SERPL: 11.5 (ref 7–25)
CALCIUM SPEC-SCNC: 9 MG/DL (ref 8.6–10.5)
CHLORIDE SERPL-SCNC: 105 MMOL/L (ref 98–107)
CO2 SERPL-SCNC: 26 MMOL/L (ref 22–29)
CREAT SERPL-MCNC: 1.04 MG/DL (ref 0.76–1.27)
DEPRECATED RDW RBC AUTO: 45.3 FL (ref 37–54)
EGFRCR SERPLBLD CKD-EPI 2021: 79.2 ML/MIN/1.73
ERYTHROCYTE [DISTWIDTH] IN BLOOD BY AUTOMATED COUNT: 15.7 % (ref 12.3–15.4)
FERRITIN SERPL-MCNC: 1735 NG/ML (ref 30–400)
GLOBULIN UR ELPH-MCNC: 3.5 GM/DL
GLUCOSE SERPL-MCNC: 123 MG/DL (ref 65–99)
HCT VFR BLD AUTO: 38.1 % (ref 37.5–51)
HGB BLD-MCNC: 12.1 G/DL (ref 13–17.7)
HOLD SPECIMEN: NORMAL
IRON 24H UR-MRATE: 29 MCG/DL (ref 59–158)
IRON SATN MFR SERPL: 9 % (ref 20–50)
LYMPHOCYTES # BLD MANUAL: 3.48 10*3/MM3 (ref 0.7–3.1)
LYMPHOCYTES NFR BLD MANUAL: 7 % (ref 5–12)
MAGNESIUM SERPL-MCNC: 2.1 MG/DL (ref 1.6–2.4)
MCH RBC QN AUTO: 26.1 PG (ref 26.6–33)
MCHC RBC AUTO-ENTMCNC: 31.8 G/DL (ref 31.5–35.7)
MCV RBC AUTO: 82.3 FL (ref 79–97)
MONOCYTES # BLD: 3.48 10*3/MM3 (ref 0.1–0.9)
NEUTROPHILS # BLD AUTO: 42.72 10*3/MM3 (ref 1.7–7)
NEUTROPHILS NFR BLD MANUAL: 82 % (ref 42.7–76)
NEUTS BAND NFR BLD MANUAL: 4 % (ref 0–5)
PLATELET # BLD AUTO: 108 10*3/MM3 (ref 140–450)
PMV BLD AUTO: 10.1 FL (ref 6–12)
POTASSIUM SERPL-SCNC: 4.4 MMOL/L (ref 3.5–5.2)
PROT SERPL-MCNC: 7.5 G/DL (ref 6–8.5)
RBC # BLD AUTO: 4.63 10*6/MM3 (ref 4.14–5.8)
RBC MORPH BLD: NORMAL
SMALL PLATELETS BLD QL SMEAR: ABNORMAL
SODIUM SERPL-SCNC: 141 MMOL/L (ref 136–145)
TIBC SERPL-MCNC: 335 MCG/DL (ref 298–536)
TRANSFERRIN SERPL-MCNC: 225 MG/DL (ref 200–360)
VARIANT LYMPHS NFR BLD MANUAL: 7 % (ref 19.6–45.3)
WBC MORPH BLD: NORMAL
WBC NRBC COR # BLD AUTO: 49.68 10*3/MM3 (ref 3.4–10.8)

## 2024-07-16 PROCEDURE — 1126F AMNT PAIN NOTED NONE PRSNT: CPT | Performed by: INTERNAL MEDICINE

## 2024-07-16 PROCEDURE — 1159F MED LIST DOCD IN RCRD: CPT | Performed by: INTERNAL MEDICINE

## 2024-07-16 PROCEDURE — 85007 BL SMEAR W/DIFF WBC COUNT: CPT

## 2024-07-16 PROCEDURE — 36415 COLL VENOUS BLD VENIPUNCTURE: CPT

## 2024-07-16 PROCEDURE — 83735 ASSAY OF MAGNESIUM: CPT

## 2024-07-16 PROCEDURE — 1160F RVW MEDS BY RX/DR IN RCRD: CPT | Performed by: INTERNAL MEDICINE

## 2024-07-16 PROCEDURE — 80053 COMPREHEN METABOLIC PANEL: CPT

## 2024-07-16 PROCEDURE — 83540 ASSAY OF IRON: CPT

## 2024-07-16 PROCEDURE — 99215 OFFICE O/P EST HI 40 MIN: CPT | Performed by: INTERNAL MEDICINE

## 2024-07-16 PROCEDURE — 82728 ASSAY OF FERRITIN: CPT

## 2024-07-16 PROCEDURE — 85025 COMPLETE CBC W/AUTO DIFF WBC: CPT

## 2024-07-16 PROCEDURE — 84466 ASSAY OF TRANSFERRIN: CPT

## 2024-07-16 RX ORDER — FAMOTIDINE 10 MG/ML
20 INJECTION, SOLUTION INTRAVENOUS AS NEEDED
OUTPATIENT
Start: 2024-07-30

## 2024-07-16 RX ORDER — DEXTROSE MONOHYDRATE 50 MG/ML
20 INJECTION, SOLUTION INTRAVENOUS ONCE
OUTPATIENT
Start: 2024-07-30

## 2024-07-16 RX ORDER — DIPHENHYDRAMINE HYDROCHLORIDE 50 MG/ML
50 INJECTION INTRAMUSCULAR; INTRAVENOUS AS NEEDED
OUTPATIENT
Start: 2024-07-30

## 2024-07-16 RX ORDER — PALONOSETRON 0.05 MG/ML
0.25 INJECTION, SOLUTION INTRAVENOUS ONCE
OUTPATIENT
Start: 2024-07-30

## 2024-07-16 RX ORDER — FLUOROURACIL 50 MG/ML
400 INJECTION, SOLUTION INTRAVENOUS ONCE
OUTPATIENT
Start: 2024-07-30

## 2024-07-16 NOTE — PROGRESS NOTES
[facility] HEMATOLOGY & ONCOLOGY  [address]  [facility] [address] [phone]    Patient Name: Antwan Stevenson  Encounter Date: 07/16/2024  YOB: 1960  Patient Number: [id number]      REASON FOR FOLLOW-UP: Antwan Stevenson is a pleasant 64 y.o.  male who is seen on follow-up for adenocarcinoma the rectum.  He is seen C4D8 of neoadjuvant FOLFOX.    He is seen alone. History is obtained from patient.  He is a reliable historian.        DIAGNOSTIC ABNORMALITIES:   He presented with hematochezia.  Patient seen by Jose Guadalupe Diez, APRN 3/20/2024.  He reports frequent rectal bleeding.  Patient scheduled for colonoscopy.  CT abdomen pelvis 4/11/2024.Bilateral nephrolithiasis without obstructive uropathy.   Cholelithiasis.   Left pelvic spigelian hernia containing a portion of sigmoid colon.  No bowel obstruction or inflammation. Fatty supraumbilical and bilateral inguinal hernias present as well.  Evidence of prior umbilical hernia repair.  Bibasilar pulmonary micronodules measuring up to 0.4 cm.  Chest CT follow-up in 12 months recommended unless shorter interval follow-up is warranted by history.   Colonoscopy 4/17/2024.  Anal mass. The colon was normal from the rectum to the mid transverse, as I had to use an endoscope due to the severe anal stenosis.  No specimens collected.  The digital rectal exam revealed a firm, hard and obstructing anal mass. The mass was circumferential. Findings: The entire examined colon appeared normal  Pathology report 4/18/2024. 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  Phone note 4/18/2024.  Patient referred to oncology and colorectal surgery at [place].  CEA at 2.1 on 4/24/2024.  Pelvic MRI report on 4/25/2024. 4.5 cm circular solid lower rectum/anal canal neoplasm without evidence of mucinous features. There is more inferior extent involving the anal  canal at the 12:00/12:30 position and 6:00/6:30 position with involvement of the most inferior aspect of the right external anal sphincter. At the level of the anorectal junction there is also posterior extramural extent of tumor into the intersphincteric space abutting and likely involving the external anal sphincter. Two indeterminate right mesorectal lymph nodes.   Note from Dr. Kaylie Stewart 5/1/2024. Plan for total neoadjuvant chemo.  CT chest report 5/3/2024. No definite evidence of metastatic disease in the chest.  Indeterminate 3 mm and 5 mm right lower lobe pulmonary nodules. Recommend a follow-up chest CT in 3 to 6 months to evaluate for stability. Hepatic steatosis. Cholelithiasis. Bilateral nonobstructing renal calculi.           PREVIOUS INTERVENTIONS:   Patient is undergoing treatment with neoadjuvant FOLFOX 5/21/2024 through present.          Oncology/Hematology History   Rectal cancer   4/24/2024 Initial Diagnosis    Rectal cancer     5/21/2024 -  Chemotherapy    OP COLON [id number] OXALIplatin / Leucovorin / Fluorouracil         PAST MEDICAL HISTORY:  ALLERGIES:  No Known Allergies  CURRENT MEDICATIONS:  Outpatient Encounter Medications as of 7/16/2024   Medication Sig Dispense Refill    acetaminophen (Tylenol) 325 MG tablet Take 3 tablets by mouth Every 8 (Eight) Hours. Take every 8 hours for 3 days then take prn as needed.      albuterol sulfate  (90 Base) MCG/ACT inhaler Inhale 2 puffs Every 6 (Six) Hours As Needed.      HYDROcodone-acetaminophen (NORCO) 5-325 MG per tablet Take 1 tablet by mouth Every 6 (Six) Hours As Needed for Mild Pain or Moderate Pain. (Patient taking differently: Take 0.5 tablets by mouth At Night As Needed for Mild Pain or Moderate Pain (rectal pain from cancer).) 100 tablet 0    ibuprofen (ADVIL,MOTRIN) 200 MG tablet Take 2 tablets by mouth Every 6 (Six) Hours As Needed for Mild Pain.      ibuprofen (Motrin IB) 200 MG tablet Take 3 tablets by mouth Every 8 (Eight)  Hours. Take every 8 hours for three days then take as needed.      lidocaine-prilocaine (EMLA) 2.5-2.5 % cream 30 minute apply generous amount of Emla Cream to port site and cover with clear plastic wrap until ready for use 1 each 2    lidocaine-prilocaine (EMLA) 2.5-2.5 % cream 30 minutes prior to treatment apply generous amount of Emla Cream to your port site and cover with clear plastic wrap until ready for use 30 g 2    loratadine (CLARITIN) 10 MG tablet Take 1 tablet by mouth Daily.      multivitamin (THERAGRAN) tablet tablet Take 1 tablet by mouth Daily.      ondansetron (Zofran) 4 MG tablet Take 1 tablet by mouth Every 8 (Eight) Hours As Needed for Nausea or Vomiting. 15 tablet 0    ondansetron (Zofran) 8 MG tablet Take 1 tablet by mouth Every 8 (Eight) Hours As Needed for Nausea or Vomiting. 60 tablet 2    predniSONE (DELTASONE) 5 MG tablet Take 1 tablet by mouth Daily.      prochlorperazine (COMPAZINE) 10 MG tablet Take 1 tablet by mouth Every 4 (Four) Hours As Needed for Nausea or Vomiting. 60 tablet 2    temazepam (RESTORIL) 7.5 MG capsule Take 1 capsule by mouth At Night As Needed for Sleep. HAS NOT STARTED THIS Rx      traMADol (ULTRAM) 50 MG tablet Take 1 tablet by mouth Every 6 (Six) Hours As Needed for Moderate Pain. 180 tablet 0     No facility-administered encounter medications on file as of 7/16/2024.     ADULT ILLNESSES:  Patient Active Problem List   Diagnosis Code    Rectal bleed K62.5    Rectal cancer C20    Port-A-Cath in place Z95.828     SURGERIES:  Past Surgical History:   Procedure Laterality Date    COLONOSCOPY N/A 4/17/2024    Procedure: COLONOSCOPY WITH ANESTHESIA;  Surgeon: Salty Arias DO;  Location: [facility] ENDOSCOPY;  Service: Gastroenterology;  Laterality: N/A;  preop; rectal bleed   postop questionable anal mass   PCP Duy Lomeli    HEMORROIDECTOMY      HERNIA REPAIR      VENOUS ACCESS DEVICE (PORT) INSERTION N/A 5/16/2024    Procedure: Single Lumen Port-a-cath insertion  "with flouroscopy;  Surgeon: Rosa Mccall MD;  Location: [facility] OR;  Service: General;  Laterality: N/A;     HEALTH MAINTENANCE ITEMS:  Health Maintenance Due   Topic Date Due    BMI FOLLOWUP  Never done    TDAP/TD VACCINES (1 - Tdap) Never done    ZOSTER VACCINE (1 of 2) Never done    Pneumococcal Vaccine 65+ (1 of 1 - PCV) Never done    COVID-19 Vaccine (4 - 2023-24 season) 09/01/2023    HEPATITIS C SCREENING  Never done    ANNUAL WELLNESS VISIT  Never done       <no information>  Last Completed Colonoscopy            Discontinued - COLORECTAL CANCER SCREENING  Discontinued        Frequency changed to Never automatically (Topic No Longer Applies)    05/01/2024  Outside Procedure: COLONOSCOPY    04/17/2024  Colonoscopy    04/17/2024  Surgical Procedure: COLONOSCOPY                  Immunization History   Administered Date(s) Administered    COVID-19 (MODERNA) 1st,2nd,3rd Dose Monovalent 04/22/2021, 05/24/2021    COVID-19 (MODERNA) Monovalent Original Booster 01/04/2022     Last Completed Mammogram       This patient has no relevant Health Maintenance data.              FAMILY HISTORY:  Family History   Problem Relation Age of Onset    Colon cancer Neg Hx     Colon polyps Neg Hx     Esophageal cancer Neg Hx      SOCIAL HISTORY:  Social History     Socioeconomic History    Marital status:    Tobacco Use    Smoking status: Never    Smokeless tobacco: Never   Vaping Use    Vaping status: Never Used   Substance and Sexual Activity    Alcohol use: Never    Drug use: Never    Sexual activity: Defer       REVIEW OF SYSTEMS:    Review of Systems   Constitutional:  Positive for fatigue. Negative for fever and unexpected weight change.        \"I feel tired.\"   HENT:  Negative for congestion and mouth sores.    Eyes:  Negative for discharge and redness.   Respiratory:  Negative for shortness of breath and wheezing.    Cardiovascular:  Negative for chest pain and leg swelling.   Gastrointestinal:  Negative for " "abdominal pain, nausea and vomiting.   Endocrine: Negative for cold intolerance and heat intolerance.   Genitourinary:  Negative for difficulty urinating and dysuria.   Musculoskeletal:  Negative for gait problem and joint swelling.   Skin:  Positive for pallor.   Allergic/Immunologic: Negative for food allergies.   Neurological:  Negative for dizziness and speech difficulty.   Hematological:  Negative for adenopathy. Does not bruise/bleed easily.   Psychiatric/Behavioral:  Negative for agitation, confusion and hallucinations.        VITAL SIGNS: /76   Pulse 95   Temp 97.7 °F (36.5 °C)   Resp 18   Ht 167.6 cm (66\")   Wt 105 kg (232 lb)   SpO2 97%   BMI 37.45 kg/m²  Lost 5 pounds.   Pain Score    07/16/24 1512   PainSc: 0-No pain       PHYSICAL EXAMINATION:     Physical Exam  Vitals reviewed.   Constitutional:       General: He is not in acute distress.  HENT:      Head: Normocephalic and atraumatic.   Eyes:      General: No scleral icterus.  Cardiovascular:      Rate and Rhythm: Normal rate.   Pulmonary:      Effort: No respiratory distress.      Breath sounds: No wheezing.      Comments: Port right. No erythema.   Abdominal:      General: Bowel sounds are normal. There is no distension.      Palpations: Abdomen is soft.   Musculoskeletal:         General: No swelling.      Cervical back: Neck supple.   Skin:     Coloration: Skin is pale.   Neurological:      Mental Status: He is oriented to person, place, and time.   Psychiatric:         Mood and Affect: Mood normal.         Behavior: Behavior normal.         Thought Content: Thought content normal.         Judgment: Judgment normal.         LABS    Lab Results - Last 18 Months   Lab Units 07/16/24  1459 07/09/24  0821 06/25/24  0750 06/18/24  0744 06/04/24  0722 05/21/24  0738 04/24/24  1406 04/24/24  1406 02/29/24  0859   HEMOGLOBIN g/dL 12.1* 12.4* 12.8* 12.6* 12.3* 13.2   < > 13.3 14.2   HEMATOCRIT % 38.1 38.1 41.4 40.2 39.0 42.7   < > 42.7 46.0 "   MCV fL 82.3 80.9 82.1 82.5 83.9 83.7   < > 83.1 84.4   WBC 10*3/mm3 49.68* 5.00 7.32 4.02 5.15 5.40   < > 4.86 6.2   RDW % 15.7* 15.3 14.7 14.4 14.4 13.2   < > 13.2 13.8   MPV fL 10.1 9.4 9.6 9.7 10.0 10.2   < > 9.8 10.1   PLATELETS 10*3/mm3 108* 118* 246 148 176 232   < > 239 298   NEUTROS ABS 10*3/mm3 42.72* 2.04 3.37 1.29* 1.95 2.84   < > 2.46 3.5   LYMPHS ABS K/uL  --   --   --   --   --   --   --   --  1.6   MONOS ABS K/uL  --   --   --   --   --   --   --   --  1.00*   EOS ABS 10*3/mm3  --  0.00 0.00 0.00 0.00 0.00  --   --  0.06   BASOS ABS 10*3/mm3  --  0.05 0.15 0.04 0.05 0.00  --  0.10 0.10   IMMATURE GRANS (ABS) K/uL  --   --   --   --   --   --   --   --  0.0   NEUTROPHIL % % 82.0* 40.8* 44.0 32.0* 37.8* 51.5   < > 47.4  --    MONOCYTES % % 7.0 26.5* 12.0 15.0* 30.6* 15.2*   < > 12.4*  --    BASOPHIL % %  --  1.0 2.0* 1.0 1.0 0.0  --  2.1*  --    ATYP LYMPH % %  --  10.2* 11.0* 8.0* 10.2* 2.0  --  20.6*  --     < > = values in this interval not displayed.       Lab Results - Last 18 Months   Lab Units 07/09/24  0821 06/25/24  0750 06/18/24  0744 06/04/24  0722 05/21/24  0738 04/24/24  1406   GLUCOSE mg/dL 102* 102* 117* 97 98 98   SODIUM mmol/L 139 135* 139 140 139 138   POTASSIUM mmol/L 4.0 4.4 3.9 4.1 3.7 4.4   CO2 mmol/L 25.0 28.0 24.0 28.0 25.0 25.0   CHLORIDE mmol/L 105 99 104 104 103 103   ANION GAP mmol/L 9.0 8.0 11.0 8.0 11.0 10.0   CREATININE mg/dL 0.89 0.97 0.96 0.86 1.04 0.90   BUN mg/dL 9 9 14 12 9 10   BUN / CREAT RATIO  10.1 9.3 14.6 14.0 8.7 11.1   CALCIUM mg/dL 9.0 10.0 9.2 9.1 8.7 9.4   ALK PHOS U/L 96 110 95 91 87 89   TOTAL PROTEIN g/dL 7.1 7.6 7.1 7.2 7.4 7.9   ALT (SGPT) U/L 24 20 21 20 14 16   AST (SGOT) U/L 29 25 25 23 21 21   BILIRUBIN mg/dL 0.4 0.3 0.5 0.4 0.4 0.5   ALBUMIN g/dL 3.9 4.1 4.0 4.1 4.0 4.2   GLOBULIN gm/dL 3.2 3.5 3.1 3.1 3.4 3.7       Lab Results - Last 18 Months   Lab Units 04/24/24  1406   CEA ng/mL 2.10       Lab Results - Last 18 Months   Lab Units  "06/18/24  0744 06/04/24  0722 02/29/24  0859   IRON mcg/dL  --  97  --    TIBC mcg/dL  --  328  --    IRON SATURATION (TSAT) %  --  30  --    FERRITIN ng/mL  --  263.00  --    TSH uIU/mL  --   --  2.460   FOLATE ng/mL >20.00  --   --        Antwan Stevenson reports a pain score of 0.            ASSESSMENT:  Adenocarcinoma the rectum. KIKE on 4/28/2024.  AJCC stage:IIIb (cT3, cN1b, cM0)  Treatment status: He is on neoadjuvant FOLFOX from 5/21/2024 through present.  To be followed by CIV 5FU with radiation.   2.  Performance status of 1.  3.  Grade 2 neutropenia without fever from chemo.  He is on pegfilgrastim as prophylaxis.  4.  Normocytic anemia secondary to chemo.  5.  ?Interstitial granulomatous dermatitis with arthritis. On prednisone 5 mg daily.    6.  Elevated liver function test. Await CT abdomen.  7.  Neutrophilia from Neulasta.  8.  Bibasilar pulmonary micronodules measuring up to 0.4 cm on 4/11/2024.  On observation.        PLAN:   Re: Tolerance to neoadjuvant FOLFOX.  \"I was foggy.  Fatigued and tummy upset.\"  2.   Re:  Heme status.  WBC 49.6, ANC 42.7, hemoglobin 12.1, hematocrit 38.1, MCV 82.3 and platelet 108.  B12 1134 and folate > 20.  3.   Re:  CMP. GFR AST 48 and alkaline phosphatase 444.  4.   Re:  Magnesium 2.1.  5.   Re: Ferritin pending.  6.   Re: Iron saturation pending%.  7.  Proceed with total neoadjuvant FOLFOX if ANC at least 1.5.  Then he will undergo neoadjuvant CIV 5-FU with radiation.   8.  To complete 12 cycles of neoadjuvant FOLFOX:  9.  Monitor for infusion reactions, anaphylaxis, nausea, vomiting, diarrhea, cold intolerance, myelosuppression, alopecia or neuropathy.  10.  Schedule treatment C5 D1 to 3 on 7/30/2024 (Going to [place]) and C6 D1 to D3 on 8/13/2024.  To be administered every 2 weeks: Oxaliplatin 85 mg/m2 IVPB.  Leucovorin 400 mg/m2.  5- mg/ m2  IVP.  Begin 5-FU 2,400 mg/m2 IVPB over 46 hours.    11.    Premedicate " with:  Aloxi 0.25 mg IVP.  Emend 150 mg IV.  Decadron 12 mg IVPB over 20-30 min.  12.  PEG filgrastim D3 as primary prophylaxis.  Cycle 3 on 6/18/2024 delayed due to ANC of 1.2.  Monitor for arthralgias.  13.   Weekly CBC with differential, magnesium, and CMP with FOLFOX.  14.  eRx ondansetron 8 mg p.o. every 8 hours as needed for nausea and vomiting #60, 2 refills if needed.  15.  eRx Compazine 10 mg p.o. every 4 hours as needed for nausea and vomiting #60, 2 refills if needed.  16.  eRx tramadol 50 mg p.o. every 6 hours as needed for pain, #180, 0 refill if needed.  17.  Continue care per primary care physician and the other specialists.  18.  Order CT abdomen and pelvis for elevated LFTs.   19.  Plan of care discussed with patient.  Understanding expressed.  The patient is agreeable to proceed.  20.  Advance Care Planning  ACP discussion was declined by the patient. Patient does not have an advance directive, information provided.    21. Return to office in 6 weeks.Repeat MRI pelvis and CT chest after neoadjuvant chemo.       I have reviewed the assessment and plan and verified the accuracy of it. No changes to assessment and plan since the information was documented. Raul Mayberry MD 07/16/24         I spent 40 total minutes, face-to-face, caring for Antwan today. Greater than 50% of this time involved counseling and/or coordination of care as documented within this note.           (Salty Arias DO)  (Kaylie Stewart MD)   (Marty Benitez MD radiation oncology [place] [facility].  (Bruno Sanford MD)  (Duy Lomeli MD)

## 2024-07-16 NOTE — TELEPHONE ENCOUNTER
CRITICAL LAB VALUE  Received call from [facility] with CRITICAL LAB VALUE OF:    WBC: 49.68    This information was sent to Dr Mayberry for review

## 2024-07-22 ENCOUNTER — LAB (OUTPATIENT)
Dept: LAB | Facility: HOSPITAL | Age: 64
End: 2024-07-22
Payer: MEDICARE

## 2024-07-22 DIAGNOSIS — C20 RECTAL CANCER: ICD-10-CM

## 2024-07-22 LAB
ALBUMIN SERPL-MCNC: 3.9 G/DL (ref 3.5–5.2)
ALBUMIN/GLOB SERPL: 1 G/DL
ALP SERPL-CCNC: 220 U/L (ref 39–117)
ALT SERPL W P-5'-P-CCNC: 24 U/L (ref 1–41)
ANION GAP SERPL CALCULATED.3IONS-SCNC: 13 MMOL/L (ref 5–15)
ANISOCYTOSIS BLD QL: ABNORMAL
AST SERPL-CCNC: 38 U/L (ref 1–40)
BASOPHILS # BLD MANUAL: 0.1 10*3/MM3 (ref 0–0.2)
BASOPHILS NFR BLD MANUAL: 1 % (ref 0–1.5)
BILIRUB SERPL-MCNC: 0.7 MG/DL (ref 0–1.2)
BUN SERPL-MCNC: 13 MG/DL (ref 8–23)
BUN/CREAT SERPL: 10.8 (ref 7–25)
CALCIUM SPEC-SCNC: 9.3 MG/DL (ref 8.6–10.5)
CHLORIDE SERPL-SCNC: 100 MMOL/L (ref 98–107)
CO2 SERPL-SCNC: 24 MMOL/L (ref 22–29)
CREAT SERPL-MCNC: 1.2 MG/DL (ref 0.76–1.27)
DEPRECATED RDW RBC AUTO: 49.5 FL (ref 37–54)
EGFRCR SERPLBLD CKD-EPI 2021: 66.7 ML/MIN/1.73
EOSINOPHIL # BLD MANUAL: 0.1 10*3/MM3 (ref 0–0.4)
EOSINOPHIL NFR BLD MANUAL: 1 % (ref 0.3–6.2)
ERYTHROCYTE [DISTWIDTH] IN BLOOD BY AUTOMATED COUNT: 17.2 % (ref 12.3–15.4)
GLOBULIN UR ELPH-MCNC: 3.9 GM/DL
GLUCOSE SERPL-MCNC: 145 MG/DL (ref 65–99)
HCT VFR BLD AUTO: 44.1 % (ref 37.5–51)
HGB BLD-MCNC: 13.6 G/DL (ref 13–17.7)
LYMPHOCYTES # BLD MANUAL: 1.46 10*3/MM3 (ref 0.7–3.1)
LYMPHOCYTES NFR BLD MANUAL: 7.1 % (ref 5–12)
MAGNESIUM SERPL-MCNC: 2.2 MG/DL (ref 1.6–2.4)
MCH RBC QN AUTO: 26 PG (ref 26.6–33)
MCHC RBC AUTO-ENTMCNC: 30.8 G/DL (ref 31.5–35.7)
MCV RBC AUTO: 84.2 FL (ref 79–97)
METAMYELOCYTES NFR BLD MANUAL: 2 % (ref 0–0)
MONOCYTES # BLD: 0.69 10*3/MM3 (ref 0.1–0.9)
NEUTROPHILS # BLD AUTO: 7.15 10*3/MM3 (ref 1.7–7)
NEUTROPHILS NFR BLD MANUAL: 70.7 % (ref 42.7–76)
NEUTS BAND NFR BLD MANUAL: 3 % (ref 0–5)
NRBC SPEC MANUAL: 1 /100 WBC (ref 0–0.2)
PLATELET # BLD AUTO: 60 10*3/MM3 (ref 140–450)
PMV BLD AUTO: 10.6 FL (ref 6–12)
POTASSIUM SERPL-SCNC: 4.3 MMOL/L (ref 3.5–5.2)
PROT SERPL-MCNC: 7.8 G/DL (ref 6–8.5)
RBC # BLD AUTO: 5.24 10*6/MM3 (ref 4.14–5.8)
SMALL PLATELETS BLD QL SMEAR: ABNORMAL
SODIUM SERPL-SCNC: 137 MMOL/L (ref 136–145)
SPHEROCYTES BLD QL SMEAR: ABNORMAL
VARIANT LYMPHS NFR BLD MANUAL: 12.1 % (ref 19.6–45.3)
VARIANT LYMPHS NFR BLD MANUAL: 3 % (ref 0–5)
WBC MORPH BLD: NORMAL
WBC NRBC COR # BLD AUTO: 9.7 10*3/MM3 (ref 3.4–10.8)

## 2024-07-22 PROCEDURE — 85025 COMPLETE CBC W/AUTO DIFF WBC: CPT

## 2024-07-22 PROCEDURE — 36415 COLL VENOUS BLD VENIPUNCTURE: CPT

## 2024-07-22 PROCEDURE — 83735 ASSAY OF MAGNESIUM: CPT

## 2024-07-22 PROCEDURE — 80053 COMPREHEN METABOLIC PANEL: CPT

## 2024-07-22 PROCEDURE — 85007 BL SMEAR W/DIFF WBC COUNT: CPT

## 2024-07-30 ENCOUNTER — TELEPHONE (OUTPATIENT)
Dept: ONCOLOGY | Facility: CLINIC | Age: 64
End: 2024-07-30
Payer: MEDICARE

## 2024-07-30 ENCOUNTER — LAB (OUTPATIENT)
Dept: LAB | Facility: HOSPITAL | Age: 64
End: 2024-07-30
Payer: MEDICARE

## 2024-07-30 ENCOUNTER — INFUSION (OUTPATIENT)
Dept: ONCOLOGY | Facility: HOSPITAL | Age: 64
End: 2024-07-30
Payer: MEDICARE

## 2024-07-30 VITALS
HEIGHT: 66 IN | DIASTOLIC BLOOD PRESSURE: 79 MMHG | BODY MASS INDEX: 37.48 KG/M2 | HEART RATE: 82 BPM | SYSTOLIC BLOOD PRESSURE: 151 MMHG | WEIGHT: 233.2 LBS | RESPIRATION RATE: 17 BRPM | OXYGEN SATURATION: 97 % | TEMPERATURE: 97.2 F

## 2024-07-30 DIAGNOSIS — C20 RECTAL CANCER: Primary | ICD-10-CM

## 2024-07-30 DIAGNOSIS — Z95.828 PORT-A-CATH IN PLACE: ICD-10-CM

## 2024-07-30 DIAGNOSIS — C20 RECTAL CANCER: ICD-10-CM

## 2024-07-30 LAB
ALBUMIN SERPL-MCNC: 3.8 G/DL (ref 3.5–5.2)
ALBUMIN/GLOB SERPL: 1.2 G/DL
ALP SERPL-CCNC: 117 U/L (ref 39–117)
ALT SERPL W P-5'-P-CCNC: 23 U/L (ref 1–41)
ANION GAP SERPL CALCULATED.3IONS-SCNC: 11 MMOL/L (ref 5–15)
ANISOCYTOSIS BLD QL: ABNORMAL
AST SERPL-CCNC: 32 U/L (ref 1–40)
BASOPHILS # BLD MANUAL: 0 10*3/MM3 (ref 0–0.2)
BASOPHILS NFR BLD MANUAL: 0 % (ref 0–1.5)
BILIRUB SERPL-MCNC: 0.4 MG/DL (ref 0–1.2)
BUN SERPL-MCNC: 7 MG/DL (ref 8–23)
BUN/CREAT SERPL: 8.4 (ref 7–25)
CALCIUM SPEC-SCNC: 9 MG/DL (ref 8.6–10.5)
CHLORIDE SERPL-SCNC: 106 MMOL/L (ref 98–107)
CO2 SERPL-SCNC: 23 MMOL/L (ref 22–29)
CREAT SERPL-MCNC: 0.83 MG/DL (ref 0.76–1.27)
DEPRECATED RDW RBC AUTO: 49.3 FL (ref 37–54)
EGFRCR SERPLBLD CKD-EPI 2021: 96.5 ML/MIN/1.73
EOSINOPHIL # BLD MANUAL: 0 10*3/MM3 (ref 0–0.4)
EOSINOPHIL NFR BLD MANUAL: 0 % (ref 0.3–6.2)
ERYTHROCYTE [DISTWIDTH] IN BLOOD BY AUTOMATED COUNT: 16.8 % (ref 12.3–15.4)
GLOBULIN UR ELPH-MCNC: 3.3 GM/DL
GLUCOSE SERPL-MCNC: 86 MG/DL (ref 65–99)
HCT VFR BLD AUTO: 37.6 % (ref 37.5–51)
HGB BLD-MCNC: 12 G/DL (ref 13–17.7)
LYMPHOCYTES # BLD MANUAL: 3.13 10*3/MM3 (ref 0.7–3.1)
LYMPHOCYTES NFR BLD MANUAL: 13.1 % (ref 5–12)
MAGNESIUM SERPL-MCNC: 2.1 MG/DL (ref 1.6–2.4)
MCH RBC QN AUTO: 26.3 PG (ref 26.6–33)
MCHC RBC AUTO-ENTMCNC: 31.9 G/DL (ref 31.5–35.7)
MCV RBC AUTO: 82.3 FL (ref 79–97)
MICROCYTES BLD QL: ABNORMAL
MONOCYTES # BLD: 0.64 10*3/MM3 (ref 0.1–0.9)
NEUTROPHILS # BLD AUTO: 1.14 10*3/MM3 (ref 1.7–7)
NEUTROPHILS NFR BLD MANUAL: 22.2 % (ref 42.7–76)
NEUTS BAND NFR BLD MANUAL: 1 % (ref 0–5)
PLAT MORPH BLD: NORMAL
PLATELET # BLD AUTO: 176 10*3/MM3 (ref 140–450)
PMV BLD AUTO: 9.4 FL (ref 6–12)
POLYCHROMASIA BLD QL SMEAR: ABNORMAL
POTASSIUM SERPL-SCNC: 4.2 MMOL/L (ref 3.5–5.2)
PROT SERPL-MCNC: 7.1 G/DL (ref 6–8.5)
RBC # BLD AUTO: 4.57 10*6/MM3 (ref 4.14–5.8)
SODIUM SERPL-SCNC: 140 MMOL/L (ref 136–145)
VARIANT LYMPHS NFR BLD MANUAL: 11.1 % (ref 0–5)
VARIANT LYMPHS NFR BLD MANUAL: 52.5 % (ref 19.6–45.3)
WBC MORPH BLD: NORMAL
WBC NRBC COR # BLD AUTO: 4.92 10*3/MM3 (ref 3.4–10.8)

## 2024-07-30 PROCEDURE — 85007 BL SMEAR W/DIFF WBC COUNT: CPT

## 2024-07-30 PROCEDURE — 36415 COLL VENOUS BLD VENIPUNCTURE: CPT

## 2024-07-30 PROCEDURE — 80053 COMPREHEN METABOLIC PANEL: CPT

## 2024-07-30 PROCEDURE — 83735 ASSAY OF MAGNESIUM: CPT

## 2024-07-30 PROCEDURE — 85025 COMPLETE CBC W/AUTO DIFF WBC: CPT

## 2024-07-30 PROCEDURE — G0463 HOSPITAL OUTPT CLINIC VISIT: HCPCS

## 2024-07-30 NOTE — TELEPHONE ENCOUNTER
Received message from Nurse [facility]. Nurse reports patient had informed her that he did not want to take the Neulasta injection with this round of treatment, he feels that due to use of Neulasta last time he had issues with blood in his urine as well as elevated LFT's.   Contacted pharmacy and relayed this information to Pharmacist, Abdullahi. He researched possible side effects associated with Neulasta and he was not able to find that it can cause the issues patient was concerned of and experienced with use of Neulasta preiously.   Pharmacist recommends that patient does need the Neulasta due to ANC Count of 1.14 today.     Relayed all information to Dr Courtney, after review of patient chart and information, he then felt it would be best to hold patient txt this cycle so he can discuss his concerns with Dr Mayberry when he returns and also due to low ANC count today 7/30/24.     Relayed all information to Sabra/Nurse leandro v/u and will yany patient txt apt accordingly.

## 2024-07-30 NOTE — PROGRESS NOTES
"Reported low ANC 1.14 and patient's concerns with not wanting Neulasta pod anymore because he said it caused \"blood in urine and liver enzymes elevated and could not get out of bed\". Edith spoke with Dr Courtney who is on call for Dr Mayberry as well as the pharmacist who does not believe it was the Neulasta causing these symptoms. Per Edith, Dr Courtney wants to hold treatment for low ANC and have patient come back to discuss concerns with Dr Mayberry. Conveyed to patient what office said, patient verbalized understanding.   "

## 2024-08-02 ENCOUNTER — HOSPITAL ENCOUNTER (OUTPATIENT)
Dept: CT IMAGING | Facility: HOSPITAL | Age: 64
Discharge: HOME | End: 2024-08-02
Payer: MEDICARE

## 2024-08-02 DIAGNOSIS — C20 RECTAL CANCER: ICD-10-CM

## 2024-08-02 PROCEDURE — 25510000001 IOPAMIDOL PER 1 ML: Performed by: INTERNAL MEDICINE

## 2024-08-02 PROCEDURE — 74178 CT ABD&PLV WO CNTR FLWD CNTR: CPT

## 2024-08-02 RX ADMIN — IOPAMIDOL 100 ML: 755 INJECTION, SOLUTION INTRAVENOUS at 08:58

## 2024-08-06 ENCOUNTER — LAB (OUTPATIENT)
Dept: LAB | Facility: HOSPITAL | Age: 64
End: 2024-08-06
Payer: MEDICARE

## 2024-08-06 DIAGNOSIS — C20 RECTAL CANCER: ICD-10-CM

## 2024-08-06 LAB
ALBUMIN SERPL-MCNC: 3.9 G/DL (ref 3.5–5.2)
ALBUMIN/GLOB SERPL: 1 G/DL
ALP SERPL-CCNC: 107 U/L (ref 39–117)
ALT SERPL W P-5'-P-CCNC: 17 U/L (ref 1–41)
ANION GAP SERPL CALCULATED.3IONS-SCNC: 10 MMOL/L (ref 5–15)
ANISOCYTOSIS BLD QL: ABNORMAL
AST SERPL-CCNC: 26 U/L (ref 1–40)
BASOPHILS # BLD MANUAL: 0 10*3/MM3 (ref 0–0.2)
BASOPHILS NFR BLD MANUAL: 0 % (ref 0–1.5)
BILIRUB SERPL-MCNC: 0.4 MG/DL (ref 0–1.2)
BUN SERPL-MCNC: 8 MG/DL (ref 8–23)
BUN/CREAT SERPL: 9.9 (ref 7–25)
CALCIUM SPEC-SCNC: 9.2 MG/DL (ref 8.6–10.5)
CHLORIDE SERPL-SCNC: 104 MMOL/L (ref 98–107)
CO2 SERPL-SCNC: 24 MMOL/L (ref 22–29)
CREAT SERPL-MCNC: 0.81 MG/DL (ref 0.76–1.27)
DEPRECATED RDW RBC AUTO: 49.8 FL (ref 37–54)
EGFRCR SERPLBLD CKD-EPI 2021: 97.2 ML/MIN/1.73
EOSINOPHIL # BLD MANUAL: 0.05 10*3/MM3 (ref 0–0.4)
EOSINOPHIL NFR BLD MANUAL: 1 % (ref 0.3–6.2)
ERYTHROCYTE [DISTWIDTH] IN BLOOD BY AUTOMATED COUNT: 16.2 % (ref 12.3–15.4)
GLOBULIN UR ELPH-MCNC: 4 GM/DL
GLUCOSE SERPL-MCNC: 130 MG/DL (ref 65–99)
HCT VFR BLD AUTO: 40.9 % (ref 37.5–51)
HGB BLD-MCNC: 12.7 G/DL (ref 13–17.7)
LYMPHOCYTES # BLD MANUAL: 3.06 10*3/MM3 (ref 0.7–3.1)
LYMPHOCYTES NFR BLD MANUAL: 20 % (ref 5–12)
MAGNESIUM SERPL-MCNC: 2 MG/DL (ref 1.6–2.4)
MCH RBC QN AUTO: 25.9 PG (ref 26.6–33)
MCHC RBC AUTO-ENTMCNC: 31.1 G/DL (ref 31.5–35.7)
MCV RBC AUTO: 83.5 FL (ref 79–97)
MONOCYTES # BLD: 1.09 10*3/MM3 (ref 0.1–0.9)
NEUTROPHILS # BLD AUTO: 1.26 10*3/MM3 (ref 1.7–7)
NEUTROPHILS NFR BLD MANUAL: 23 % (ref 42.7–76)
PLAT MORPH BLD: NORMAL
PLATELET # BLD AUTO: 144 10*3/MM3 (ref 140–450)
PMV BLD AUTO: 9.3 FL (ref 6–12)
POLYCHROMASIA BLD QL SMEAR: ABNORMAL
POTASSIUM SERPL-SCNC: 3.8 MMOL/L (ref 3.5–5.2)
PROT SERPL-MCNC: 7.9 G/DL (ref 6–8.5)
RBC # BLD AUTO: 4.9 10*6/MM3 (ref 4.14–5.8)
SODIUM SERPL-SCNC: 138 MMOL/L (ref 136–145)
VARIANT LYMPHS NFR BLD MANUAL: 49 % (ref 19.6–45.3)
VARIANT LYMPHS NFR BLD MANUAL: 7 % (ref 0–5)
WBC MORPH BLD: NORMAL
WBC NRBC COR # BLD AUTO: 5.47 10*3/MM3 (ref 3.4–10.8)

## 2024-08-06 PROCEDURE — 36415 COLL VENOUS BLD VENIPUNCTURE: CPT

## 2024-08-06 PROCEDURE — 85007 BL SMEAR W/DIFF WBC COUNT: CPT

## 2024-08-06 PROCEDURE — 85025 COMPLETE CBC W/AUTO DIFF WBC: CPT

## 2024-08-06 PROCEDURE — 83735 ASSAY OF MAGNESIUM: CPT

## 2024-08-06 PROCEDURE — 80053 COMPREHEN METABOLIC PANEL: CPT

## 2024-08-09 DIAGNOSIS — C20 RECTAL CANCER: Primary | ICD-10-CM

## 2024-08-09 DIAGNOSIS — Z95.828 PORT-A-CATH IN PLACE: ICD-10-CM

## 2024-08-09 RX ORDER — DIPHENHYDRAMINE HYDROCHLORIDE 50 MG/ML
50 INJECTION INTRAMUSCULAR; INTRAVENOUS AS NEEDED
OUTPATIENT
Start: 2024-08-27

## 2024-08-09 RX ORDER — FLUOROURACIL 50 MG/ML
400 INJECTION, SOLUTION INTRAVENOUS ONCE
OUTPATIENT
Start: 2024-08-27

## 2024-08-09 RX ORDER — FAMOTIDINE 10 MG/ML
20 INJECTION, SOLUTION INTRAVENOUS AS NEEDED
OUTPATIENT
Start: 2024-08-27

## 2024-08-09 RX ORDER — DEXTROSE MONOHYDRATE 50 MG/ML
20 INJECTION, SOLUTION INTRAVENOUS ONCE
OUTPATIENT
Start: 2024-08-27

## 2024-08-09 RX ORDER — PALONOSETRON 0.05 MG/ML
0.25 INJECTION, SOLUTION INTRAVENOUS ONCE
OUTPATIENT
Start: 2024-08-27

## 2024-08-13 ENCOUNTER — INFUSION (OUTPATIENT)
Dept: ONCOLOGY | Facility: HOSPITAL | Age: 64
End: 2024-08-13
Payer: MEDICARE

## 2024-08-13 ENCOUNTER — APPOINTMENT (OUTPATIENT)
Dept: LAB | Facility: HOSPITAL | Age: 64
End: 2024-08-13
Payer: MEDICARE

## 2024-08-13 ENCOUNTER — LAB (OUTPATIENT)
Dept: LAB | Facility: HOSPITAL | Age: 64
End: 2024-08-13
Payer: MEDICARE

## 2024-08-13 VITALS
OXYGEN SATURATION: 97 % | SYSTOLIC BLOOD PRESSURE: 147 MMHG | WEIGHT: 232 LBS | DIASTOLIC BLOOD PRESSURE: 73 MMHG | RESPIRATION RATE: 18 BRPM | BODY MASS INDEX: 37.28 KG/M2 | HEART RATE: 77 BPM | TEMPERATURE: 98 F | HEIGHT: 66 IN

## 2024-08-13 DIAGNOSIS — C20 RECTAL CANCER: Primary | ICD-10-CM

## 2024-08-13 DIAGNOSIS — C20 RECTAL CANCER: ICD-10-CM

## 2024-08-13 DIAGNOSIS — Z95.828 PORT-A-CATH IN PLACE: ICD-10-CM

## 2024-08-13 LAB
ALBUMIN SERPL-MCNC: 4.1 G/DL (ref 3.5–5.2)
ALBUMIN/GLOB SERPL: 1.1 G/DL
ALP SERPL-CCNC: 106 U/L (ref 39–117)
ALT SERPL W P-5'-P-CCNC: 17 U/L (ref 1–41)
ANION GAP SERPL CALCULATED.3IONS-SCNC: 10 MMOL/L (ref 5–15)
AST SERPL-CCNC: 24 U/L (ref 1–40)
BASOPHILS # BLD MANUAL: 0 10*3/MM3 (ref 0–0.2)
BASOPHILS NFR BLD MANUAL: 0 % (ref 0–1.5)
BILIRUB SERPL-MCNC: 0.6 MG/DL (ref 0–1.2)
BUN SERPL-MCNC: 9 MG/DL (ref 8–23)
BUN/CREAT SERPL: 9.8 (ref 7–25)
CALCIUM SPEC-SCNC: 9.4 MG/DL (ref 8.6–10.5)
CHLORIDE SERPL-SCNC: 104 MMOL/L (ref 98–107)
CO2 SERPL-SCNC: 27 MMOL/L (ref 22–29)
CREAT SERPL-MCNC: 0.92 MG/DL (ref 0.76–1.27)
DEPRECATED RDW RBC AUTO: 50.3 FL (ref 37–54)
EGFRCR SERPLBLD CKD-EPI 2021: 91.7 ML/MIN/1.73
EOSINOPHIL # BLD MANUAL: 0.23 10*3/MM3 (ref 0–0.4)
EOSINOPHIL NFR BLD MANUAL: 4 % (ref 0.3–6.2)
ERYTHROCYTE [DISTWIDTH] IN BLOOD BY AUTOMATED COUNT: 16.2 % (ref 12.3–15.4)
GLOBULIN UR ELPH-MCNC: 3.6 GM/DL
GLUCOSE SERPL-MCNC: 95 MG/DL (ref 65–99)
HCT VFR BLD AUTO: 40 % (ref 37.5–51)
HGB BLD-MCNC: 12.5 G/DL (ref 13–17.7)
LYMPHOCYTES # BLD MANUAL: 2.56 10*3/MM3 (ref 0.7–3.1)
LYMPHOCYTES NFR BLD MANUAL: 17.2 % (ref 5–12)
MAGNESIUM SERPL-MCNC: 2.2 MG/DL (ref 1.6–2.4)
MCH RBC QN AUTO: 26.5 PG (ref 26.6–33)
MCHC RBC AUTO-ENTMCNC: 31.3 G/DL (ref 31.5–35.7)
MCV RBC AUTO: 84.7 FL (ref 79–97)
MONOCYTES # BLD: 0.97 10*3/MM3 (ref 0.1–0.9)
NEUTROPHILS # BLD AUTO: 1.88 10*3/MM3 (ref 1.7–7)
NEUTROPHILS NFR BLD MANUAL: 32.3 % (ref 42.7–76)
NEUTS BAND NFR BLD MANUAL: 1 % (ref 0–5)
PLAT MORPH BLD: NORMAL
PLATELET # BLD AUTO: 190 10*3/MM3 (ref 140–450)
PMV BLD AUTO: 9.6 FL (ref 6–12)
POTASSIUM SERPL-SCNC: 4.6 MMOL/L (ref 3.5–5.2)
PROT SERPL-MCNC: 7.7 G/DL (ref 6–8.5)
RBC # BLD AUTO: 4.72 10*6/MM3 (ref 4.14–5.8)
RBC MORPH BLD: NORMAL
SODIUM SERPL-SCNC: 141 MMOL/L (ref 136–145)
VARIANT LYMPHS NFR BLD MANUAL: 1 % (ref 0–5)
VARIANT LYMPHS NFR BLD MANUAL: 44.4 % (ref 19.6–45.3)
WBC MORPH BLD: NORMAL
WBC NRBC COR # BLD AUTO: 5.64 10*3/MM3 (ref 3.4–10.8)

## 2024-08-13 PROCEDURE — 80053 COMPREHEN METABOLIC PANEL: CPT

## 2024-08-13 PROCEDURE — 36415 COLL VENOUS BLD VENIPUNCTURE: CPT

## 2024-08-13 PROCEDURE — 96415 CHEMO IV INFUSION ADDL HR: CPT

## 2024-08-13 PROCEDURE — 96368 THER/DIAG CONCURRENT INF: CPT

## 2024-08-13 PROCEDURE — 25010000002 FOSAPREPITANT PER 1 MG: Performed by: INTERNAL MEDICINE

## 2024-08-13 PROCEDURE — 25010000002 LEUCOVORIN CALCIUM PER 50 MG: Performed by: INTERNAL MEDICINE

## 2024-08-13 PROCEDURE — 85025 COMPLETE CBC W/AUTO DIFF WBC: CPT

## 2024-08-13 PROCEDURE — 96413 CHEMO IV INFUSION 1 HR: CPT

## 2024-08-13 PROCEDURE — 83735 ASSAY OF MAGNESIUM: CPT

## 2024-08-13 PROCEDURE — 96411 CHEMO IV PUSH ADDL DRUG: CPT

## 2024-08-13 PROCEDURE — 0 DEXTROSE 5 % SOLUTION: Performed by: INTERNAL MEDICINE

## 2024-08-13 PROCEDURE — 25010000002 PALONOSETRON PER 25 MCG: Performed by: INTERNAL MEDICINE

## 2024-08-13 PROCEDURE — 96375 TX/PRO/DX INJ NEW DRUG ADDON: CPT

## 2024-08-13 PROCEDURE — G0498 CHEMO EXTEND IV INFUS W/PUMP: HCPCS

## 2024-08-13 PROCEDURE — 0 DEXTROSE 5 % SOLUTION 250 ML FLEX CONT: Performed by: INTERNAL MEDICINE

## 2024-08-13 PROCEDURE — 25010000002 FLUOROURACIL PER 500 MG: Performed by: INTERNAL MEDICINE

## 2024-08-13 PROCEDURE — 25010000002 OXALIPLATIN PER 0.5 MG: Performed by: INTERNAL MEDICINE

## 2024-08-13 PROCEDURE — 25010000002 DEXAMETHASONE SODIUM PHOSPHATE 100 MG/10ML SOLUTION: Performed by: INTERNAL MEDICINE

## 2024-08-13 PROCEDURE — 96367 TX/PROPH/DG ADDL SEQ IV INF: CPT

## 2024-08-13 PROCEDURE — 25010000002 LEUCOVORIN 200 MG RECONSTITUTED SOLUTION 200 MG VIAL: Performed by: INTERNAL MEDICINE

## 2024-08-13 PROCEDURE — 85007 BL SMEAR W/DIFF WBC COUNT: CPT

## 2024-08-13 RX ORDER — PALONOSETRON 0.05 MG/ML
0.25 INJECTION, SOLUTION INTRAVENOUS ONCE
Status: COMPLETED | OUTPATIENT
Start: 2024-08-13 | End: 2024-08-13

## 2024-08-13 RX ORDER — FLUOROURACIL 50 MG/ML
400 INJECTION, SOLUTION INTRAVENOUS ONCE
Status: COMPLETED | OUTPATIENT
Start: 2024-08-13 | End: 2024-08-13

## 2024-08-13 RX ORDER — FAMOTIDINE 10 MG/ML
20 INJECTION, SOLUTION INTRAVENOUS AS NEEDED
Status: DISCONTINUED | OUTPATIENT
Start: 2024-08-13 | End: 2024-08-13 | Stop reason: HOSPADM

## 2024-08-13 RX ORDER — DIPHENHYDRAMINE HYDROCHLORIDE 50 MG/ML
50 INJECTION INTRAMUSCULAR; INTRAVENOUS AS NEEDED
Status: DISCONTINUED | OUTPATIENT
Start: 2024-08-13 | End: 2024-08-13 | Stop reason: HOSPADM

## 2024-08-13 RX ORDER — DEXTROSE MONOHYDRATE 50 MG/ML
20 INJECTION, SOLUTION INTRAVENOUS ONCE
Status: COMPLETED | OUTPATIENT
Start: 2024-08-13 | End: 2024-08-13

## 2024-08-13 RX ADMIN — FOSAPREPITANT 150 MG: 150 INJECTION, POWDER, LYOPHILIZED, FOR SOLUTION INTRAVENOUS at 09:47

## 2024-08-13 RX ADMIN — FLUOROURACIL 860 MG: 50 INJECTION, SOLUTION INTRAVENOUS at 12:35

## 2024-08-13 RX ADMIN — FLUOROURACIL 5160 MG: 50 INJECTION, SOLUTION INTRAVENOUS at 12:41

## 2024-08-13 RX ADMIN — DEXAMETHASONE SODIUM PHOSPHATE 12 MG: 10 INJECTION, SOLUTION INTRAMUSCULAR; INTRAVENOUS at 09:30

## 2024-08-13 RX ADMIN — PALONOSETRON HYDROCHLORIDE 0.25 MG: 0.25 INJECTION, SOLUTION INTRAVENOUS at 09:28

## 2024-08-13 RX ADMIN — DEXTROSE MONOHYDRATE 20 ML/HR: 50 INJECTION, SOLUTION INTRAVENOUS at 09:28

## 2024-08-13 RX ADMIN — LEUCOVORIN CALCIUM 860 MG: 350 INJECTION, POWDER, LYOPHILIZED, FOR SUSPENSION INTRAMUSCULAR; INTRAVENOUS at 10:21

## 2024-08-13 RX ADMIN — OXALIPLATIN 185 MG: 5 INJECTION, SOLUTION INTRAVENOUS at 10:20

## 2024-08-15 ENCOUNTER — INFUSION (OUTPATIENT)
Dept: ONCOLOGY | Facility: HOSPITAL | Age: 64
End: 2024-08-15
Payer: MEDICARE

## 2024-08-15 VITALS
HEIGHT: 66 IN | WEIGHT: 232 LBS | HEART RATE: 64 BPM | RESPIRATION RATE: 18 BRPM | TEMPERATURE: 97.3 F | BODY MASS INDEX: 37.28 KG/M2 | SYSTOLIC BLOOD PRESSURE: 129 MMHG | OXYGEN SATURATION: 98 % | DIASTOLIC BLOOD PRESSURE: 62 MMHG

## 2024-08-15 DIAGNOSIS — C20 RECTAL CANCER: Primary | ICD-10-CM

## 2024-08-15 DIAGNOSIS — Z95.828 PORT-A-CATH IN PLACE: ICD-10-CM

## 2024-08-15 PROCEDURE — 25010000002 HEPARIN LOCK FLUSH PER 10 UNITS: Performed by: INTERNAL MEDICINE

## 2024-08-15 RX ORDER — SODIUM CHLORIDE 0.9 % (FLUSH) 0.9 %
10 SYRINGE (ML) INJECTION AS NEEDED
OUTPATIENT
Start: 2024-08-15

## 2024-08-15 RX ORDER — HEPARIN SODIUM (PORCINE) LOCK FLUSH IV SOLN 100 UNIT/ML 100 UNIT/ML
500 SOLUTION INTRAVENOUS AS NEEDED
Status: DISCONTINUED | OUTPATIENT
Start: 2024-08-15 | End: 2024-08-15 | Stop reason: HOSPADM

## 2024-08-15 RX ORDER — SODIUM CHLORIDE 0.9 % (FLUSH) 0.9 %
10 SYRINGE (ML) INJECTION AS NEEDED
Status: DISCONTINUED | OUTPATIENT
Start: 2024-08-15 | End: 2024-08-15 | Stop reason: HOSPADM

## 2024-08-15 RX ORDER — HEPARIN SODIUM (PORCINE) LOCK FLUSH IV SOLN 100 UNIT/ML 100 UNIT/ML
500 SOLUTION INTRAVENOUS AS NEEDED
OUTPATIENT
Start: 2024-08-15

## 2024-08-15 RX ADMIN — Medication 10 ML: at 10:47

## 2024-08-15 RX ADMIN — Medication 500 UNITS: at 10:47

## 2024-08-15 NOTE — PROGRESS NOTES
Patient does not want neulasta pod notified pharmacy and Dr. Mayberry's office. Constance Santana RN

## 2024-08-20 NOTE — PROGRESS NOTES
[facility] ONC [facility] HEMATOLOGY & ONCOLOGY  [address]  [place] [address] [phone]    Patient Name: Antwan Stevenson  Encounter Date: 08/27/2024  YOB: 1960  Patient Number: [id number]      REASON FOR FOLLOW-UP: Antwan Stevenson is a pleasant 64 y.o.  male who is seen on follow-up for rectal carcinoma.  He is seen C6D1 of neoadjuvant FOLFOX.    He is seen with sister, Cathryn and nurse Sanjana at the chemo suite. History is obtained from patient.  Patient is a reliable historian.        DIAGNOSTIC ABNORMALITIES:   He presented with hematochezia.  Patient seen by Jose Guadalupe Diez, APRN 3/20/2024.  He reports frequent rectal bleeding.  Patient scheduled for colonoscopy.  CT abdomen pelvis 4/11/2024.Bilateral nephrolithiasis without obstructive uropathy.   Cholelithiasis.   Left pelvic spigelian hernia containing a portion of sigmoid colon.  No bowel obstruction or inflammation. Fatty supraumbilical and bilateral inguinal hernias present as well.  Evidence of prior umbilical hernia repair.  Bibasilar pulmonary micronodules measuring up to 0.4 cm.  Chest CT follow-up in 12 months recommended unless shorter interval follow-up is warranted by history.   Colonoscopy 4/17/2024.  Anal mass. The colon was normal from the rectum to the mid transverse, as I had to use an endoscope due to the severe anal stenosis.  No specimens collected.  The digital rectal exam revealed a firm, hard and obstructing anal mass. The mass was circumferential. Findings: The entire examined colon appeared normal  Pathology report 4/18/2024. 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  Phone note 4/18/2024.  Patient referred to oncology and colorectal surgery at [place].  CEA at 2.1 on 4/24/2024.  Pelvic MRI report on 4/25/2024. 4.5 cm circular solid lower rectum/anal canal neoplasm without evidence of mucinous features. There is  more inferior extent involving the anal canal at the 12:00/12:30 position and 6:00/6:30 position with involvement of the most inferior aspect of the right external anal sphincter. At the level of the anorectal junction there is also posterior extramural extent of tumor into the intersphincteric space abutting and likely involving the external anal sphincter. Two indeterminate right mesorectal lymph nodes.   Note from Dr. Kaylie Stewart 5/1/2024. Plan for total neoadjuvant chemo.  CT chest report 5/3/2024. No definite evidence of metastatic disease in the chest.  Indeterminate 3 mm and 5 mm right lower lobe pulmonary nodules. Recommend a follow-up chest CT in 3 to 6 months to evaluate for stability. Hepatic steatosis. Cholelithiasis. Bilateral nonobstructing renal calculi.           PREVIOUS INTERVENTIONS:   He is on therapy with neoadjuvant FOLFOX 5/21/2024 through present.         Oncology/Hematology History   Rectal cancer   4/24/2024 Initial Diagnosis    Rectal cancer     5/21/2024 -  Chemotherapy    OP COLON [id number] OXALIplatin / Leucovorin / Fluorouracil         PAST MEDICAL HISTORY:  ALLERGIES:  No Known Allergies  CURRENT MEDICATIONS:  Outpatient Encounter Medications as of 8/27/2024   Medication Sig Dispense Refill    acetaminophen (Tylenol) 325 MG tablet Take 3 tablets by mouth Every 8 (Eight) Hours. Take every 8 hours for 3 days then take prn as needed.      albuterol sulfate  (90 Base) MCG/ACT inhaler Inhale 2 puffs Every 6 (Six) Hours As Needed.      HYDROcodone-acetaminophen (NORCO) 5-325 MG per tablet Take 1 tablet by mouth Every 6 (Six) Hours As Needed for Mild Pain or Moderate Pain. (Patient taking differently: Take 0.5 tablets by mouth At Night As Needed for Mild Pain or Moderate Pain (rectal pain from cancer).) 100 tablet 0    ibuprofen (ADVIL,MOTRIN) 200 MG tablet Take 2 tablets by mouth Every 6 (Six) Hours As Needed for Mild Pain.      lidocaine-prilocaine (EMLA) 2.5-2.5 % cream 30  minutes prior to treatment apply generous amount of Emla Cream to your port site and cover with clear plastic wrap until ready for use 30 g 2    loratadine (CLARITIN) 10 MG tablet Take 1 tablet by mouth Daily.      multivitamin (THERAGRAN) tablet tablet Take 1 tablet by mouth Daily.      ondansetron (Zofran) 4 MG tablet Take 1 tablet by mouth Every 8 (Eight) Hours As Needed for Nausea or Vomiting. 15 tablet 0    ondansetron (Zofran) 8 MG tablet Take 1 tablet by mouth Every 8 (Eight) Hours As Needed for Nausea or Vomiting. 60 tablet 2    predniSONE (DELTASONE) 5 MG tablet Take 1 tablet by mouth Daily.      prochlorperazine (COMPAZINE) 10 MG tablet Take 1 tablet by mouth Every 4 (Four) Hours As Needed for Nausea or Vomiting. 60 tablet 2    temazepam (RESTORIL) 7.5 MG capsule Take 1 capsule by mouth At Night As Needed for Sleep. HAS NOT STARTED THIS Rx      traMADol (ULTRAM) 50 MG tablet Take 1 tablet by mouth Every 6 (Six) Hours As Needed for Moderate Pain. 180 tablet 0    [DISCONTINUED] ibuprofen (Motrin IB) 200 MG tablet Take 3 tablets by mouth Every 8 (Eight) Hours. Take every 8 hours for three days then take as needed.      [DISCONTINUED] lidocaine-prilocaine (EMLA) 2.5-2.5 % cream 30 minute apply generous amount of Emla Cream to port site and cover with clear plastic wrap until ready for use 1 each 2     No facility-administered encounter medications on file as of 8/27/2024.     ADULT ILLNESSES:  Patient Active Problem List   Diagnosis Code    Rectal bleed K62.5    Rectal cancer C20    Port-A-Cath in place Z95.828     SURGERIES:  Past Surgical History:   Procedure Laterality Date    COLONOSCOPY N/A 4/17/2024    Procedure: COLONOSCOPY WITH ANESTHESIA;  Surgeon: Salty Arias DO;  Location: [facility] ENDOSCOPY;  Service: Gastroenterology;  Laterality: N/A;  preop; rectal bleed   postop questionable anal mass   PCP Duy Lomeli    HEMORROIDECTOMY      HERNIA REPAIR      VENOUS ACCESS DEVICE (PORT) INSERTION N/A  "5/16/2024    Procedure: Single Lumen Port-a-cath insertion with flouroscopy;  Surgeon: Rosa Mccall MD;  Location: [facility] OR;  Service: General;  Laterality: N/A;     HEALTH MAINTENANCE ITEMS:  Health Maintenance Due   Topic Date Due    BMI FOLLOWUP  Never done    TDAP/TD VACCINES (1 - Tdap) Never done    ZOSTER VACCINE (1 of 2) Never done    Pneumococcal Vaccine 65+ (1 of 1 - PCV) Never done    COVID-19 Vaccine (4 - 2023-24 season) 09/01/2023    HEPATITIS C SCREENING  Never done    ANNUAL WELLNESS VISIT  Never done    INFLUENZA VACCINE  08/01/2024       <no information>  Last Completed Colonoscopy            Discontinued - COLORECTAL CANCER SCREENING  Discontinued        Frequency changed to Never automatically (Topic No Longer Applies)    05/01/2024  Outside Procedure: COLONOSCOPY    04/17/2024  Colonoscopy    04/17/2024  Surgical Procedure: COLONOSCOPY                  Immunization History   Administered Date(s) Administered    COVID-19 (MODERNA) 1st,2nd,3rd Dose Monovalent 04/22/2021, 05/24/2021    COVID-19 (MODERNA) Monovalent Original Booster 01/04/2022     Last Completed Mammogram       This patient has no relevant Health Maintenance data.              FAMILY HISTORY:  Family History   Problem Relation Age of Onset    Colon cancer Neg Hx     Colon polyps Neg Hx     Esophageal cancer Neg Hx      SOCIAL HISTORY:  Social History     Socioeconomic History    Marital status:    Tobacco Use    Smoking status: Never    Smokeless tobacco: Never   Vaping Use    Vaping status: Never Used   Substance and Sexual Activity    Alcohol use: Never    Drug use: Never    Sexual activity: Defer       REVIEW OF SYSTEMS:    Review of Systems   Constitutional:  Positive for fatigue. Negative for chills and fever.        \"I am tired.\"   HENT:  Negative for congestion and mouth sores.    Eyes:  Negative for discharge and redness.   Respiratory:  Negative for shortness of breath and wheezing.    Cardiovascular:  " "Negative for chest pain and palpitations.   Gastrointestinal:  Negative for constipation, diarrhea, nausea and vomiting.   Endocrine: Negative for cold intolerance and heat intolerance.   Genitourinary:  Negative for dysuria and hematuria.   Musculoskeletal:  Negative for gait problem and myalgias.   Skin:  Positive for pallor.   Allergic/Immunologic: Negative for food allergies.   Neurological:  Negative for speech difficulty and weakness.   Hematological:  Negative for adenopathy. Does not bruise/bleed easily.   Psychiatric/Behavioral:  Negative for agitation, confusion and hallucinations.        VITAL SIGNS: /72   Pulse 98   Temp 97 °F (36.1 °C)   Resp 18   Ht 167.6 cm (65.98\")   Wt 105 kg (232 lb)   SpO2 98%   BMI 37.47 kg/m²   Pain Score    08/27/24 0846   PainSc:   2       PHYSICAL EXAMINATION:     Physical Exam  Vitals reviewed.   Constitutional:       General: He is not in acute distress.  HENT:      Head: Normocephalic and atraumatic.   Eyes:      General: No scleral icterus.  Cardiovascular:      Rate and Rhythm: Normal rate.   Pulmonary:      Effort: No respiratory distress.      Breath sounds: No wheezing.      Comments: Port, right. No erythema.  Abdominal:      General: Bowel sounds are normal.      Palpations: Abdomen is soft.      Tenderness: There is no abdominal tenderness.   Musculoskeletal:         General: No swelling.      Cervical back: Neck supple.   Skin:     Coloration: Skin is pale.   Neurological:      Mental Status: He is alert and oriented to person, place, and time.   Psychiatric:         Mood and Affect: Mood normal.         Behavior: Behavior normal.         Thought Content: Thought content normal.         Judgment: Judgment normal.         LABS    Lab Results - Last 18 Months   Lab Units 08/27/24  0812 08/13/24  0818 08/06/24  0924 07/30/24  0810 07/22/24  0740 07/16/24  1459 07/09/24  0821 04/24/24  1406 02/29/24  0859   HEMOGLOBIN g/dL 12.2* 12.5* 12.7* 12.0* 13.6 " 12.1* 12.4*   < > 14.2   HEMATOCRIT % 39.6 40.0 40.9 37.6 44.1 38.1 38.1   < > 46.0   MCV fL 84.4 84.7 83.5 82.3 84.2 82.3 80.9   < > 84.4   WBC 10*3/mm3 3.86 5.64 5.47 4.92 9.70 49.68* 5.00   < > 6.2   RDW % 15.4 16.2* 16.2* 16.8* 17.2* 15.7* 15.3   < > 13.8   MPV fL 9.6 9.6 9.3 9.4 10.6 10.1 9.4   < > 10.1   PLATELETS 10*3/mm3 105* 190 144 176 60* 108* 118*   < > 298   NEUTROS ABS 10*3/mm3 1.62* 1.88 1.26* 1.14* 7.15* 42.72* 2.04   < > 3.5   LYMPHS ABS K/uL  --   --   --   --   --   --   --   --  1.6   MONOS ABS K/uL  --   --   --   --   --   --   --   --  1.00*   EOS ABS 10*3/mm3 0.00 0.23 0.05 0.00 0.10  --  0.00   < > 0.06   BASOS ABS 10*3/mm3 0.00 0.00 0.00 0.00 0.10  --  0.05   < > 0.10   IMMATURE GRANS (ABS) K/uL  --   --   --   --   --   --   --   --  0.0   NRBC /100 WBC  --   --   --   --  1.0*  --   --   --   --    NEUTROPHIL % % 41.9* 32.3* 23.0* 22.2* 70.7 82.0* 40.8*   < >  --    MONOCYTES % % 10.5 17.2* 20.0* 13.1* 7.1 7.0 26.5*   < >  --    BASOPHIL % % 0.0 0.0 0.0 0.0 1.0  --  1.0   < >  --    ATYP LYMPH % % 3.5 1.0 7.0* 11.1* 3.0  --  10.2*   < >  --    ANISOCYTOSIS  Slight/1+  --  Slight/1+ Mod/2+ Slight/1+  --   --   --   --     < > = values in this interval not displayed.       Lab Results - Last 18 Months   Lab Units 08/27/24  0812 08/13/24  0818 08/06/24  0924 07/30/24  0810 07/22/24  0740 07/16/24  1459   GLUCOSE mg/dL 136* 95 130* 86 145* 123*   SODIUM mmol/L 139 141 138 140 137 141   POTASSIUM mmol/L 3.9 4.6 3.8 4.2 4.3 4.4   CO2 mmol/L 25.0 27.0 24.0 23.0 24.0 26.0   CHLORIDE mmol/L 105 104 104 106 100 105   ANION GAP mmol/L 9.0 10.0 10.0 11.0 13.0 10.0   CREATININE mg/dL 0.92 0.92 0.81 0.83 1.20 1.04   BUN mg/dL 7* 9 8 7* 13 12   BUN / CREAT RATIO  7.6 9.8 9.9 8.4 10.8 11.5   CALCIUM mg/dL 8.8 9.4 9.2 9.0 9.3 9.0   ALK PHOS U/L 98 106 107 117 220* 444*   TOTAL PROTEIN g/dL 7.2 7.7 7.9 7.1 7.8 7.5   ALT (SGPT) U/L 14 17 17 23 24 29   AST (SGOT) U/L 26 24 26 32 38 48*   BILIRUBIN mg/dL 0.4  "0.6 0.4 0.4 0.7 0.2   ALBUMIN g/dL 3.8 4.1 3.9 3.8 3.9 4.0   GLOBULIN gm/dL 3.4 3.6 4.0 3.3 3.9 3.5       Lab Results - Last 18 Months   Lab Units 04/24/24  1406   CEA ng/mL 2.10       Lab Results - Last 18 Months   Lab Units 07/16/24  1459 06/18/24  0744 06/04/24  0722 02/29/24  0859   IRON mcg/dL 29*  --  97  --    TIBC mcg/dL 335  --  328  --    IRON SATURATION (TSAT) % 9*  --  30  --    FERRITIN ng/mL 1,735.00*  --  263.00  --    TSH uIU/mL  --   --   --  2.460   FOLATE ng/mL  --  >20.00  --   --        Antwan Stevenson reports a pain score of 2.  Given his pain assessment as noted, treatment options were discussed and the following options were decided upon as a follow-up plan to address the patient's pain: continuation of current treatment plan for pain.      ASSESSMENT:  Adenocarcinoma the rectum. KIKE on 4/28/2024.  AJCC stage:IIIb (cT3, cN1b, cM0)  Treatment status: He is on neoadjuvant FOLFOX from 5/21/2024 through present.  To be followed by CIV 5FU with radiation.   2.  Performance status of 1.  3.  Grade 2 neutropenia without fever from chemo.  He is on pegfilgrastim as prophylaxis.  4.  Normocytic anemia secondary to chemo.  5.  ?Interstitial granulomatous dermatitis with arthritis. On prednisone 5 mg daily.    6.  Elevated liver function test. Await CT abdomen.  7.  Neutrophilia from Neulasta.  8.  Bibasilar pulmonary micronodules measuring up to 0.4 cm on 4/11/2024.  On observation.  9.  Thrombocytopenia from chemo. Observe.          PLAN:   Re: Tolerance to neoadjuvant FOLFOX.  \"Tired and cold sensitivity.\"  2.   Re:  Heme status.  WBC 3.8, ANC 1.62, hemoglobin 12.2, hematocrit 39.6, MCV 84.4 and platelet 105.  3.   Re:  CMP. GFR AST 26 and alkaline phosphatase 98.  4.   Re:  Magnesium 2.  5.   Re:  Ferritin 1735. Iron saturation 9%. Anemia of chronic disease/chemo.  6.   Re:  CT abdomen and pelvis 8/2/2024.  No evidence of hepatic metastases is identified. Fatty " infiltration of the liver is present. 10 mm gallstone at the gallbladder neck without evidence of cholecystitis.  Soft tissue thickening at the anus asymmetric on the right, likely corresponds with the known anorectal neoplasm. No intra-abdominal or pelvic lymphadenopathy is identified. Large left-sided spigelian hernia with a length of 18 cm, transverse diameter of 4.4 cm, this contains fat and a loop of the descending  colon. The colon is not obstructed or thickened. Large fat-containing right inguinal hernia with a diameter of 7.3 cm in the scrotum. Small 2.3 cm periumbilical ventral hernia to the right of midline.  7.  Continue with total neoadjuvant FOLFOX if ANC at least 1.5.  Then he will undergo neoadjuvant CIV 5-FU with radiation.   8.  Plan to complete 12 cycles of neoadjuvant FOLFOX:  9.  Monitor for infusion reactions, anaphylaxis, nausea, vomiting, diarrhea, cold intolerance, myelosuppression, alopecia or neuropathy.  10.  Schedule treatment C6 D1 to 3 on 8/27/2024 and C7 D1 to D3 on 9/10/2024.  To be administered every 2 weeks: Oxaliplatin 85 mg/m2 IVPB.  Leucovorin 400 mg/m2.  5- mg/ m2  IVP.  Begin 5-FU 2,400 mg/m2 IVPB over 46 hours.    11.    Premedicate with:  Aloxi 0.25 mg IVP.  Emend 150 mg IV.  Decadron 12 mg IVPB over 20-30 min.  12.  PEG filgrastim D3 as primary prophylaxis.  Cycle 3 on 6/18/2024 delayed due to ANC of 1.2.  Monitor for arthralgias.  13.   Weekly CBC with differential, magnesium, and CMP with FOLFOX.  14.  eRx ondansetron 8 mg p.o. every 8 hours as needed for nausea and vomiting #60, 2 refills if needed.  15.  eRx prochlorperazine 10 mg p.o. every 4 hours as needed for nausea and vomiting #60, 2 refills if needed.  16.  eRx tramadol 50 mg p.o. every 6 hours as needed for pain, #180, 0 refill if needed.  17.  Continue care per primary care physician and the other specialists.  18.  Plan of care discussed with patient, sister and nurse Sanjana.  Understanding expressed.  The  patient is agreeable to proceed.  19.  Advance Care Planning  ACP discussion was declined by the patient. Patient does not have an advance directive, information provided.    20. Return to office in 4 weeks. Plan to repeat MRI pelvis and CT chest after neoadjuvant chemo.          I have reviewed the assessment and plan and verified the accuracy of it. No changes to assessment and plan since the information was documented. Raul Mayberry MD 08/27/24         I spent 32 total minutes, face-to-face, caring for Antwna today. Greater than 50% of this time involved counseling and/or coordination of care as documented within this note.              (Salty Arias DO)  (Kaylie Stewart MD)   (Marty Benitez MD radiation oncology [place] [facility].  (Bruno Sanford MD)  (Duy Lomeli MD)

## 2024-08-27 ENCOUNTER — LAB (OUTPATIENT)
Dept: LAB | Facility: HOSPITAL | Age: 64
End: 2024-08-27
Payer: MEDICARE

## 2024-08-27 ENCOUNTER — INFUSION (OUTPATIENT)
Dept: ONCOLOGY | Facility: HOSPITAL | Age: 64
End: 2024-08-27
Payer: MEDICARE

## 2024-08-27 ENCOUNTER — OFFICE VISIT (OUTPATIENT)
Dept: ONCOLOGY | Facility: CLINIC | Age: 64
End: 2024-08-27
Payer: MEDICARE

## 2024-08-27 VITALS
WEIGHT: 232 LBS | DIASTOLIC BLOOD PRESSURE: 66 MMHG | BODY MASS INDEX: 37.28 KG/M2 | SYSTOLIC BLOOD PRESSURE: 145 MMHG | HEART RATE: 74 BPM | TEMPERATURE: 96.6 F | HEIGHT: 66 IN | OXYGEN SATURATION: 99 % | RESPIRATION RATE: 17 BRPM

## 2024-08-27 VITALS
RESPIRATION RATE: 18 BRPM | DIASTOLIC BLOOD PRESSURE: 72 MMHG | SYSTOLIC BLOOD PRESSURE: 127 MMHG | HEART RATE: 98 BPM | BODY MASS INDEX: 37.28 KG/M2 | HEIGHT: 66 IN | OXYGEN SATURATION: 98 % | TEMPERATURE: 97 F | WEIGHT: 232 LBS

## 2024-08-27 DIAGNOSIS — Z95.828 PORT-A-CATH IN PLACE: ICD-10-CM

## 2024-08-27 DIAGNOSIS — C20 RECTAL CANCER: Primary | ICD-10-CM

## 2024-08-27 DIAGNOSIS — C20 RECTAL CANCER: ICD-10-CM

## 2024-08-27 DIAGNOSIS — K62.5 RECTAL BLEED: Primary | ICD-10-CM

## 2024-08-27 LAB
ALBUMIN SERPL-MCNC: 3.8 G/DL (ref 3.5–5.2)
ALBUMIN/GLOB SERPL: 1.1 G/DL
ALP SERPL-CCNC: 98 U/L (ref 39–117)
ALT SERPL W P-5'-P-CCNC: 14 U/L (ref 1–41)
ANION GAP SERPL CALCULATED.3IONS-SCNC: 9 MMOL/L (ref 5–15)
ANISOCYTOSIS BLD QL: ABNORMAL
AST SERPL-CCNC: 26 U/L (ref 1–40)
BASOPHILS # BLD MANUAL: 0 10*3/MM3 (ref 0–0.2)
BASOPHILS NFR BLD MANUAL: 0 % (ref 0–1.5)
BILIRUB SERPL-MCNC: 0.4 MG/DL (ref 0–1.2)
BUN SERPL-MCNC: 7 MG/DL (ref 8–23)
BUN/CREAT SERPL: 7.6 (ref 7–25)
CALCIUM SPEC-SCNC: 8.8 MG/DL (ref 8.6–10.5)
CHLORIDE SERPL-SCNC: 105 MMOL/L (ref 98–107)
CO2 SERPL-SCNC: 25 MMOL/L (ref 22–29)
CREAT SERPL-MCNC: 0.92 MG/DL (ref 0.76–1.27)
DEPRECATED RDW RBC AUTO: 46.9 FL (ref 37–54)
EGFRCR SERPLBLD CKD-EPI 2021: 91.7 ML/MIN/1.73
EOSINOPHIL # BLD MANUAL: 0 10*3/MM3 (ref 0–0.4)
EOSINOPHIL NFR BLD MANUAL: 0 % (ref 0.3–6.2)
ERYTHROCYTE [DISTWIDTH] IN BLOOD BY AUTOMATED COUNT: 15.4 % (ref 12.3–15.4)
GLOBULIN UR ELPH-MCNC: 3.4 GM/DL
GLUCOSE SERPL-MCNC: 136 MG/DL (ref 65–99)
HCT VFR BLD AUTO: 39.6 % (ref 37.5–51)
HGB BLD-MCNC: 12.2 G/DL (ref 13–17.7)
HOLD SPECIMEN: NORMAL
LYMPHOCYTES # BLD MANUAL: 1.84 10*3/MM3 (ref 0.7–3.1)
LYMPHOCYTES NFR BLD MANUAL: 10.5 % (ref 5–12)
MAGNESIUM SERPL-MCNC: 2 MG/DL (ref 1.6–2.4)
MCH RBC QN AUTO: 26 PG (ref 26.6–33)
MCHC RBC AUTO-ENTMCNC: 30.8 G/DL (ref 31.5–35.7)
MCV RBC AUTO: 84.4 FL (ref 79–97)
MONOCYTES # BLD: 0.41 10*3/MM3 (ref 0.1–0.9)
NEUTROPHILS # BLD AUTO: 1.62 10*3/MM3 (ref 1.7–7)
NEUTROPHILS NFR BLD MANUAL: 41.9 % (ref 42.7–76)
PLATELET # BLD AUTO: 105 10*3/MM3 (ref 140–450)
PMV BLD AUTO: 9.6 FL (ref 6–12)
POIKILOCYTOSIS BLD QL SMEAR: ABNORMAL
POTASSIUM SERPL-SCNC: 3.9 MMOL/L (ref 3.5–5.2)
PROT SERPL-MCNC: 7.2 G/DL (ref 6–8.5)
RBC # BLD AUTO: 4.69 10*6/MM3 (ref 4.14–5.8)
SMALL PLATELETS BLD QL SMEAR: ABNORMAL
SODIUM SERPL-SCNC: 139 MMOL/L (ref 136–145)
TARGETS BLD QL SMEAR: ABNORMAL
VARIANT LYMPHS NFR BLD MANUAL: 3.5 % (ref 0–5)
VARIANT LYMPHS NFR BLD MANUAL: 44.2 % (ref 19.6–45.3)
WBC MORPH BLD: NORMAL
WBC NRBC COR # BLD AUTO: 3.86 10*3/MM3 (ref 3.4–10.8)

## 2024-08-27 PROCEDURE — 85025 COMPLETE CBC W/AUTO DIFF WBC: CPT

## 2024-08-27 PROCEDURE — G0498 CHEMO EXTEND IV INFUS W/PUMP: HCPCS

## 2024-08-27 PROCEDURE — 25010000002 PALONOSETRON PER 25 MCG: Performed by: INTERNAL MEDICINE

## 2024-08-27 PROCEDURE — 25010000002 DEXAMETHASONE SODIUM PHOSPHATE 100 MG/10ML SOLUTION: Performed by: INTERNAL MEDICINE

## 2024-08-27 PROCEDURE — 96415 CHEMO IV INFUSION ADDL HR: CPT

## 2024-08-27 PROCEDURE — 25010000002 FOSAPREPITANT PER 1 MG: Performed by: INTERNAL MEDICINE

## 2024-08-27 PROCEDURE — 96368 THER/DIAG CONCURRENT INF: CPT

## 2024-08-27 PROCEDURE — 83735 ASSAY OF MAGNESIUM: CPT

## 2024-08-27 PROCEDURE — 1160F RVW MEDS BY RX/DR IN RCRD: CPT | Performed by: INTERNAL MEDICINE

## 2024-08-27 PROCEDURE — 96375 TX/PRO/DX INJ NEW DRUG ADDON: CPT

## 2024-08-27 PROCEDURE — 96413 CHEMO IV INFUSION 1 HR: CPT

## 2024-08-27 PROCEDURE — 96367 TX/PROPH/DG ADDL SEQ IV INF: CPT

## 2024-08-27 PROCEDURE — 1125F AMNT PAIN NOTED PAIN PRSNT: CPT | Performed by: INTERNAL MEDICINE

## 2024-08-27 PROCEDURE — 96411 CHEMO IV PUSH ADDL DRUG: CPT

## 2024-08-27 PROCEDURE — 25010000002 OXALIPLATIN PER 0.5 MG: Performed by: INTERNAL MEDICINE

## 2024-08-27 PROCEDURE — 1159F MED LIST DOCD IN RCRD: CPT | Performed by: INTERNAL MEDICINE

## 2024-08-27 PROCEDURE — 36415 COLL VENOUS BLD VENIPUNCTURE: CPT

## 2024-08-27 PROCEDURE — 80053 COMPREHEN METABOLIC PANEL: CPT

## 2024-08-27 PROCEDURE — 85007 BL SMEAR W/DIFF WBC COUNT: CPT

## 2024-08-27 PROCEDURE — 96366 THER/PROPH/DIAG IV INF ADDON: CPT

## 2024-08-27 PROCEDURE — 25010000002 FLUOROURACIL PER 500 MG: Performed by: INTERNAL MEDICINE

## 2024-08-27 PROCEDURE — 0 DEXTROSE 5 % SOLUTION: Performed by: INTERNAL MEDICINE

## 2024-08-27 PROCEDURE — 0 DEXTROSE 5 % SOLUTION 250 ML FLEX CONT: Performed by: INTERNAL MEDICINE

## 2024-08-27 PROCEDURE — 25010000002 LEUCOVORIN 200 MG RECONSTITUTED SOLUTION 1 EACH VIAL: Performed by: INTERNAL MEDICINE

## 2024-08-27 PROCEDURE — 25810000003 SODIUM CHLORIDE 0.9 % SOLUTION 250 ML FLEX CONT: Performed by: INTERNAL MEDICINE

## 2024-08-27 PROCEDURE — 99214 OFFICE O/P EST MOD 30 MIN: CPT | Performed by: INTERNAL MEDICINE

## 2024-08-27 PROCEDURE — 25010000002 LEUCOVORIN CALCIUM PER 50 MG: Performed by: INTERNAL MEDICINE

## 2024-08-27 RX ORDER — DEXTROSE MONOHYDRATE 50 MG/ML
20 INJECTION, SOLUTION INTRAVENOUS ONCE
Status: COMPLETED | OUTPATIENT
Start: 2024-08-27 | End: 2024-08-27

## 2024-08-27 RX ORDER — DIPHENHYDRAMINE HYDROCHLORIDE 50 MG/ML
50 INJECTION INTRAMUSCULAR; INTRAVENOUS AS NEEDED
Status: DISCONTINUED | OUTPATIENT
Start: 2024-08-27 | End: 2024-08-27 | Stop reason: HOSPADM

## 2024-08-27 RX ORDER — PALONOSETRON 0.05 MG/ML
0.25 INJECTION, SOLUTION INTRAVENOUS ONCE
Status: COMPLETED | OUTPATIENT
Start: 2024-08-27 | End: 2024-08-27

## 2024-08-27 RX ORDER — FAMOTIDINE 10 MG/ML
20 INJECTION, SOLUTION INTRAVENOUS AS NEEDED
Status: DISCONTINUED | OUTPATIENT
Start: 2024-08-27 | End: 2024-08-27 | Stop reason: HOSPADM

## 2024-08-27 RX ORDER — FLUOROURACIL 50 MG/ML
400 INJECTION, SOLUTION INTRAVENOUS ONCE
Status: COMPLETED | OUTPATIENT
Start: 2024-08-27 | End: 2024-08-27

## 2024-08-27 RX ADMIN — DEXAMETHASONE SODIUM PHOSPHATE 12 MG: 10 INJECTION, SOLUTION INTRAMUSCULAR; INTRAVENOUS at 09:10

## 2024-08-27 RX ADMIN — FLUOROURACIL 860 MG: 50 INJECTION, SOLUTION INTRAVENOUS at 12:31

## 2024-08-27 RX ADMIN — PALONOSETRON HYDROCHLORIDE 0.25 MG: 0.25 INJECTION, SOLUTION INTRAVENOUS at 09:08

## 2024-08-27 RX ADMIN — DEXTROSE MONOHYDRATE 20 ML/HR: 50 INJECTION, SOLUTION INTRAVENOUS at 09:07

## 2024-08-27 RX ADMIN — LEUCOVORIN CALCIUM 860 MG: 350 INJECTION, POWDER, LYOPHILIZED, FOR SUSPENSION INTRAMUSCULAR; INTRAVENOUS at 10:04

## 2024-08-27 RX ADMIN — FOSAPREPITANT 150 MG: 150 INJECTION, POWDER, LYOPHILIZED, FOR SOLUTION INTRAVENOUS at 09:30

## 2024-08-27 RX ADMIN — OXALIPLATIN 185 MG: 5 INJECTION, SOLUTION INTRAVENOUS at 10:04

## 2024-08-27 RX ADMIN — FLUOROURACIL 5160 MG: 50 INJECTION, SOLUTION INTRAVENOUS at 12:41

## 2024-08-29 ENCOUNTER — INFUSION (OUTPATIENT)
Dept: ONCOLOGY | Facility: HOSPITAL | Age: 64
End: 2024-08-29
Payer: MEDICARE

## 2024-08-29 VITALS
DIASTOLIC BLOOD PRESSURE: 52 MMHG | HEART RATE: 54 BPM | SYSTOLIC BLOOD PRESSURE: 120 MMHG | OXYGEN SATURATION: 99 % | RESPIRATION RATE: 16 BRPM | TEMPERATURE: 96.9 F

## 2024-08-29 DIAGNOSIS — Z95.828 PORT-A-CATH IN PLACE: Primary | ICD-10-CM

## 2024-08-29 DIAGNOSIS — C20 RECTAL CANCER: ICD-10-CM

## 2024-08-29 PROCEDURE — 25010000002 HEPARIN LOCK FLUSH PER 10 UNITS: Performed by: INTERNAL MEDICINE

## 2024-08-29 RX ORDER — HEPARIN SODIUM (PORCINE) LOCK FLUSH IV SOLN 100 UNIT/ML 100 UNIT/ML
500 SOLUTION INTRAVENOUS AS NEEDED
OUTPATIENT
Start: 2024-08-29

## 2024-08-29 RX ORDER — SODIUM CHLORIDE 0.9 % (FLUSH) 0.9 %
10 SYRINGE (ML) INJECTION AS NEEDED
OUTPATIENT
Start: 2024-08-29

## 2024-08-29 RX ORDER — HEPARIN SODIUM (PORCINE) LOCK FLUSH IV SOLN 100 UNIT/ML 100 UNIT/ML
500 SOLUTION INTRAVENOUS AS NEEDED
Status: DISCONTINUED | OUTPATIENT
Start: 2024-08-29 | End: 2024-08-29 | Stop reason: HOSPADM

## 2024-08-29 RX ORDER — SODIUM CHLORIDE 0.9 % (FLUSH) 0.9 %
10 SYRINGE (ML) INJECTION AS NEEDED
Status: DISCONTINUED | OUTPATIENT
Start: 2024-08-29 | End: 2024-08-29 | Stop reason: HOSPADM

## 2024-08-29 RX ADMIN — HEPARIN 500 UNITS: 100 SYRINGE at 10:35

## 2024-08-29 RX ADMIN — Medication 10 ML: at 10:35

## 2024-09-04 DIAGNOSIS — C20 RECTAL CANCER: Primary | ICD-10-CM

## 2024-09-04 DIAGNOSIS — Z95.828 PORT-A-CATH IN PLACE: ICD-10-CM

## 2024-09-04 RX ORDER — PALONOSETRON 0.05 MG/ML
0.25 INJECTION, SOLUTION INTRAVENOUS ONCE
OUTPATIENT
Start: 2024-09-10

## 2024-09-04 RX ORDER — DIPHENHYDRAMINE HYDROCHLORIDE 50 MG/ML
50 INJECTION INTRAMUSCULAR; INTRAVENOUS AS NEEDED
OUTPATIENT
Start: 2024-09-10

## 2024-09-04 RX ORDER — FAMOTIDINE 10 MG/ML
20 INJECTION, SOLUTION INTRAVENOUS AS NEEDED
OUTPATIENT
Start: 2024-09-10

## 2024-09-04 RX ORDER — FLUOROURACIL 50 MG/ML
400 INJECTION, SOLUTION INTRAVENOUS ONCE
OUTPATIENT
Start: 2024-09-10

## 2024-09-04 RX ORDER — DEXTROSE MONOHYDRATE 50 MG/ML
20 INJECTION, SOLUTION INTRAVENOUS ONCE
OUTPATIENT
Start: 2024-09-10

## 2024-09-10 ENCOUNTER — TELEPHONE (OUTPATIENT)
Dept: ONCOLOGY | Facility: CLINIC | Age: 64
End: 2024-09-10
Payer: MEDICARE

## 2024-09-10 ENCOUNTER — LAB (OUTPATIENT)
Dept: LAB | Facility: HOSPITAL | Age: 64
End: 2024-09-10
Payer: MEDICARE

## 2024-09-10 ENCOUNTER — INFUSION (OUTPATIENT)
Dept: ONCOLOGY | Facility: HOSPITAL | Age: 64
End: 2024-09-10
Payer: MEDICARE

## 2024-09-10 VITALS
OXYGEN SATURATION: 99 % | RESPIRATION RATE: 18 BRPM | DIASTOLIC BLOOD PRESSURE: 71 MMHG | TEMPERATURE: 96.7 F | WEIGHT: 231.2 LBS | HEIGHT: 66 IN | BODY MASS INDEX: 37.16 KG/M2 | SYSTOLIC BLOOD PRESSURE: 136 MMHG | HEART RATE: 90 BPM

## 2024-09-10 DIAGNOSIS — C20 RECTAL CANCER: ICD-10-CM

## 2024-09-10 DIAGNOSIS — Z95.828 PORT-A-CATH IN PLACE: ICD-10-CM

## 2024-09-10 LAB
ALBUMIN SERPL-MCNC: 4 G/DL (ref 3.5–5.2)
ALBUMIN/GLOB SERPL: 1.1 G/DL
ALP SERPL-CCNC: 101 U/L (ref 39–117)
ALT SERPL W P-5'-P-CCNC: 19 U/L (ref 1–41)
ANION GAP SERPL CALCULATED.3IONS-SCNC: 7 MMOL/L (ref 5–15)
ANISOCYTOSIS BLD QL: ABNORMAL
AST SERPL-CCNC: 30 U/L (ref 1–40)
BASOPHILS # BLD MANUAL: 0 10*3/MM3 (ref 0–0.2)
BASOPHILS NFR BLD MANUAL: 0 % (ref 0–1.5)
BILIRUB SERPL-MCNC: 0.7 MG/DL (ref 0–1.2)
BUN SERPL-MCNC: 12 MG/DL (ref 8–23)
BUN/CREAT SERPL: 11.9 (ref 7–25)
CALCIUM SPEC-SCNC: 9.2 MG/DL (ref 8.6–10.5)
CHLORIDE SERPL-SCNC: 104 MMOL/L (ref 98–107)
CO2 SERPL-SCNC: 28 MMOL/L (ref 22–29)
CREAT SERPL-MCNC: 1.01 MG/DL (ref 0.76–1.27)
DEPRECATED RDW RBC AUTO: 47.9 FL (ref 37–54)
EGFRCR SERPLBLD CKD-EPI 2021: 82 ML/MIN/1.73
EOSINOPHIL # BLD MANUAL: 0 10*3/MM3 (ref 0–0.4)
EOSINOPHIL NFR BLD MANUAL: 0 % (ref 0.3–6.2)
ERYTHROCYTE [DISTWIDTH] IN BLOOD BY AUTOMATED COUNT: 16 % (ref 12.3–15.4)
GLOBULIN UR ELPH-MCNC: 3.8 GM/DL
GLUCOSE SERPL-MCNC: 98 MG/DL (ref 65–99)
HCT VFR BLD AUTO: 41.4 % (ref 37.5–51)
HGB BLD-MCNC: 12.9 G/DL (ref 13–17.7)
LYMPHOCYTES # BLD MANUAL: 3.21 10*3/MM3 (ref 0.7–3.1)
LYMPHOCYTES NFR BLD MANUAL: 21.2 % (ref 5–12)
MAGNESIUM SERPL-MCNC: 2.2 MG/DL (ref 1.6–2.4)
MCH RBC QN AUTO: 25.8 PG (ref 26.6–33)
MCHC RBC AUTO-ENTMCNC: 31.2 G/DL (ref 31.5–35.7)
MCV RBC AUTO: 82.8 FL (ref 79–97)
MONOCYTES # BLD: 1.22 10*3/MM3 (ref 0.1–0.9)
NEUTROPHILS # BLD AUTO: 1.34 10*3/MM3 (ref 1.7–7)
NEUTROPHILS NFR BLD MANUAL: 23.2 % (ref 42.7–76)
PLATELET # BLD AUTO: 93 10*3/MM3 (ref 140–450)
PMV BLD AUTO: 9.4 FL (ref 6–12)
POIKILOCYTOSIS BLD QL SMEAR: ABNORMAL
POTASSIUM SERPL-SCNC: 4.6 MMOL/L (ref 3.5–5.2)
PROT SERPL-MCNC: 7.8 G/DL (ref 6–8.5)
RBC # BLD AUTO: 5 10*6/MM3 (ref 4.14–5.8)
SMALL PLATELETS BLD QL SMEAR: ABNORMAL
SODIUM SERPL-SCNC: 139 MMOL/L (ref 136–145)
STOMATOCYTES BLD QL SMEAR: ABNORMAL
VARIANT LYMPHS NFR BLD MANUAL: 10.1 % (ref 0–5)
VARIANT LYMPHS NFR BLD MANUAL: 45.5 % (ref 19.6–45.3)
WBC MORPH BLD: NORMAL
WBC NRBC COR # BLD AUTO: 5.77 10*3/MM3 (ref 3.4–10.8)

## 2024-09-10 PROCEDURE — 85025 COMPLETE CBC W/AUTO DIFF WBC: CPT

## 2024-09-10 PROCEDURE — 83735 ASSAY OF MAGNESIUM: CPT

## 2024-09-10 PROCEDURE — G0463 HOSPITAL OUTPT CLINIC VISIT: HCPCS

## 2024-09-10 PROCEDURE — 80053 COMPREHEN METABOLIC PANEL: CPT

## 2024-09-10 PROCEDURE — 36415 COLL VENOUS BLD VENIPUNCTURE: CPT

## 2024-09-10 PROCEDURE — 85007 BL SMEAR W/DIFF WBC COUNT: CPT

## 2024-09-10 NOTE — PROGRESS NOTES
Pt here for Folfox.  ANC 1.34, plts 93. Per Dr. Mayberry hold tx today and move out 1 week. Pt v/u. S. STAN Ramirez

## 2024-09-16 DIAGNOSIS — Z95.828 PORT-A-CATH IN PLACE: ICD-10-CM

## 2024-09-16 DIAGNOSIS — C20 RECTAL CANCER: Primary | ICD-10-CM

## 2024-09-17 ENCOUNTER — LAB (OUTPATIENT)
Dept: LAB | Facility: HOSPITAL | Age: 64
End: 2024-09-17
Payer: MEDICARE

## 2024-09-17 ENCOUNTER — INFUSION (OUTPATIENT)
Dept: ONCOLOGY | Facility: HOSPITAL | Age: 64
End: 2024-09-17
Payer: MEDICARE

## 2024-09-17 VITALS
RESPIRATION RATE: 18 BRPM | HEIGHT: 66 IN | WEIGHT: 236.6 LBS | OXYGEN SATURATION: 97 % | BODY MASS INDEX: 38.02 KG/M2 | TEMPERATURE: 97.2 F | HEART RATE: 89 BPM | DIASTOLIC BLOOD PRESSURE: 73 MMHG | SYSTOLIC BLOOD PRESSURE: 153 MMHG

## 2024-09-17 DIAGNOSIS — Z95.828 PORT-A-CATH IN PLACE: ICD-10-CM

## 2024-09-17 DIAGNOSIS — C20 RECTAL CANCER: ICD-10-CM

## 2024-09-17 DIAGNOSIS — C20 RECTAL CANCER: Primary | ICD-10-CM

## 2024-09-17 LAB
ALBUMIN SERPL-MCNC: 3.8 G/DL (ref 3.5–5.2)
ALBUMIN/GLOB SERPL: 1.2 G/DL
ALP SERPL-CCNC: 90 U/L (ref 39–117)
ALT SERPL W P-5'-P-CCNC: 14 U/L (ref 1–41)
ANION GAP SERPL CALCULATED.3IONS-SCNC: 8 MMOL/L (ref 5–15)
ANISOCYTOSIS BLD QL: ABNORMAL
AST SERPL-CCNC: 27 U/L (ref 1–40)
BASOPHILS # BLD MANUAL: 0 10*3/MM3 (ref 0–0.2)
BASOPHILS NFR BLD MANUAL: 0 % (ref 0–1.5)
BILIRUB SERPL-MCNC: 0.3 MG/DL (ref 0–1.2)
BUN SERPL-MCNC: 8 MG/DL (ref 8–23)
BUN/CREAT SERPL: 11.1 (ref 7–25)
CALCIUM SPEC-SCNC: 9.4 MG/DL (ref 8.6–10.5)
CHLORIDE SERPL-SCNC: 107 MMOL/L (ref 98–107)
CO2 SERPL-SCNC: 27 MMOL/L (ref 22–29)
CREAT SERPL-MCNC: 0.72 MG/DL (ref 0.76–1.27)
DEPRECATED RDW RBC AUTO: 50.1 FL (ref 37–54)
EGFRCR SERPLBLD CKD-EPI 2021: 100.8 ML/MIN/1.73
EOSINOPHIL # BLD MANUAL: 0 10*3/MM3 (ref 0–0.4)
EOSINOPHIL NFR BLD MANUAL: 0 % (ref 0.3–6.2)
ERYTHROCYTE [DISTWIDTH] IN BLOOD BY AUTOMATED COUNT: 15.9 % (ref 12.3–15.4)
GLOBULIN UR ELPH-MCNC: 3.3 GM/DL
GLUCOSE SERPL-MCNC: 97 MG/DL (ref 65–99)
HCT VFR BLD AUTO: 39.7 % (ref 37.5–51)
HGB BLD-MCNC: 12.2 G/DL (ref 13–17.7)
LYMPHOCYTES # BLD MANUAL: 1.46 10*3/MM3 (ref 0.7–3.1)
LYMPHOCYTES NFR BLD MANUAL: 25.3 % (ref 5–12)
MAGNESIUM SERPL-MCNC: 2 MG/DL (ref 1.6–2.4)
MCH RBC QN AUTO: 26.6 PG (ref 26.6–33)
MCHC RBC AUTO-ENTMCNC: 30.7 G/DL (ref 31.5–35.7)
MCV RBC AUTO: 86.7 FL (ref 79–97)
MONOCYTES # BLD: 1.02 10*3/MM3 (ref 0.1–0.9)
NEUTROPHILS # BLD AUTO: 1.5 10*3/MM3 (ref 1.7–7)
NEUTROPHILS NFR BLD MANUAL: 34.3 % (ref 42.7–76)
NEUTS BAND NFR BLD MANUAL: 3 % (ref 0–5)
PLASMA CELL PREC NFR BLD MANUAL: 1 % (ref 0–0)
PLAT MORPH BLD: NORMAL
PLATELET # BLD AUTO: 150 10*3/MM3 (ref 140–450)
PMV BLD AUTO: 10 FL (ref 6–12)
POLYCHROMASIA BLD QL SMEAR: ABNORMAL
POTASSIUM SERPL-SCNC: 4.2 MMOL/L (ref 3.5–5.2)
PROT SERPL-MCNC: 7.1 G/DL (ref 6–8.5)
RBC # BLD AUTO: 4.58 10*6/MM3 (ref 4.14–5.8)
SODIUM SERPL-SCNC: 142 MMOL/L (ref 136–145)
VARIANT LYMPHS NFR BLD MANUAL: 27.3 % (ref 19.6–45.3)
VARIANT LYMPHS NFR BLD MANUAL: 9.1 % (ref 0–5)
WBC MORPH BLD: NORMAL
WBC NRBC COR # BLD AUTO: 4.02 10*3/MM3 (ref 3.4–10.8)

## 2024-09-17 PROCEDURE — 85025 COMPLETE CBC W/AUTO DIFF WBC: CPT

## 2024-09-17 PROCEDURE — 25010000002 PALONOSETRON PER 25 MCG: Performed by: INTERNAL MEDICINE

## 2024-09-17 PROCEDURE — 96375 TX/PRO/DX INJ NEW DRUG ADDON: CPT

## 2024-09-17 PROCEDURE — G0498 CHEMO EXTEND IV INFUS W/PUMP: HCPCS

## 2024-09-17 PROCEDURE — 36415 COLL VENOUS BLD VENIPUNCTURE: CPT

## 2024-09-17 PROCEDURE — 96411 CHEMO IV PUSH ADDL DRUG: CPT

## 2024-09-17 PROCEDURE — 25010000002 FLUOROURACIL PER 500 MG: Performed by: INTERNAL MEDICINE

## 2024-09-17 PROCEDURE — 25010000002 LEUCOVORIN CALCIUM PER 50 MG: Performed by: INTERNAL MEDICINE

## 2024-09-17 PROCEDURE — 25010000002 DEXAMETHASONE SODIUM PHOSPHATE 100 MG/10ML SOLUTION: Performed by: INTERNAL MEDICINE

## 2024-09-17 PROCEDURE — 80053 COMPREHEN METABOLIC PANEL: CPT

## 2024-09-17 PROCEDURE — 0 DEXTROSE 5 % SOLUTION 250 ML FLEX CONT: Performed by: INTERNAL MEDICINE

## 2024-09-17 PROCEDURE — 96367 TX/PROPH/DG ADDL SEQ IV INF: CPT

## 2024-09-17 PROCEDURE — 83735 ASSAY OF MAGNESIUM: CPT

## 2024-09-17 PROCEDURE — 96368 THER/DIAG CONCURRENT INF: CPT

## 2024-09-17 PROCEDURE — 96415 CHEMO IV INFUSION ADDL HR: CPT

## 2024-09-17 PROCEDURE — 0 DEXTROSE 5 % SOLUTION: Performed by: INTERNAL MEDICINE

## 2024-09-17 PROCEDURE — 25010000002 FOSAPREPITANT PER 1 MG: Performed by: INTERNAL MEDICINE

## 2024-09-17 PROCEDURE — 25010000002 OXALIPLATIN PER 0.5 MG: Performed by: INTERNAL MEDICINE

## 2024-09-17 PROCEDURE — 85007 BL SMEAR W/DIFF WBC COUNT: CPT

## 2024-09-17 PROCEDURE — 96413 CHEMO IV INFUSION 1 HR: CPT

## 2024-09-17 PROCEDURE — 25010000002 LEUCOVORIN 200 MG RECONSTITUTED SOLUTION 1 EACH VIAL: Performed by: INTERNAL MEDICINE

## 2024-09-17 RX ORDER — DEXTROSE MONOHYDRATE 50 MG/ML
20 INJECTION, SOLUTION INTRAVENOUS ONCE
Status: COMPLETED | OUTPATIENT
Start: 2024-09-17 | End: 2024-09-17

## 2024-09-17 RX ORDER — PALONOSETRON 0.05 MG/ML
0.25 INJECTION, SOLUTION INTRAVENOUS ONCE
Status: COMPLETED | OUTPATIENT
Start: 2024-09-17 | End: 2024-09-17

## 2024-09-17 RX ORDER — FLUOROURACIL 50 MG/ML
400 INJECTION, SOLUTION INTRAVENOUS ONCE
Status: COMPLETED | OUTPATIENT
Start: 2024-09-17 | End: 2024-09-17

## 2024-09-17 RX ADMIN — LEUCOVORIN CALCIUM 860 MG: 350 INJECTION, POWDER, LYOPHILIZED, FOR SUSPENSION INTRAMUSCULAR; INTRAVENOUS at 10:17

## 2024-09-17 RX ADMIN — FOSAPREPITANT 150 MG: 150 INJECTION, POWDER, LYOPHILIZED, FOR SOLUTION INTRAVENOUS at 09:41

## 2024-09-17 RX ADMIN — DEXTROSE MONOHYDRATE 20 ML/HR: 50 INJECTION, SOLUTION INTRAVENOUS at 09:25

## 2024-09-17 RX ADMIN — PALONOSETRON HYDROCHLORIDE 0.25 MG: 0.25 INJECTION, SOLUTION INTRAVENOUS at 09:25

## 2024-09-17 RX ADMIN — DEXAMETHASONE SODIUM PHOSPHATE 12 MG: 10 INJECTION, SOLUTION INTRAMUSCULAR; INTRAVENOUS at 09:23

## 2024-09-17 RX ADMIN — FLUOROURACIL 5160 MG: 50 INJECTION, SOLUTION INTRAVENOUS at 12:55

## 2024-09-17 RX ADMIN — FLUOROURACIL 860 MG: 50 INJECTION, SOLUTION INTRAVENOUS at 12:48

## 2024-09-17 RX ADMIN — OXALIPLATIN 185 MG: 5 INJECTION, SOLUTION INTRAVENOUS at 10:14

## 2024-09-19 ENCOUNTER — INFUSION (OUTPATIENT)
Dept: ONCOLOGY | Facility: HOSPITAL | Age: 64
End: 2024-09-19
Payer: MEDICARE

## 2024-09-19 VITALS
OXYGEN SATURATION: 99 % | DIASTOLIC BLOOD PRESSURE: 57 MMHG | HEART RATE: 72 BPM | TEMPERATURE: 97.5 F | RESPIRATION RATE: 20 BRPM | SYSTOLIC BLOOD PRESSURE: 125 MMHG

## 2024-09-19 DIAGNOSIS — Z95.828 PORT-A-CATH IN PLACE: ICD-10-CM

## 2024-09-19 DIAGNOSIS — C20 RECTAL CANCER: Primary | ICD-10-CM

## 2024-09-19 PROCEDURE — 25010000002 HEPARIN LOCK FLUSH PER 10 UNITS: Performed by: INTERNAL MEDICINE

## 2024-09-19 RX ORDER — SODIUM CHLORIDE 0.9 % (FLUSH) 0.9 %
10 SYRINGE (ML) INJECTION AS NEEDED
OUTPATIENT
Start: 2024-09-19

## 2024-09-19 RX ORDER — SODIUM CHLORIDE 0.9 % (FLUSH) 0.9 %
10 SYRINGE (ML) INJECTION AS NEEDED
Status: DISCONTINUED | OUTPATIENT
Start: 2024-09-19 | End: 2024-09-19 | Stop reason: HOSPADM

## 2024-09-19 RX ORDER — HEPARIN SODIUM (PORCINE) LOCK FLUSH IV SOLN 100 UNIT/ML 100 UNIT/ML
500 SOLUTION INTRAVENOUS AS NEEDED
OUTPATIENT
Start: 2024-09-19

## 2024-09-19 RX ORDER — HEPARIN SODIUM (PORCINE) LOCK FLUSH IV SOLN 100 UNIT/ML 100 UNIT/ML
500 SOLUTION INTRAVENOUS AS NEEDED
Status: DISCONTINUED | OUTPATIENT
Start: 2024-09-19 | End: 2024-09-19 | Stop reason: HOSPADM

## 2024-09-19 RX ADMIN — HEPARIN 500 UNITS: 100 SYRINGE at 10:48

## 2024-09-19 RX ADMIN — Medication 10 ML: at 10:48

## 2024-09-20 ENCOUNTER — TELEPHONE (OUTPATIENT)
Dept: ONCOLOGY | Facility: CLINIC | Age: 64
End: 2024-09-20

## 2024-09-20 RX ORDER — LIDOCAINE/PRILOCAINE 2.5 %-2.5%
CREAM (GRAM) TOPICAL
Qty: 30 G | Refills: 2 | Status: SHIPPED | OUTPATIENT
Start: 2024-09-20

## 2024-09-20 NOTE — TELEPHONE ENCOUNTER
Caller: Antwan Stevenson    Relationship to patient: Self    Best call back number: [phone]    Chief complaint:     Type of visit: F/U 1     Requested date: 10/1/2024 IS ALREADY IN THE OFFICE FOR AN INFUSION     If rescheduling, when is the original appointment: 9/24/2024    Additional notes: CANCEL 9/24/2024 LAB

## 2024-09-24 NOTE — PROGRESS NOTES
[facility] HEMATOLOGY & ONCOLOGY  [address]  Located within [facility] [address] [phone]    Patient Name: Antwan Stevenson  Encounter Date: 10/01/2024  YOB: 1960  Patient Number: [id number]      REASON FOR FOLLOW-UP: Antwan Stevenson is a pleasant 64 y.o.  male who is seen on follow-up for rectal carcinoma.  He is seen C8D1 of neoadjuvant FOLFOX.    The patient is seen alone. History is obtained from patient.  History is considered reliable.        DIAGNOSTIC ABNORMALITIES:   He presented with hematochezia.  Patient seen by Jose Guadalupe Diez, APRN 3/20/2024.  He reports frequent rectal bleeding.  Patient scheduled for colonoscopy.  CT abdomen pelvis 4/11/2024. Bilateral nephrolithiasis without obstructive uropathy.   Cholelithiasis.   Left pelvic spigelian hernia containing a portion of sigmoid colon.  No bowel obstruction or inflammation. Fatty supraumbilical and bilateral inguinal hernias present as well.  Evidence of prior umbilical hernia repair.  Bibasilar pulmonary micronodules measuring up to 0.4 cm.  Chest CT follow-up in 12 months recommended unless shorter interval follow-up is warranted by history.   Colonoscopy 4/17/2024.  Anal mass. The colon was normal from the rectum to the mid transverse, as I had to use an endoscope due to the severe anal stenosis.  No specimens collected.  The digital rectal exam revealed a firm, hard and obstructing anal mass. The mass was circumferential. Findings: The entire examined colon appeared normal  Pathology report 4/18/2024. 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  Phone note 4/18/2024.  Patient referred to oncology and colorectal surgery at [place].  CEA at 2.1 on 4/24/2024.  Pelvic MRI report on 4/25/2024. 4.5 cm circular solid lower rectum/anal canal neoplasm without evidence of mucinous features. There is more inferior extent involving the  anal canal at the 12:00/12:30 position and 6:00/6:30 position with involvement of the most inferior aspect of the right external anal sphincter. At the level of the anorectal junction there is also posterior extramural extent of tumor into the intersphincteric space abutting and likely involving the external anal sphincter. Two indeterminate right mesorectal lymph nodes.   Note from Dr. Kaylie Stewart 5/1/2024. Plan for total neoadjuvant chemo.  CT chest report 5/3/2024. No definite evidence of metastatic disease in the chest.  Indeterminate 3 mm and 5 mm right lower lobe pulmonary nodules. Recommend a follow-up chest CT in 3 to 6 months to evaluate for stability. Hepatic steatosis. Cholelithiasis. Bilateral nonobstructing renal calculi.           PREVIOUS INTERVENTIONS:   He is undergoing neoadjuvant FOLFOX 5/21/2024 through present.        Oncology/Hematology History   Rectal cancer   4/24/2024 Initial Diagnosis    Rectal cancer     5/21/2024 -  Chemotherapy    OP COLON [id number] OXALIplatin / Leucovorin / Fluorouracil         PAST MEDICAL HISTORY:  ALLERGIES:  No Known Allergies  CURRENT MEDICATIONS:  Outpatient Encounter Medications as of 10/1/2024   Medication Sig Dispense Refill    acetaminophen (Tylenol) 325 MG tablet Take 3 tablets by mouth Every 8 (Eight) Hours. Take every 8 hours for 3 days then take prn as needed.      albuterol sulfate  (90 Base) MCG/ACT inhaler Inhale 2 puffs Every 6 (Six) Hours As Needed.      HYDROcodone-acetaminophen (NORCO) 5-325 MG per tablet Take 1 tablet by mouth Every 6 (Six) Hours As Needed for Mild Pain or Moderate Pain. (Patient taking differently: Take 0.5 tablets by mouth At Night As Needed for Mild Pain or Moderate Pain (rectal pain from cancer).) 100 tablet 0    ibuprofen (ADVIL,MOTRIN) 200 MG tablet Take 2 tablets by mouth Every 6 (Six) Hours As Needed for Mild Pain.      lidocaine-prilocaine (EMLA) 2.5-2.5 % cream 30 minutes prior to treatment apply generous  amount of Emla Cream to your port site and cover with clear plastic wrap until ready for use 30 g 2    loratadine (CLARITIN) 10 MG tablet Take 1 tablet by mouth Daily.      multivitamin (THERAGRAN) tablet tablet Take 1 tablet by mouth Daily.      ondansetron (Zofran) 4 MG tablet Take 1 tablet by mouth Every 8 (Eight) Hours As Needed for Nausea or Vomiting. 15 tablet 0    ondansetron (Zofran) 8 MG tablet Take 1 tablet by mouth Every 8 (Eight) Hours As Needed for Nausea or Vomiting. 60 tablet 2    predniSONE (DELTASONE) 5 MG tablet Take 1 tablet by mouth Daily.      prochlorperazine (COMPAZINE) 10 MG tablet Take 1 tablet by mouth Every 4 (Four) Hours As Needed for Nausea or Vomiting. 60 tablet 2    temazepam (RESTORIL) 7.5 MG capsule Take 1 capsule by mouth At Night As Needed for Sleep. HAS NOT STARTED THIS Rx      traMADol (ULTRAM) 50 MG tablet Take 1 tablet by mouth Every 6 (Six) Hours As Needed for Moderate Pain. 180 tablet 0     No facility-administered encounter medications on file as of 10/1/2024.     ADULT ILLNESSES:  Patient Active Problem List   Diagnosis Code    Rectal bleed K62.5    Rectal cancer C20    Port-A-Cath in place Z95.828     SURGERIES:  Past Surgical History:   Procedure Laterality Date    COLONOSCOPY N/A 4/17/2024    Procedure: COLONOSCOPY WITH ANESTHESIA;  Surgeon: Salty Arias DO;  Location: [facility] ENDOSCOPY;  Service: Gastroenterology;  Laterality: N/A;  preop; rectal bleed   postop questionable anal mass   PCP Duy Lomeli    HEMORROIDECTOMY      HERNIA REPAIR      VENOUS ACCESS DEVICE (PORT) INSERTION N/A 5/16/2024    Procedure: Single Lumen Port-a-cath insertion with flouroscopy;  Surgeon: Rosa Mccall MD;  Location: [facility] OR;  Service: General;  Laterality: N/A;     HEALTH MAINTENANCE ITEMS:  Health Maintenance Due   Topic Date Due    BMI FOLLOWUP  Never done    TDAP/TD VACCINES (1 - Tdap) Never done    ZOSTER VACCINE (1 of 2) Never done    Pneumococcal Vaccine 65+ (1 of 1 -  "PCV) Never done    HEPATITIS C SCREENING  Never done    ANNUAL WELLNESS VISIT  Never done    INFLUENZA VACCINE  08/01/2024    COVID-19 Vaccine (4 - 2023-24 season) 09/01/2024       <no information>  Last Completed Colonoscopy            Discontinued - COLORECTAL CANCER SCREENING  Discontinued        Frequency changed to Never automatically (Topic No Longer Applies)    05/01/2024  Outside Procedure: COLONOSCOPY    04/17/2024  Colonoscopy    04/17/2024  Surgical Procedure: COLONOSCOPY                  Immunization History   Administered Date(s) Administered    COVID-19 (MODERNA) 1st,2nd,3rd Dose Monovalent 04/22/2021, 05/24/2021    COVID-19 (MODERNA) Monovalent Original Booster 01/04/2022     Last Completed Mammogram       This patient has no relevant Health Maintenance data.              FAMILY HISTORY:  Family History   Problem Relation Age of Onset    Colon cancer Neg Hx     Colon polyps Neg Hx     Esophageal cancer Neg Hx      SOCIAL HISTORY:  Social History     Socioeconomic History    Marital status:    Tobacco Use    Smoking status: Never    Smokeless tobacco: Never   Vaping Use    Vaping status: Never Used   Substance and Sexual Activity    Alcohol use: Never    Drug use: Never    Sexual activity: Defer       REVIEW OF SYSTEMS:    Review of Systems   Constitutional:  Positive for fatigue. Negative for fever.        \"Been very tired.\"   HENT:  Negative for congestion and mouth sores.    Eyes:  Negative for discharge and redness.   Respiratory:  Negative for shortness of breath and wheezing.    Cardiovascular:  Negative for chest pain and leg swelling.   Gastrointestinal:  Negative for constipation and vomiting.        \"Little nausea and little diarrhea.\"   Endocrine: Negative for cold intolerance and heat intolerance.   Genitourinary:  Negative for difficulty urinating and dysuria.   Musculoskeletal:  Negative for gait problem and myalgias.   Skin:  Positive for pallor.   Allergic/Immunologic: Negative " "for food allergies.   Neurological:  Negative for dizziness, speech difficulty and weakness.   Hematological:  Negative for adenopathy. Does not bruise/bleed easily.   Psychiatric/Behavioral:  Negative for agitation, confusion and hallucinations.        VITAL SIGNS: /68   Pulse 64   Temp 97.7 °F (36.5 °C)   Resp 18   Ht 167.6 cm (65.98\")   Wt 105 kg (232 lb)   SpO2 96%   BMI 37.47 kg/m²   Pain Score    10/01/24 0832   PainSc: 0-No pain  Comment: hip pain and pelvic region       PHYSICAL EXAMINATION:     Physical Exam  Vitals reviewed.   Constitutional:       General: He is not in acute distress.  HENT:      Head: Normocephalic and atraumatic.   Eyes:      General: No scleral icterus.  Cardiovascular:      Rate and Rhythm: Normal rate.   Pulmonary:      Breath sounds: No wheezing or rales.      Comments: Port, right. No,erythema.  Abdominal:      General: Bowel sounds are normal.      Palpations: Abdomen is soft.      Tenderness: There is no abdominal tenderness.   Musculoskeletal:         General: No swelling.      Cervical back: Neck supple.   Skin:     Coloration: Skin is pale.   Neurological:      Mental Status: He is alert and oriented to person, place, and time.   Psychiatric:         Mood and Affect: Mood normal.         Behavior: Behavior normal.         Thought Content: Thought content normal.         Judgment: Judgment normal.         LABS    Lab Results - Last 18 Months   Lab Units 10/01/24  0817 09/17/24  0826 09/10/24  0832 08/27/24  0812 08/13/24  0818 08/06/24  0924 07/30/24  0810 07/22/24  0740 04/24/24  1406 02/29/24  0859   HEMOGLOBIN g/dL 12.8* 12.2* 12.9* 12.2* 12.5* 12.7* 12.0* 13.6   < > 14.2   HEMATOCRIT % 40.8 39.7 41.4 39.6 40.0 40.9 37.6 44.1   < > 46.0   MCV fL 82.8 86.7 82.8 84.4 84.7 83.5 82.3 84.2   < > 84.4   WBC 10*3/mm3 4.71 4.02 5.77 3.86 5.64 5.47 4.92 9.70   < > 6.2   RDW % 15.8* 15.9* 16.0* 15.4 16.2* 16.2* 16.8* 17.2*   < > 13.8   MPV fL 10.5 10.0 9.4 9.6 9.6 9.3 9.4 " 10.6   < > 10.1   PLATELETS 10*3/mm3 87* 150 93* 105* 190 144 176 60*   < > 298   NEUTROS ABS 10*3/mm3  --  1.50* 1.34* 1.62* 1.88 1.26* 1.14* 7.15*   < > 3.5   LYMPHS ABS K/uL  --   --   --   --   --   --   --   --   --  1.6   MONOS ABS K/uL  --   --   --   --   --   --   --   --   --  1.00*   EOS ABS 10*3/mm3  --  0.00 0.00 0.00 0.23 0.05 0.00 0.10   < > 0.06   BASOS ABS 10*3/mm3  --  0.00 0.00 0.00 0.00 0.00 0.00 0.10   < > 0.10   IMMATURE GRANS (ABS) K/uL  --   --   --   --   --   --   --   --   --  0.0   NRBC /100 WBC  --   --   --   --   --   --   --  1.0*  --   --    NEUTROPHIL % %  --  34.3* 23.2* 41.9* 32.3* 23.0* 22.2* 70.7   < >  --    MONOCYTES % %  --  25.3* 21.2* 10.5 17.2* 20.0* 13.1* 7.1   < >  --    BASOPHIL % %  --  0.0 0.0 0.0 0.0 0.0 0.0 1.0   < >  --    ATYP LYMPH % %  --  9.1* 10.1* 3.5 1.0 7.0* 11.1* 3.0   < >  --    ANISOCYTOSIS   --  Slight/1+ Slight/1+ Slight/1+  --  Slight/1+ Mod/2+ Slight/1+  --   --     < > = values in this interval not displayed.       Lab Results - Last 18 Months   Lab Units 09/17/24  0826 09/10/24  0832 08/27/24  0812 08/13/24  0818 08/06/24  0924 07/30/24  0810   GLUCOSE mg/dL 97 98 136* 95 130* 86   SODIUM mmol/L 142 139 139 141 138 140   POTASSIUM mmol/L 4.2 4.6 3.9 4.6 3.8 4.2   CO2 mmol/L 27.0 28.0 25.0 27.0 24.0 23.0   CHLORIDE mmol/L 107 104 105 104 104 106   ANION GAP mmol/L 8.0 7.0 9.0 10.0 10.0 11.0   CREATININE mg/dL 0.72* 1.01 0.92 0.92 0.81 0.83   BUN mg/dL 8 12 7* 9 8 7*   BUN / CREAT RATIO  11.1 11.9 7.6 9.8 9.9 8.4   CALCIUM mg/dL 9.4 9.2 8.8 9.4 9.2 9.0   ALK PHOS U/L 90 101 98 106 107 117   TOTAL PROTEIN g/dL 7.1 7.8 7.2 7.7 7.9 7.1   ALT (SGPT) U/L 14 19 14 17 17 23   AST (SGOT) U/L 27 30 26 24 26 32   BILIRUBIN mg/dL 0.3 0.7 0.4 0.6 0.4 0.4   ALBUMIN g/dL 3.8 4.0 3.8 4.1 3.9 3.8   GLOBULIN gm/dL 3.3 3.8 3.4 3.6 4.0 3.3       Lab Results - Last 18 Months   Lab Units 04/24/24  1406   CEA ng/mL 2.10       Lab Results - Last 18 Months   Lab Units  "07/16/24  1459 06/18/24  0744 06/04/24  0722 02/29/24  0859   IRON mcg/dL 29*  --  97  --    TIBC mcg/dL 335  --  328  --    IRON SATURATION (TSAT) % 9*  --  30  --    FERRITIN ng/mL 1,735.00*  --  263.00  --    TSH uIU/mL  --   --   --  2.460   FOLATE ng/mL  --  >20.00  --   --        Antwan Stevenson reports a pain score of 0.         ASSESSMENT:  Adenocarcinoma the rectum. KIKE on 4/28/2024.  AJCC stage:IIIb (cT3, cN1b, cM0)  Treatment status: Patient is undergoing neoadjuvant FOLFOX from 5/21/2024 through present.  To be followed by CIV 5FU with radiation.   2.  Performance status of 1.  3.  Grade 2 neutropenia without fever from chemo.  He is on pegfilgrastim as prophylaxis.  4.  Normocytic anemia secondary to chemo.  5.  ?Interstitial granulomatous dermatitis with arthritis. On prednisone 5 mg daily.    6.  Elevated liver function test.  CT abdomen 8/2/2024 showed no liver metastasis.  7.  Neutrophilia from pegfilgrastim.  8.  Bibasilar pulmonary micronodules measuring up to 0.4 cm on 4/11/2024.  On observation.  9.  Thrombocytopenia from chemo. Observe.           PLAN:   Re: Tolerance to neoadjuvant FOLFOX.  \"Very tired, little nausea and diarrhea.\"  2.   Re:  Heme status.  WBC 4.7, ANC pending, hemoglobin 12.8, hematocrit 40.8, MCV 82.8 and platelet 87. No dose reduction of oxaliplatin at this point.   3.   Re:  CMP. GFR AST pending and alkaline phosphatase pending.  4.   Re:  Magnesium 2.1.  5.   Re: Treatment delay secondary to grade 3 neutropenia without fever.  6.   Re:  IVF support on D3 due to nausea and worsening fatigue.  7.  CV total neoadjuvant FOLFOX if ANC at least 1.5.  Then he will undergo neoadjuvant CIV 5-FU with radiation.   8.  Plan to complete 12 cycles of neoadjuvant FOLFOX:  9.  Sore for potential anaphylaxis, infusion reactions, nausea, vomiting, diarrhea, cold intolerance, myelosuppression, neuropathy or alopecia.  10.  Schedule treatment C8 D1 to 3 " on 10/1/2024 and C9 D1 to D3 on 10/15/2024.  To be administered every 2 weeks: Oxaliplatin 85 mg/m2 IVPB.  Leucovorin 400 mg/m2.  5- mg/ m2  IVP.  Begin 5-FU 2,400 mg/m2 IVPB over 46 hours.    11.    Premedicate with:  Aloxi 0.25 mg IVP.  Emend 150 mg IV.  Decadron 12 mg IVPB over 20-30 min.  12.  PEG filgrastim D3 as primary prophylaxis.  Cycle 3 on 6/18/2024 delayed due to ANC of 1.2.  Monitor for arthralgias. C7 delayed 9/10/2024 due to ANC of 1.3.  13.   Weekly CBC with differential, magnesium, and CMP with FOLFOX.  14.  eRx ondansetron 8 mg p.o. every 8 hours as needed for nausea and vomiting #60, 2 refills if needed.  15.  eRx prochlorperazine 10 mg p.o. every 4 hours as needed for nausea and vomiting #60, 2 refills if needed.  16.  eRx tramadol 50 mg p.o. every 6 hours as needed for pain, #180, 0 refill if needed.  17.  Continue care per primary care physician and the other specialists.  18.  Plan of care discussed with patient.  Understanding expressed.  He is agreeable to proceed.  19.  Advance Care Planning  ACP discussion was declined by the patient. Patient does not have an advance directive, information provided.    20. Order 1 liter NS over 2 hours with Zofran 8 mg IV and Decadron 12 mg IV 10/3/2024 and as needed.   21. Return to office in 4 weeks. Plan to repeat MRI pelvis and CT chest after neoadjuvant chemo.        I have reviewed the assessment and plan and verified the accuracy of it. No changes to assessment and plan since the information was documented. Raul Mayberry MD 10/01/24         I spent 40 total minutes, face-to-face, caring for Antwan today. Greater than 50% of this time involved counseling and/or coordination of care as documented within this note.                (Salty Arias DO)  (Kaylie Stewart MD)   (Marty Benitez MD radiation oncology [place] [facility].  (Bruno Sanford MD)  (Duy Lomeli MD)

## 2024-09-25 DIAGNOSIS — C20 RECTAL CANCER: Primary | ICD-10-CM

## 2024-09-25 DIAGNOSIS — Z95.828 PORT-A-CATH IN PLACE: ICD-10-CM

## 2024-09-25 RX ORDER — FAMOTIDINE 10 MG/ML
20 INJECTION, SOLUTION INTRAVENOUS AS NEEDED
OUTPATIENT
Start: 2024-10-01

## 2024-09-25 RX ORDER — DIPHENHYDRAMINE HYDROCHLORIDE 50 MG/ML
50 INJECTION INTRAMUSCULAR; INTRAVENOUS AS NEEDED
OUTPATIENT
Start: 2024-10-01

## 2024-09-25 RX ORDER — DEXTROSE MONOHYDRATE 50 MG/ML
20 INJECTION, SOLUTION INTRAVENOUS ONCE
OUTPATIENT
Start: 2024-10-01

## 2024-09-25 RX ORDER — FLUOROURACIL 50 MG/ML
400 INJECTION, SOLUTION INTRAVENOUS ONCE
OUTPATIENT
Start: 2024-10-01

## 2024-09-25 RX ORDER — PALONOSETRON 0.05 MG/ML
0.25 INJECTION, SOLUTION INTRAVENOUS ONCE
OUTPATIENT
Start: 2024-10-01

## 2024-10-01 ENCOUNTER — LAB (OUTPATIENT)
Dept: LAB | Facility: HOSPITAL | Age: 64
End: 2024-10-01
Payer: MEDICARE

## 2024-10-01 ENCOUNTER — TELEPHONE (OUTPATIENT)
Dept: ONCOLOGY | Facility: CLINIC | Age: 64
End: 2024-10-01

## 2024-10-01 ENCOUNTER — INFUSION (OUTPATIENT)
Dept: ONCOLOGY | Facility: HOSPITAL | Age: 64
End: 2024-10-01
Payer: MEDICARE

## 2024-10-01 ENCOUNTER — OFFICE VISIT (OUTPATIENT)
Dept: ONCOLOGY | Facility: CLINIC | Age: 64
End: 2024-10-01
Payer: MEDICARE

## 2024-10-01 VITALS
DIASTOLIC BLOOD PRESSURE: 68 MMHG | HEART RATE: 87 BPM | SYSTOLIC BLOOD PRESSURE: 146 MMHG | OXYGEN SATURATION: 96 % | TEMPERATURE: 97.6 F | WEIGHT: 232 LBS | BODY MASS INDEX: 37.28 KG/M2 | HEIGHT: 66 IN | RESPIRATION RATE: 20 BRPM

## 2024-10-01 VITALS
SYSTOLIC BLOOD PRESSURE: 124 MMHG | BODY MASS INDEX: 37.28 KG/M2 | HEART RATE: 64 BPM | DIASTOLIC BLOOD PRESSURE: 68 MMHG | WEIGHT: 232 LBS | TEMPERATURE: 97.7 F | HEIGHT: 66 IN | OXYGEN SATURATION: 96 % | RESPIRATION RATE: 18 BRPM

## 2024-10-01 DIAGNOSIS — C20 RECTAL CANCER: Primary | ICD-10-CM

## 2024-10-01 DIAGNOSIS — Z95.828 PORT-A-CATH IN PLACE: ICD-10-CM

## 2024-10-01 LAB
ALBUMIN SERPL-MCNC: 3.9 G/DL (ref 3.5–5.2)
ALBUMIN/GLOB SERPL: 1.1 G/DL
ALP SERPL-CCNC: 105 U/L (ref 39–117)
ALT SERPL W P-5'-P-CCNC: 16 U/L (ref 1–41)
ANION GAP SERPL CALCULATED.3IONS-SCNC: 11 MMOL/L (ref 5–15)
ANISOCYTOSIS BLD QL: ABNORMAL
AST SERPL-CCNC: 29 U/L (ref 1–40)
BASOPHILS # BLD MANUAL: 0 10*3/MM3 (ref 0–0.2)
BASOPHILS NFR BLD MANUAL: 0 % (ref 0–1.5)
BILIRUB SERPL-MCNC: 0.5 MG/DL (ref 0–1.2)
BUN SERPL-MCNC: 10 MG/DL (ref 8–23)
BUN/CREAT SERPL: 10.8 (ref 7–25)
CALCIUM SPEC-SCNC: 9.4 MG/DL (ref 8.6–10.5)
CHLORIDE SERPL-SCNC: 103 MMOL/L (ref 98–107)
CO2 SERPL-SCNC: 24 MMOL/L (ref 22–29)
CREAT SERPL-MCNC: 0.93 MG/DL (ref 0.76–1.27)
DEPRECATED RDW RBC AUTO: 47.4 FL (ref 37–54)
EGFRCR SERPLBLD CKD-EPI 2021: 90.6 ML/MIN/1.73
EOSINOPHIL # BLD MANUAL: 0 10*3/MM3 (ref 0–0.4)
EOSINOPHIL NFR BLD MANUAL: 0 % (ref 0.3–6.2)
ERYTHROCYTE [DISTWIDTH] IN BLOOD BY AUTOMATED COUNT: 15.8 % (ref 12.3–15.4)
GLOBULIN UR ELPH-MCNC: 3.5 GM/DL
GLUCOSE SERPL-MCNC: 120 MG/DL (ref 65–99)
HCT VFR BLD AUTO: 40.8 % (ref 37.5–51)
HGB BLD-MCNC: 12.8 G/DL (ref 13–17.7)
HOLD SPECIMEN: NORMAL
LYMPHOCYTES # BLD MANUAL: 2.67 10*3/MM3 (ref 0.7–3.1)
LYMPHOCYTES NFR BLD MANUAL: 15.2 % (ref 5–12)
MAGNESIUM SERPL-MCNC: 2.1 MG/DL (ref 1.6–2.4)
MCH RBC QN AUTO: 26 PG (ref 26.6–33)
MCHC RBC AUTO-ENTMCNC: 31.4 G/DL (ref 31.5–35.7)
MCV RBC AUTO: 82.8 FL (ref 79–97)
MICROCYTES BLD QL: ABNORMAL
MONOCYTES # BLD: 0.72 10*3/MM3 (ref 0.1–0.9)
NEUTROPHILS # BLD AUTO: 1.33 10*3/MM3 (ref 1.7–7)
NEUTROPHILS NFR BLD MANUAL: 28.3 % (ref 42.7–76)
PLATELET # BLD AUTO: 87 10*3/MM3 (ref 140–450)
PMV BLD AUTO: 10.5 FL (ref 6–12)
POTASSIUM SERPL-SCNC: 4 MMOL/L (ref 3.5–5.2)
PROT SERPL-MCNC: 7.4 G/DL (ref 6–8.5)
RBC # BLD AUTO: 4.93 10*6/MM3 (ref 4.14–5.8)
SMALL PLATELETS BLD QL SMEAR: ABNORMAL
SODIUM SERPL-SCNC: 138 MMOL/L (ref 136–145)
VARIANT LYMPHS NFR BLD MANUAL: 19.2 % (ref 0–5)
VARIANT LYMPHS NFR BLD MANUAL: 37.4 % (ref 19.6–45.3)
WBC MORPH BLD: NORMAL
WBC NRBC COR # BLD AUTO: 4.71 10*3/MM3 (ref 3.4–10.8)

## 2024-10-01 PROCEDURE — 85007 BL SMEAR W/DIFF WBC COUNT: CPT

## 2024-10-01 PROCEDURE — G0463 HOSPITAL OUTPT CLINIC VISIT: HCPCS

## 2024-10-01 PROCEDURE — 83735 ASSAY OF MAGNESIUM: CPT

## 2024-10-01 PROCEDURE — 36415 COLL VENOUS BLD VENIPUNCTURE: CPT

## 2024-10-01 PROCEDURE — 85025 COMPLETE CBC W/AUTO DIFF WBC: CPT

## 2024-10-01 PROCEDURE — 80053 COMPREHEN METABOLIC PANEL: CPT

## 2024-10-01 NOTE — TELEPHONE ENCOUNTER
----- Message from Raul Mayberry sent at 10/1/2024  9:04 AM CDT -----  Hold chemo this week. ANC 1.3.

## 2024-10-01 NOTE — PROGRESS NOTES
Patient presents to infusion center for chemotherapy.  Labs reviewed and do not meet criteria.  Patient will be rescheduled to next week per provider.  Patient v/u. No charges indicated.   YOCASTA Peña RN

## 2024-10-08 ENCOUNTER — LAB (OUTPATIENT)
Dept: LAB | Facility: HOSPITAL | Age: 64
End: 2024-10-08
Payer: MEDICARE

## 2024-10-08 ENCOUNTER — INFUSION (OUTPATIENT)
Dept: ONCOLOGY | Facility: HOSPITAL | Age: 64
End: 2024-10-08
Payer: MEDICARE

## 2024-10-08 VITALS
OXYGEN SATURATION: 95 % | WEIGHT: 235.2 LBS | RESPIRATION RATE: 18 BRPM | HEIGHT: 66 IN | SYSTOLIC BLOOD PRESSURE: 151 MMHG | DIASTOLIC BLOOD PRESSURE: 82 MMHG | TEMPERATURE: 96.8 F | HEART RATE: 85 BPM | BODY MASS INDEX: 37.8 KG/M2

## 2024-10-08 DIAGNOSIS — C20 RECTAL CANCER: ICD-10-CM

## 2024-10-08 DIAGNOSIS — Z95.828 PORT-A-CATH IN PLACE: ICD-10-CM

## 2024-10-08 DIAGNOSIS — C20 RECTAL CANCER: Primary | ICD-10-CM

## 2024-10-08 LAB
ALBUMIN SERPL-MCNC: 3.8 G/DL (ref 3.5–5.2)
ALBUMIN/GLOB SERPL: 1.1 G/DL
ALP SERPL-CCNC: 101 U/L (ref 39–117)
ALT SERPL W P-5'-P-CCNC: 15 U/L (ref 1–41)
ANION GAP SERPL CALCULATED.3IONS-SCNC: 10 MMOL/L (ref 5–15)
ANISOCYTOSIS BLD QL: ABNORMAL
AST SERPL-CCNC: 26 U/L (ref 1–40)
BASOPHILS # BLD MANUAL: 0.04 10*3/MM3 (ref 0–0.2)
BASOPHILS NFR BLD MANUAL: 1 % (ref 0–1.5)
BILIRUB SERPL-MCNC: 0.3 MG/DL (ref 0–1.2)
BUN SERPL-MCNC: 6 MG/DL (ref 8–23)
BUN/CREAT SERPL: 7 (ref 7–25)
CALCIUM SPEC-SCNC: 9.2 MG/DL (ref 8.6–10.5)
CHLORIDE SERPL-SCNC: 103 MMOL/L (ref 98–107)
CO2 SERPL-SCNC: 25 MMOL/L (ref 22–29)
CREAT SERPL-MCNC: 0.86 MG/DL (ref 0.76–1.27)
DEPRECATED RDW RBC AUTO: 46.7 FL (ref 37–54)
EGFRCR SERPLBLD CKD-EPI 2021: 95.5 ML/MIN/1.73
EOSINOPHIL # BLD MANUAL: 0 10*3/MM3 (ref 0–0.4)
EOSINOPHIL NFR BLD MANUAL: 0 % (ref 0.3–6.2)
ERYTHROCYTE [DISTWIDTH] IN BLOOD BY AUTOMATED COUNT: 15.5 % (ref 12.3–15.4)
GLOBULIN UR ELPH-MCNC: 3.6 GM/DL
GLUCOSE SERPL-MCNC: 129 MG/DL (ref 65–99)
HCT VFR BLD AUTO: 40.1 % (ref 37.5–51)
HGB BLD-MCNC: 12.4 G/DL (ref 13–17.7)
LYMPHOCYTES # BLD MANUAL: 2.59 10*3/MM3 (ref 0.7–3.1)
LYMPHOCYTES NFR BLD MANUAL: 19.2 % (ref 5–12)
MAGNESIUM SERPL-MCNC: 2.1 MG/DL (ref 1.6–2.4)
MCH RBC QN AUTO: 26.1 PG (ref 26.6–33)
MCHC RBC AUTO-ENTMCNC: 30.9 G/DL (ref 31.5–35.7)
MCV RBC AUTO: 84.4 FL (ref 79–97)
MONOCYTES # BLD: 0.81 10*3/MM3 (ref 0.1–0.9)
MYELOCYTES NFR BLD MANUAL: 1 % (ref 0–0)
NEUTROPHILS # BLD AUTO: 0.72 10*3/MM3 (ref 1.7–7)
NEUTROPHILS NFR BLD MANUAL: 16.2 % (ref 42.7–76)
NEUTS BAND NFR BLD MANUAL: 1 % (ref 0–5)
PLAT MORPH BLD: NORMAL
PLATELET # BLD AUTO: 144 10*3/MM3 (ref 140–450)
PMV BLD AUTO: 9.4 FL (ref 6–12)
POTASSIUM SERPL-SCNC: 3.9 MMOL/L (ref 3.5–5.2)
PROT SERPL-MCNC: 7.4 G/DL (ref 6–8.5)
RBC # BLD AUTO: 4.75 10*6/MM3 (ref 4.14–5.8)
SODIUM SERPL-SCNC: 138 MMOL/L (ref 136–145)
VARIANT LYMPHS NFR BLD MANUAL: 24.2 % (ref 0–5)
VARIANT LYMPHS NFR BLD MANUAL: 37.4 % (ref 19.6–45.3)
WBC MORPH BLD: NORMAL
WBC NRBC COR # BLD AUTO: 4.2 10*3/MM3 (ref 3.4–10.8)

## 2024-10-08 PROCEDURE — 36415 COLL VENOUS BLD VENIPUNCTURE: CPT

## 2024-10-08 PROCEDURE — G0463 HOSPITAL OUTPT CLINIC VISIT: HCPCS

## 2024-10-08 PROCEDURE — 85025 COMPLETE CBC W/AUTO DIFF WBC: CPT

## 2024-10-08 PROCEDURE — 85007 BL SMEAR W/DIFF WBC COUNT: CPT

## 2024-10-08 PROCEDURE — 80053 COMPREHEN METABOLIC PANEL: CPT

## 2024-10-08 PROCEDURE — 83735 ASSAY OF MAGNESIUM: CPT

## 2024-10-08 NOTE — PROGRESS NOTES
Treatment held due to ANC of 0.72. It was explained to the patient that treatment would be held until his counts came back up. Treatment was moved out a week and will re-check labs then. Andrew Munoz RN

## 2024-10-09 DIAGNOSIS — C20 RECTAL CANCER: Primary | ICD-10-CM

## 2024-10-09 DIAGNOSIS — Z95.828 PORT-A-CATH IN PLACE: ICD-10-CM

## 2024-10-11 RX ORDER — APREPITANT 80 MG/1
80 CAPSULE ORAL ONCE
Start: 2024-10-15 | End: 2024-10-15

## 2024-10-11 RX ORDER — DEXAMETHASONE SODIUM PHOSPHATE 10 MG/ML
10 INJECTION INTRAMUSCULAR; INTRAVENOUS ONCE
Start: 2024-10-15

## 2024-10-15 ENCOUNTER — INFUSION (OUTPATIENT)
Dept: ONCOLOGY | Facility: HOSPITAL | Age: 64
End: 2024-10-15
Payer: MEDICARE

## 2024-10-15 ENCOUNTER — LAB (OUTPATIENT)
Dept: LAB | Facility: HOSPITAL | Age: 64
End: 2024-10-15
Payer: MEDICARE

## 2024-10-15 VITALS
WEIGHT: 235 LBS | BODY MASS INDEX: 37.77 KG/M2 | HEART RATE: 74 BPM | TEMPERATURE: 97.2 F | SYSTOLIC BLOOD PRESSURE: 122 MMHG | HEIGHT: 66 IN | RESPIRATION RATE: 17 BRPM | OXYGEN SATURATION: 96 % | DIASTOLIC BLOOD PRESSURE: 70 MMHG

## 2024-10-15 DIAGNOSIS — C20 RECTAL CANCER: ICD-10-CM

## 2024-10-15 DIAGNOSIS — Z95.828 PORT-A-CATH IN PLACE: ICD-10-CM

## 2024-10-15 DIAGNOSIS — C20 RECTAL CANCER: Primary | ICD-10-CM

## 2024-10-15 LAB
ALBUMIN SERPL-MCNC: 3.9 G/DL (ref 3.5–5.2)
ALBUMIN/GLOB SERPL: 1 G/DL
ALP SERPL-CCNC: 102 U/L (ref 39–117)
ALT SERPL W P-5'-P-CCNC: 16 U/L (ref 1–41)
ANION GAP SERPL CALCULATED.3IONS-SCNC: 8 MMOL/L (ref 5–15)
ANISOCYTOSIS BLD QL: ABNORMAL
AST SERPL-CCNC: 25 U/L (ref 1–40)
BASOPHILS # BLD MANUAL: 0 10*3/MM3 (ref 0–0.2)
BASOPHILS NFR BLD MANUAL: 0 % (ref 0–1.5)
BILIRUB SERPL-MCNC: 0.4 MG/DL (ref 0–1.2)
BUN SERPL-MCNC: 10 MG/DL (ref 8–23)
BUN/CREAT SERPL: 11.5 (ref 7–25)
CALCIUM SPEC-SCNC: 9.5 MG/DL (ref 8.6–10.5)
CHLORIDE SERPL-SCNC: 102 MMOL/L (ref 98–107)
CO2 SERPL-SCNC: 28 MMOL/L (ref 22–29)
CREAT SERPL-MCNC: 0.87 MG/DL (ref 0.76–1.27)
DEPRECATED RDW RBC AUTO: 47.9 FL (ref 37–54)
EGFRCR SERPLBLD CKD-EPI 2021: 95.2 ML/MIN/1.73
EOSINOPHIL # BLD MANUAL: 0 10*3/MM3 (ref 0–0.4)
EOSINOPHIL NFR BLD MANUAL: 0 % (ref 0.3–6.2)
ERYTHROCYTE [DISTWIDTH] IN BLOOD BY AUTOMATED COUNT: 15.6 % (ref 12.3–15.4)
GLOBULIN UR ELPH-MCNC: 3.9 GM/DL
GLUCOSE SERPL-MCNC: 84 MG/DL (ref 65–99)
HCT VFR BLD AUTO: 42.6 % (ref 37.5–51)
HGB BLD-MCNC: 13.2 G/DL (ref 13–17.7)
LYMPHOCYTES # BLD MANUAL: 2.95 10*3/MM3 (ref 0.7–3.1)
LYMPHOCYTES NFR BLD MANUAL: 20.6 % (ref 5–12)
MAGNESIUM SERPL-MCNC: 2.2 MG/DL (ref 1.6–2.4)
MCH RBC QN AUTO: 26.2 PG (ref 26.6–33)
MCHC RBC AUTO-ENTMCNC: 31 G/DL (ref 31.5–35.7)
MCV RBC AUTO: 84.7 FL (ref 79–97)
MONOCYTES # BLD: 1.25 10*3/MM3 (ref 0.1–0.9)
NEUTROPHILS # BLD AUTO: 1.88 10*3/MM3 (ref 1.7–7)
NEUTROPHILS NFR BLD MANUAL: 29.9 % (ref 42.7–76)
NEUTS BAND NFR BLD MANUAL: 1 % (ref 0–5)
OVALOCYTES BLD QL SMEAR: ABNORMAL
PLAT MORPH BLD: NORMAL
PLATELET # BLD AUTO: 140 10*3/MM3 (ref 140–450)
PMV BLD AUTO: 9.4 FL (ref 6–12)
POIKILOCYTOSIS BLD QL SMEAR: ABNORMAL
POTASSIUM SERPL-SCNC: 4 MMOL/L (ref 3.5–5.2)
PROT SERPL-MCNC: 7.8 G/DL (ref 6–8.5)
RBC # BLD AUTO: 5.03 10*6/MM3 (ref 4.14–5.8)
SCHISTOCYTES BLD QL SMEAR: ABNORMAL
SODIUM SERPL-SCNC: 138 MMOL/L (ref 136–145)
STOMATOCYTES BLD QL SMEAR: ABNORMAL
VARIANT LYMPHS NFR BLD MANUAL: 21.6 % (ref 0–5)
VARIANT LYMPHS NFR BLD MANUAL: 26.8 % (ref 19.6–45.3)
WBC MORPH BLD: NORMAL
WBC NRBC COR # BLD AUTO: 6.09 10*3/MM3 (ref 3.4–10.8)

## 2024-10-15 PROCEDURE — 25010000002 LEUCOVORIN 200 MG RECONSTITUTED SOLUTION 200 MG VIAL: Performed by: INTERNAL MEDICINE

## 2024-10-15 PROCEDURE — 25010000002 FOSAPREPITANT PER 1 MG: Performed by: INTERNAL MEDICINE

## 2024-10-15 PROCEDURE — 25010000002 LEUCOVORIN 500 MG RECONSTITUTED SOLUTION 1 EACH VIAL: Performed by: INTERNAL MEDICINE

## 2024-10-15 PROCEDURE — 85025 COMPLETE CBC W/AUTO DIFF WBC: CPT

## 2024-10-15 PROCEDURE — 96375 TX/PRO/DX INJ NEW DRUG ADDON: CPT

## 2024-10-15 PROCEDURE — 83735 ASSAY OF MAGNESIUM: CPT

## 2024-10-15 PROCEDURE — 25010000002 FLUOROURACIL PER 500 MG: Performed by: INTERNAL MEDICINE

## 2024-10-15 PROCEDURE — 36415 COLL VENOUS BLD VENIPUNCTURE: CPT

## 2024-10-15 PROCEDURE — 85007 BL SMEAR W/DIFF WBC COUNT: CPT

## 2024-10-15 PROCEDURE — 25010000002 DEXAMETHASONE PER 1 MG: Performed by: INTERNAL MEDICINE

## 2024-10-15 PROCEDURE — 0 DEXTROSE 5 % SOLUTION 250 ML FLEX CONT: Performed by: INTERNAL MEDICINE

## 2024-10-15 PROCEDURE — 96368 THER/DIAG CONCURRENT INF: CPT

## 2024-10-15 PROCEDURE — 96367 TX/PROPH/DG ADDL SEQ IV INF: CPT

## 2024-10-15 PROCEDURE — 96413 CHEMO IV INFUSION 1 HR: CPT

## 2024-10-15 PROCEDURE — 0 DEXTROSE 5 % SOLUTION: Performed by: INTERNAL MEDICINE

## 2024-10-15 PROCEDURE — 25010000002 OXALIPLATIN PER 0.5 MG: Performed by: INTERNAL MEDICINE

## 2024-10-15 PROCEDURE — 96411 CHEMO IV PUSH ADDL DRUG: CPT

## 2024-10-15 PROCEDURE — 25010000002 PALONOSETRON PER 25 MCG: Performed by: INTERNAL MEDICINE

## 2024-10-15 PROCEDURE — G0498 CHEMO EXTEND IV INFUS W/PUMP: HCPCS

## 2024-10-15 PROCEDURE — 96415 CHEMO IV INFUSION ADDL HR: CPT

## 2024-10-15 PROCEDURE — 80053 COMPREHEN METABOLIC PANEL: CPT

## 2024-10-15 RX ORDER — APREPITANT 80 MG/1
80 CAPSULE ORAL ONCE
Status: DISCONTINUED | OUTPATIENT
Start: 2024-10-15 | End: 2024-10-15 | Stop reason: ALTCHOICE

## 2024-10-15 RX ORDER — PALONOSETRON 0.05 MG/ML
0.25 INJECTION, SOLUTION INTRAVENOUS ONCE
Status: COMPLETED | OUTPATIENT
Start: 2024-10-15 | End: 2024-10-15

## 2024-10-15 RX ORDER — FAMOTIDINE 10 MG/ML
20 INJECTION, SOLUTION INTRAVENOUS AS NEEDED
Status: DISCONTINUED | OUTPATIENT
Start: 2024-10-15 | End: 2024-10-15 | Stop reason: HOSPADM

## 2024-10-15 RX ORDER — HEPARIN SODIUM (PORCINE) LOCK FLUSH IV SOLN 100 UNIT/ML 100 UNIT/ML
500 SOLUTION INTRAVENOUS AS NEEDED
Status: CANCELLED | OUTPATIENT
Start: 2024-10-15

## 2024-10-15 RX ORDER — DEXAMETHASONE SODIUM PHOSPHATE 10 MG/ML
10 INJECTION INTRAMUSCULAR; INTRAVENOUS ONCE
Status: COMPLETED | OUTPATIENT
Start: 2024-10-15 | End: 2024-10-15

## 2024-10-15 RX ORDER — DIPHENHYDRAMINE HYDROCHLORIDE 50 MG/ML
50 INJECTION INTRAMUSCULAR; INTRAVENOUS AS NEEDED
Status: DISCONTINUED | OUTPATIENT
Start: 2024-10-15 | End: 2024-10-15 | Stop reason: HOSPADM

## 2024-10-15 RX ORDER — SODIUM CHLORIDE 0.9 % (FLUSH) 0.9 %
10 SYRINGE (ML) INJECTION AS NEEDED
Status: DISCONTINUED | OUTPATIENT
Start: 2024-10-15 | End: 2024-10-15 | Stop reason: HOSPADM

## 2024-10-15 RX ORDER — HEPARIN SODIUM (PORCINE) LOCK FLUSH IV SOLN 100 UNIT/ML 100 UNIT/ML
500 SOLUTION INTRAVENOUS AS NEEDED
Status: DISCONTINUED | OUTPATIENT
Start: 2024-10-15 | End: 2024-10-15 | Stop reason: HOSPADM

## 2024-10-15 RX ORDER — SODIUM CHLORIDE 0.9 % (FLUSH) 0.9 %
10 SYRINGE (ML) INJECTION AS NEEDED
Status: CANCELLED | OUTPATIENT
Start: 2024-10-15

## 2024-10-15 RX ORDER — FLUOROURACIL 50 MG/ML
400 INJECTION, SOLUTION INTRAVENOUS ONCE
Status: COMPLETED | OUTPATIENT
Start: 2024-10-15 | End: 2024-10-15

## 2024-10-15 RX ORDER — DEXTROSE MONOHYDRATE 50 MG/ML
20 INJECTION, SOLUTION INTRAVENOUS ONCE
Status: COMPLETED | OUTPATIENT
Start: 2024-10-15 | End: 2024-10-15

## 2024-10-15 RX ADMIN — DEXAMETHASONE SODIUM PHOSPHATE 10 MG: 10 INJECTION INTRAMUSCULAR; INTRAVENOUS at 09:53

## 2024-10-15 RX ADMIN — DEXTROSE MONOHYDRATE 20 ML/HR: 50 INJECTION, SOLUTION INTRAVENOUS at 09:51

## 2024-10-15 RX ADMIN — FLUOROURACIL 5160 MG: 50 INJECTION, SOLUTION INTRAVENOUS at 12:59

## 2024-10-15 RX ADMIN — FLUOROURACIL 860 MG: 50 INJECTION, SOLUTION INTRAVENOUS at 12:53

## 2024-10-15 RX ADMIN — LEUCOVORIN CALCIUM 860 MG: 500 INJECTION, POWDER, LYOPHILIZED, FOR SOLUTION INTRAMUSCULAR; INTRAVENOUS at 10:30

## 2024-10-15 RX ADMIN — FOSAPREPITANT 150 MG: 150 INJECTION, POWDER, LYOPHILIZED, FOR SOLUTION INTRAVENOUS at 09:55

## 2024-10-15 RX ADMIN — OXALIPLATIN 185 MG: 5 INJECTION, SOLUTION INTRAVENOUS at 10:29

## 2024-10-15 RX ADMIN — PALONOSETRON HYDROCHLORIDE 0.25 MG: 0.25 INJECTION, SOLUTION INTRAVENOUS at 09:51

## 2024-10-15 NOTE — PROGRESS NOTES
[facility] HEMATOLOGY & ONCOLOGY  [address]  [facility] [address] [phone]    Patient Name: Antwan Stevenson  Encounter Date: 10/29/2024  YOB: 1960  Patient Number: [id number]      REASON FOR FOLLOW-UP: Antwan Stevenson is a pleasant 64 y.o.  male who is seen on follow-up for adenocarcinoma the rectum.  He is seen C9D1 of neoadjuvant FOLFOX.    He is seen alone.  History is obtained from patient.  Patient is a reliable historian.           Oncology/Hematology History Overview Note   DIAGNOSTIC ABNORMALITIES:   He presented with hematochezia.  Patient seen by Jose Guadalupe Diez, APRN 3/20/2024.  He reports frequent rectal bleeding.  Patient scheduled for colonoscopy.  CT abdomen pelvis 4/11/2024. Bilateral nephrolithiasis without obstructive uropathy.   Cholelithiasis.   Left pelvic spigelian hernia containing a portion of sigmoid colon.  No bowel obstruction or inflammation. Fatty supraumbilical and bilateral inguinal hernias present as well.  Evidence of prior umbilical hernia repair.  Bibasilar pulmonary micronodules measuring up to 0.4 cm.  Chest CT follow-up in 12 months recommended unless shorter interval follow-up is warranted by history.   Colonoscopy 4/17/2024.  Anal mass. The colon was normal from the rectum to the mid transverse, as I had to use an endoscope due to the severe anal stenosis.  No specimens collected.  The digital rectal exam revealed a firm, hard and obstructing anal mass. The mass was circumferential. Findings: The entire examined colon appeared normal  Pathology report 4/18/2024. 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  Phone note 4/18/2024.  Patient referred to oncology and colorectal surgery at [place].  CEA at 2.1 on 4/24/2024.  Pelvic MRI report on 4/25/2024. 4.5 cm circular solid lower rectum/anal canal neoplasm without evidence of mucinous  features. There is more inferior extent involving the anal canal at the 12:00/12:30 position and 6:00/6:30 position with involvement of the most inferior aspect of the right external anal sphincter. At the level of the anorectal junction there is also posterior extramural extent of tumor into the intersphincteric space abutting and likely involving the external anal sphincter. Two indeterminate right mesorectal lymph nodes.   Note from Dr. Kaylie Stewart 5/1/2024. Plan for total neoadjuvant chemo.  CT chest report 5/3/2024. No definite evidence of metastatic disease in the chest.  Indeterminate 3 mm and 5 mm right lower lobe pulmonary nodules. Recommend a follow-up chest CT in 3 to 6 months to evaluate for stability. Hepatic steatosis. Cholelithiasis. Bilateral nonobstructing renal calculi.           PREVIOUS INTERVENTIONS:   He is undergoing neoadjuvant FOLFOX 5/21/2024 through present.     Rectal cancer   4/24/2024 Initial Diagnosis    Rectal cancer     5/21/2024 -  Chemotherapy    OP COLON [id number] OXALIplatin / Leucovorin / Fluorouracil         PAST MEDICAL HISTORY:  ALLERGIES:  No Known Allergies  CURRENT MEDICATIONS:  Outpatient Encounter Medications as of 10/29/2024   Medication Sig Dispense Refill    acetaminophen (Tylenol) 325 MG tablet Take 3 tablets by mouth Every 8 (Eight) Hours. Take every 8 hours for 3 days then take prn as needed.      albuterol sulfate  (90 Base) MCG/ACT inhaler Inhale 2 puffs Every 6 (Six) Hours As Needed.      HYDROcodone-acetaminophen (NORCO) 5-325 MG per tablet Take 1 tablet by mouth Every 6 (Six) Hours As Needed for Mild Pain or Moderate Pain. (Patient taking differently: Take 0.5 tablets by mouth At Night As Needed for Mild Pain or Moderate Pain (rectal pain from cancer).) 100 tablet 0    ibuprofen (ADVIL,MOTRIN) 200 MG tablet Take 2 tablets by mouth Every 6 (Six) Hours As Needed for Mild Pain.      lidocaine-prilocaine (EMLA) 2.5-2.5 % cream 30 minutes prior to  treatment apply generous amount of Emla Cream to your port site and cover with clear plastic wrap until ready for use 30 g 2    loratadine (CLARITIN) 10 MG tablet Take 1 tablet by mouth Daily.      multivitamin (THERAGRAN) tablet tablet Take 1 tablet by mouth Daily.      ondansetron (Zofran) 4 MG tablet Take 1 tablet by mouth Every 8 (Eight) Hours As Needed for Nausea or Vomiting. 15 tablet 0    ondansetron (Zofran) 8 MG tablet Take 1 tablet by mouth Every 8 (Eight) Hours As Needed for Nausea or Vomiting. 60 tablet 2    predniSONE (DELTASONE) 5 MG tablet Take 1 tablet by mouth Daily.      prochlorperazine (COMPAZINE) 10 MG tablet Take 1 tablet by mouth Every 4 (Four) Hours As Needed for Nausea or Vomiting. 60 tablet 2    temazepam (RESTORIL) 7.5 MG capsule Take 1 capsule by mouth At Night As Needed for Sleep. HAS NOT STARTED THIS Rx      traMADol (ULTRAM) 50 MG tablet Take 1 tablet by mouth Every 6 (Six) Hours As Needed for Moderate Pain. 180 tablet 0     No facility-administered encounter medications on file as of 10/29/2024.     ADULT ILLNESSES:  Patient Active Problem List   Diagnosis Code    Rectal bleed K62.5    Rectal cancer C20    Port-A-Cath in place Z95.828     SURGERIES:  Past Surgical History:   Procedure Laterality Date    COLONOSCOPY N/A 4/17/2024    Procedure: COLONOSCOPY WITH ANESTHESIA;  Surgeon: Salty Arias DO;  Location: [facility] ENDOSCOPY;  Service: Gastroenterology;  Laterality: N/A;  preop; rectal bleed   postop questionable anal mass   PCP Duy Lomeli    HEMORROIDECTOMY      HERNIA REPAIR      VENOUS ACCESS DEVICE (PORT) INSERTION N/A 5/16/2024    Procedure: Single Lumen Port-a-cath insertion with flouroscopy;  Surgeon: Rosa Mccall MD;  Location: [facility] OR;  Service: General;  Laterality: N/A;     HEALTH MAINTENANCE ITEMS:  Health Maintenance Due   Topic Date Due    BMI FOLLOWUP  Never done    TDAP/TD VACCINES (1 - Tdap) Never done    ZOSTER VACCINE (1 of 2) Never done     "Pneumococcal Vaccine 65+ (1 of 1 - PCV) Never done    HEPATITIS C SCREENING  Never done    ANNUAL WELLNESS VISIT  Never done    INFLUENZA VACCINE  08/01/2024    COVID-19 Vaccine (4 - 2023-24 season) 09/01/2024       <no information>  Last Completed Colonoscopy            Discontinued - COLORECTAL CANCER SCREENING  Discontinued        Frequency changed to Never automatically (Topic No Longer Applies)    05/01/2024  Outside Procedure: COLONOSCOPY    04/17/2024  Colonoscopy    04/17/2024  Surgical Procedure: COLONOSCOPY                  Immunization History   Administered Date(s) Administered    COVID-19 (MODERNA) 1st,2nd,3rd Dose Monovalent 04/22/2021, 05/24/2021    COVID-19 (MODERNA) Monovalent Original Booster 01/04/2022     Last Completed Mammogram       This patient has no relevant Health Maintenance data.              FAMILY HISTORY:  Family History   Problem Relation Age of Onset    Colon cancer Neg Hx     Colon polyps Neg Hx     Esophageal cancer Neg Hx      SOCIAL HISTORY:  Social History     Socioeconomic History    Marital status:    Tobacco Use    Smoking status: Never    Smokeless tobacco: Never   Vaping Use    Vaping status: Never Used   Substance and Sexual Activity    Alcohol use: Never    Drug use: Never    Sexual activity: Defer       REVIEW OF SYSTEMS:    Review of Systems   Constitutional:  Negative for fatigue, fever and unexpected weight change.        \"I feel good.\"   HENT:  Negative for hearing loss and mouth sores.    Eyes:  Negative for discharge and redness.   Respiratory:  Negative for shortness of breath and wheezing.    Cardiovascular:  Negative for chest pain and palpitations.   Gastrointestinal:  Negative for constipation, diarrhea, nausea and vomiting.   Endocrine: Negative for cold intolerance and heat intolerance.   Genitourinary:  Negative for dysuria and flank pain.   Musculoskeletal:  Negative for gait problem and myalgias.   Skin:  Negative for pallor. " "  Allergic/Immunologic: Negative for food allergies.   Neurological:  Negative for dizziness and speech difficulty.        Numbness, fingertips.   Hematological:  Negative for adenopathy. Does not bruise/bleed easily.   Psychiatric/Behavioral:  Negative for agitation, confusion and hallucinations.        VITAL SIGNS: /76   Pulse 88   Temp 97.3 °F (36.3 °C)   Resp 18   Ht 167.6 cm (65.98\")   Wt 106 kg (234 lb 4.8 oz)   SpO2 97%   BMI 37.84 kg/m²   Pain Score    10/29/24 0843   PainSc: 0-No pain       PHYSICAL EXAMINATION:     Physical Exam  Vitals reviewed.   Constitutional:       General: He is not in acute distress.  HENT:      Head: Normocephalic and atraumatic.   Eyes:      General: No scleral icterus.  Cardiovascular:      Rate and Rhythm: Normal rate.   Pulmonary:      Effort: No respiratory distress.      Breath sounds: No wheezing.      Comments: Port, right. No erythema.   Abdominal:      General: Bowel sounds are normal.      Palpations: Abdomen is soft.      Tenderness: There is no abdominal tenderness.   Musculoskeletal:         General: No swelling.      Cervical back: Neck supple.   Skin:     Coloration: Skin is not pale.   Neurological:      Mental Status: He is alert and oriented to person, place, and time.   Psychiatric:         Mood and Affect: Mood normal.         Behavior: Behavior normal.         Thought Content: Thought content normal.         Judgment: Judgment normal.         LABS    Lab Results - Last 18 Months   Lab Units 10/15/24  0845 10/08/24  0837 10/01/24  0817 09/17/24  0826 09/10/24  0832 08/27/24  0812 07/30/24  0810 07/22/24  0740 04/24/24  1406 02/29/24  0859   HEMOGLOBIN g/dL 13.2 12.4* 12.8* 12.2* 12.9* 12.2*   < > 13.6   < > 14.2   HEMATOCRIT % 42.6 40.1 40.8 39.7 41.4 39.6   < > 44.1   < > 46.0   MCV fL 84.7 84.4 82.8 86.7 82.8 84.4   < > 84.2   < > 84.4   WBC 10*3/mm3 6.09 4.20 4.71 4.02 5.77 3.86   < > 9.70   < > 6.2   RDW % 15.6* 15.5* 15.8* 15.9* 16.0* 15.4   < " > 17.2*   < > 13.8   MPV fL 9.4 9.4 10.5 10.0 9.4 9.6   < > 10.6   < > 10.1   PLATELETS 10*3/mm3 140 144 87* 150 93* 105*   < > 60*   < > 298   NEUTROS ABS 10*3/mm3 1.88 0.72* 1.33* 1.50* 1.34* 1.62*   < > 7.15*   < > 3.5   LYMPHS ABS K/uL  --   --   --   --   --   --   --   --   --  1.6   MONOS ABS K/uL  --   --   --   --   --   --   --   --   --  1.00*   EOS ABS 10*3/mm3 0.00 0.00 0.00 0.00 0.00 0.00   < > 0.10   < > 0.06   BASOS ABS 10*3/mm3 0.00 0.04 0.00 0.00 0.00 0.00   < > 0.10   < > 0.10   IMMATURE GRANS (ABS) K/uL  --   --   --   --   --   --   --   --   --  0.0   NRBC /100 WBC  --   --   --   --   --   --   --  1.0*  --   --    NEUTROPHIL % % 29.9* 16.2* 28.3* 34.3* 23.2* 41.9*   < > 70.7   < >  --    MONOCYTES % % 20.6* 19.2* 15.2* 25.3* 21.2* 10.5   < > 7.1   < >  --    BASOPHIL % % 0.0 1.0 0.0 0.0 0.0 0.0   < > 1.0   < >  --    ATYP LYMPH % % 21.6* 24.2* 19.2* 9.1* 10.1* 3.5   < > 3.0   < >  --    ANISOCYTOSIS  Slight/1+ Slight/1+ Slight/1+ Slight/1+ Slight/1+ Slight/1+   < > Slight/1+  --   --     < > = values in this interval not displayed.       Lab Results - Last 18 Months   Lab Units 10/15/24  0845 10/08/24  0837 10/01/24  0817 09/17/24  0826 09/10/24  0832 08/27/24  0812   GLUCOSE mg/dL 84 129* 120* 97 98 136*   SODIUM mmol/L 138 138 138 142 139 139   POTASSIUM mmol/L 4.0 3.9 4.0 4.2 4.6 3.9   CO2 mmol/L 28.0 25.0 24.0 27.0 28.0 25.0   CHLORIDE mmol/L 102 103 103 107 104 105   ANION GAP mmol/L 8.0 10.0 11.0 8.0 7.0 9.0   CREATININE mg/dL 0.87 0.86 0.93 0.72* 1.01 0.92   BUN mg/dL 10 6* 10 8 12 7*   BUN / CREAT RATIO  11.5 7.0 10.8 11.1 11.9 7.6   CALCIUM mg/dL 9.5 9.2 9.4 9.4 9.2 8.8   ALK PHOS U/L 102 101 105 90 101 98   TOTAL PROTEIN g/dL 7.8 7.4 7.4 7.1 7.8 7.2   ALT (SGPT) U/L 16 15 16 14 19 14   AST (SGOT) U/L 25 26 29 27 30 26   BILIRUBIN mg/dL 0.4 0.3 0.5 0.3 0.7 0.4   ALBUMIN g/dL 3.9 3.8 3.9 3.8 4.0 3.8   GLOBULIN gm/dL 3.9 3.6 3.5 3.3 3.8 3.4       Lab Results - Last 18 Months   Lab  "Units 04/24/24  1406   CEA ng/mL 2.10       Lab Results - Last 18 Months   Lab Units 07/16/24  1459 06/18/24  0744 06/04/24  0722 02/29/24  0859   IRON mcg/dL 29*  --  97  --    TIBC mcg/dL 335  --  328  --    IRON SATURATION (TSAT) % 9*  --  30  --    FERRITIN ng/mL 1,735.00*  --  263.00  --    TSH uIU/mL  --   --   --  2.460   FOLATE ng/mL  --  >20.00  --   --        Antwan Stevenson reports a pain score of 0.          ASSESSMENT:  Adenocarcinoma the rectum. KIKE on 4/28/2024.  AJCC stage:IIIb (cT3, cN1b, cM0)  Treatment status: Patient is undergoing neoadjuvant FOLFOX from 5/21/2024 through present.  To be followed by CIV 5FU with radiation.   2.  Performance status of 0.  3.  Grade 2 neutropenia without fever from chemo.  Pegfilgrastim ordered as prophylaxis.  4.  Normocytic anemia secondary to chemo.  5.  Grade 1 neuropathy, observe.     6.  Elevated liver function test.  CT abdomen 8/2/2024 showed no liver metastasis.  7.  Neutrophilia from pegfilgrastim.  8.  Bibasilar pulmonary micronodules measuring up to 0.4 cm on 4/11/2024.  On observation.  9.  Thrombocytopenia from chemo.  Observation.  10. ?Interstitial granulomatous dermatitis with arthritis. On prednisone 5 mg daily.          PLAN:   Re: Tolerance to neoadjuvant FOLFOX.  \"Felt horrible after. Just don't feel right. No nausea or vomiting.\"  2.   Re:  Heme status.  WBC latest 6.09, ANC 1.88, hemoglobin 13.2, hematocrit 42.6, MCV 84.7 and platelet 140. No dose reduction of oxaliplatin at this point.   3.   Re:  CMP. GFR AST latest 25 from 26 and alkaline phosphatase 102 from 101.  4.   Re:  Magnesium latest 2.2.  5.   Re:  Grade 1 neuropathy under observation.   6.   Re:  IVF support as needed on D3 secondary to nausea and worsening fatigue.  7.  Continue total neoadjuvant FOLFOX if ANC at least 1.5.  Then he will undergo neoadjuvant CIV 5-FU with radiation.   8.  Plan for 12 cycles of neoadjuvant FOLFOX:  9.  " "Monitor for potential anaphylaxis, infusion reactions, nausea, vomiting, diarrhea, cold intolerance, recurrent myelosuppression, alopecia or neuropathy.  10.  Schedule treatment C9 D1 to 3 on 10/29/2024, C10 D1 to D3 on 11/12/2024 and C11 on 12/3/2024.  To be administered every 2 weeks:   Oxaliplatin 85 mg/m2 IVPB.  Leucovorin 400 mg/m2.  5- mg/ m2  IVP.  Begin 5-FU 2,400 mg/m2 IVPB over 46 hours.    11.    Premedicate with:  Aloxi 0.25 mg IVP.  Fosaprepitant 150 mg IV.  Decadron 12 mg IVPB over 20-30 min.  12.  He declined peg filgrastim D3 as primary prophylaxis. \"Hurts my liver.\" Cycle 3 on 6/18/2024 delayed due to ANC of 1.2.  Monitor for arthralgias. C7 delayed 9/10/2024 due to ANC of 1.3.    13.   Obtain weekly CBC with differential, magnesium, and CMP with FOLFOX.  14.  eRx ondansetron 8 mg p.o. every 8 hours as needed for nausea and vomiting #60, 2 refills if needed.  15.  eRx prochlorperazine 10 mg p.o. every 4 hours as needed for nausea and vomiting #60, 2 refills if needed.  16.  eRx tramadol 50 mg p.o. every 6 hours as needed for pain, #180, 0 refill if needed.  17.  Continue care per primary care physician and the other specialists.  18.  Plan of care discussed with patient.  Understanding expressed.  Patient agreed to proceed.  19.  Advance Care Planning  ACP discussion was declined by the patient. Patient does not have an advance directive, information provided.    20. Return to office 12/3/2024 for C11. Plan to repeat MRI pelvis and CT chest after neoadjuvant chemo.             I have reviewed the assessment and plan and verified the accuracy of it. No changes to assessment and plan since the information was documented. Raul Mayberry MD 10/29/24         I spent 41 total minutes, face-to-face, caring for Antwan today. Greater than 50% of this time involved counseling and/or coordination of care as documented within this note.                 (Salty Arias DO)  (Kaylie Stewart MD)   (Marty" MD [facility] [place] [facility].  (Bruno Sanford MD)  (uDy Lomeli MD)

## 2024-10-17 ENCOUNTER — INFUSION (OUTPATIENT)
Dept: ONCOLOGY | Facility: HOSPITAL | Age: 64
End: 2024-10-17
Payer: MEDICARE

## 2024-10-17 VITALS
WEIGHT: 239 LBS | HEIGHT: 66 IN | HEART RATE: 70 BPM | SYSTOLIC BLOOD PRESSURE: 127 MMHG | RESPIRATION RATE: 18 BRPM | TEMPERATURE: 97 F | OXYGEN SATURATION: 96 % | DIASTOLIC BLOOD PRESSURE: 69 MMHG | BODY MASS INDEX: 38.41 KG/M2

## 2024-10-17 DIAGNOSIS — Z95.828 PORT-A-CATH IN PLACE: ICD-10-CM

## 2024-10-17 DIAGNOSIS — C20 RECTAL CANCER: Primary | ICD-10-CM

## 2024-10-17 PROCEDURE — 25010000002 HEPARIN LOCK FLUSH PER 10 UNITS: Performed by: INTERNAL MEDICINE

## 2024-10-17 RX ORDER — HEPARIN SODIUM (PORCINE) LOCK FLUSH IV SOLN 100 UNIT/ML 100 UNIT/ML
500 SOLUTION INTRAVENOUS AS NEEDED
Status: DISCONTINUED | OUTPATIENT
Start: 2024-10-17 | End: 2024-10-17 | Stop reason: HOSPADM

## 2024-10-17 RX ORDER — SODIUM CHLORIDE 0.9 % (FLUSH) 0.9 %
10 SYRINGE (ML) INJECTION AS NEEDED
OUTPATIENT
Start: 2024-10-17

## 2024-10-17 RX ORDER — HEPARIN SODIUM (PORCINE) LOCK FLUSH IV SOLN 100 UNIT/ML 100 UNIT/ML
500 SOLUTION INTRAVENOUS AS NEEDED
OUTPATIENT
Start: 2024-10-17

## 2024-10-17 RX ORDER — SODIUM CHLORIDE 0.9 % (FLUSH) 0.9 %
10 SYRINGE (ML) INJECTION AS NEEDED
Status: DISCONTINUED | OUTPATIENT
Start: 2024-10-17 | End: 2024-10-17 | Stop reason: HOSPADM

## 2024-10-17 RX ADMIN — HEPARIN 500 UNITS: 100 SYRINGE at 11:53

## 2024-10-17 RX ADMIN — Medication 10 ML: at 11:53

## 2024-10-23 DIAGNOSIS — Z95.828 PORT-A-CATH IN PLACE: ICD-10-CM

## 2024-10-23 DIAGNOSIS — C20 RECTAL CANCER: Primary | ICD-10-CM

## 2024-10-23 RX ORDER — PALONOSETRON 0.05 MG/ML
0.25 INJECTION, SOLUTION INTRAVENOUS ONCE
OUTPATIENT
Start: 2024-10-29

## 2024-10-23 RX ORDER — DIPHENHYDRAMINE HYDROCHLORIDE 50 MG/ML
50 INJECTION INTRAMUSCULAR; INTRAVENOUS AS NEEDED
OUTPATIENT
Start: 2024-10-29

## 2024-10-23 RX ORDER — DEXTROSE MONOHYDRATE 50 MG/ML
20 INJECTION, SOLUTION INTRAVENOUS ONCE
OUTPATIENT
Start: 2024-10-29

## 2024-10-23 RX ORDER — FLUOROURACIL 50 MG/ML
400 INJECTION, SOLUTION INTRAVENOUS ONCE
OUTPATIENT
Start: 2024-10-29

## 2024-10-23 RX ORDER — FAMOTIDINE 10 MG/ML
20 INJECTION, SOLUTION INTRAVENOUS AS NEEDED
OUTPATIENT
Start: 2024-10-29

## 2024-10-23 RX ORDER — DEXAMETHASONE SODIUM PHOSPHATE 10 MG/ML
10 INJECTION INTRAMUSCULAR; INTRAVENOUS ONCE
Start: 2024-10-29 | End: 2024-10-29

## 2024-10-29 ENCOUNTER — TELEPHONE (OUTPATIENT)
Dept: ONCOLOGY | Facility: CLINIC | Age: 64
End: 2024-10-29

## 2024-10-29 ENCOUNTER — LAB (OUTPATIENT)
Dept: LAB | Facility: HOSPITAL | Age: 64
End: 2024-10-29
Payer: MEDICARE

## 2024-10-29 ENCOUNTER — INFUSION (OUTPATIENT)
Dept: ONCOLOGY | Facility: HOSPITAL | Age: 64
End: 2024-10-29
Payer: MEDICARE

## 2024-10-29 ENCOUNTER — OFFICE VISIT (OUTPATIENT)
Dept: ONCOLOGY | Facility: CLINIC | Age: 64
End: 2024-10-29
Payer: MEDICARE

## 2024-10-29 VITALS
HEART RATE: 88 BPM | DIASTOLIC BLOOD PRESSURE: 76 MMHG | HEIGHT: 66 IN | TEMPERATURE: 97.3 F | BODY MASS INDEX: 37.66 KG/M2 | OXYGEN SATURATION: 97 % | SYSTOLIC BLOOD PRESSURE: 140 MMHG | WEIGHT: 234.3 LBS | RESPIRATION RATE: 18 BRPM

## 2024-10-29 VITALS
DIASTOLIC BLOOD PRESSURE: 77 MMHG | RESPIRATION RATE: 19 BRPM | SYSTOLIC BLOOD PRESSURE: 138 MMHG | TEMPERATURE: 97.8 F | HEART RATE: 89 BPM | OXYGEN SATURATION: 95 %

## 2024-10-29 DIAGNOSIS — C20 RECTAL CANCER: Primary | ICD-10-CM

## 2024-10-29 DIAGNOSIS — Z95.828 PORT-A-CATH IN PLACE: ICD-10-CM

## 2024-10-29 LAB
ALBUMIN SERPL-MCNC: 4 G/DL (ref 3.5–5.2)
ALBUMIN/GLOB SERPL: 1.1 G/DL
ALP SERPL-CCNC: 104 U/L (ref 39–117)
ALT SERPL W P-5'-P-CCNC: 21 U/L (ref 1–41)
ANION GAP SERPL CALCULATED.3IONS-SCNC: 10 MMOL/L (ref 5–15)
ANISOCYTOSIS BLD QL: ABNORMAL
AST SERPL-CCNC: 31 U/L (ref 1–40)
BASOPHILS # BLD MANUAL: 0 10*3/MM3 (ref 0–0.2)
BASOPHILS NFR BLD MANUAL: 0 % (ref 0–1.5)
BILIRUB SERPL-MCNC: 0.5 MG/DL (ref 0–1.2)
BUN SERPL-MCNC: 9 MG/DL (ref 8–23)
BUN/CREAT SERPL: 9.6 (ref 7–25)
CALCIUM SPEC-SCNC: 9.4 MG/DL (ref 8.6–10.5)
CHLORIDE SERPL-SCNC: 104 MMOL/L (ref 98–107)
CO2 SERPL-SCNC: 24 MMOL/L (ref 22–29)
CREAT SERPL-MCNC: 0.94 MG/DL (ref 0.76–1.27)
DEPRECATED RDW RBC AUTO: 47.8 FL (ref 37–54)
EGFRCR SERPLBLD CKD-EPI 2021: 89.4 ML/MIN/1.73
EOSINOPHIL # BLD MANUAL: 0 10*3/MM3 (ref 0–0.4)
EOSINOPHIL NFR BLD MANUAL: 0 % (ref 0.3–6.2)
ERYTHROCYTE [DISTWIDTH] IN BLOOD BY AUTOMATED COUNT: 15.7 % (ref 12.3–15.4)
GLOBULIN UR ELPH-MCNC: 3.5 GM/DL
GLUCOSE SERPL-MCNC: 118 MG/DL (ref 65–99)
HCT VFR BLD AUTO: 41.6 % (ref 37.5–51)
HGB BLD-MCNC: 12.9 G/DL (ref 13–17.7)
HOLD SPECIMEN: NORMAL
LYMPHOCYTES # BLD MANUAL: 1.85 10*3/MM3 (ref 0.7–3.1)
LYMPHOCYTES NFR BLD MANUAL: 14.1 % (ref 5–12)
MAGNESIUM SERPL-MCNC: 2.2 MG/DL (ref 1.6–2.4)
MCH RBC QN AUTO: 26.1 PG (ref 26.6–33)
MCHC RBC AUTO-ENTMCNC: 31 G/DL (ref 31.5–35.7)
MCV RBC AUTO: 84.2 FL (ref 79–97)
MONOCYTES # BLD: 0.52 10*3/MM3 (ref 0.1–0.9)
NEUTROPHILS # BLD AUTO: 1.26 10*3/MM3 (ref 1.7–7)
NEUTROPHILS NFR BLD MANUAL: 34.3 % (ref 42.7–76)
PLASMA CELL PREC NFR BLD MANUAL: 1 % (ref 0–0)
PLATELET # BLD AUTO: 94 10*3/MM3 (ref 140–450)
PMV BLD AUTO: 9.9 FL (ref 6–12)
POLYCHROMASIA BLD QL SMEAR: ABNORMAL
POTASSIUM SERPL-SCNC: 3.7 MMOL/L (ref 3.5–5.2)
PROT SERPL-MCNC: 7.5 G/DL (ref 6–8.5)
RBC # BLD AUTO: 4.94 10*6/MM3 (ref 4.14–5.8)
SMALL PLATELETS BLD QL SMEAR: ABNORMAL
SODIUM SERPL-SCNC: 138 MMOL/L (ref 136–145)
VARIANT LYMPHS NFR BLD MANUAL: 13.1 % (ref 0–5)
VARIANT LYMPHS NFR BLD MANUAL: 37.4 % (ref 19.6–45.3)
WBC MORPH BLD: NORMAL
WBC NRBC COR # BLD AUTO: 3.66 10*3/MM3 (ref 3.4–10.8)

## 2024-10-29 PROCEDURE — G0463 HOSPITAL OUTPT CLINIC VISIT: HCPCS

## 2024-10-29 PROCEDURE — 80053 COMPREHEN METABOLIC PANEL: CPT

## 2024-10-29 PROCEDURE — 83735 ASSAY OF MAGNESIUM: CPT

## 2024-10-29 PROCEDURE — 85025 COMPLETE CBC W/AUTO DIFF WBC: CPT

## 2024-10-29 PROCEDURE — 36415 COLL VENOUS BLD VENIPUNCTURE: CPT

## 2024-10-29 PROCEDURE — 85007 BL SMEAR W/DIFF WBC COUNT: CPT

## 2024-10-29 RX ORDER — DEXTROSE MONOHYDRATE 50 MG/ML
20 INJECTION, SOLUTION INTRAVENOUS ONCE
OUTPATIENT
Start: 2024-11-12

## 2024-10-29 RX ORDER — FAMOTIDINE 10 MG/ML
20 INJECTION, SOLUTION INTRAVENOUS AS NEEDED
OUTPATIENT
Start: 2024-11-12

## 2024-10-29 RX ORDER — PALONOSETRON 0.05 MG/ML
0.25 INJECTION, SOLUTION INTRAVENOUS ONCE
OUTPATIENT
Start: 2024-11-12

## 2024-10-29 RX ORDER — DEXAMETHASONE SODIUM PHOSPHATE 10 MG/ML
10 INJECTION INTRAMUSCULAR; INTRAVENOUS ONCE
Start: 2024-11-12 | End: 2024-11-12

## 2024-10-29 RX ORDER — DIPHENHYDRAMINE HYDROCHLORIDE 50 MG/ML
50 INJECTION INTRAMUSCULAR; INTRAVENOUS AS NEEDED
OUTPATIENT
Start: 2024-11-12

## 2024-10-29 RX ORDER — FLUOROURACIL 50 MG/ML
400 INJECTION, SOLUTION INTRAVENOUS ONCE
OUTPATIENT
Start: 2024-11-12

## 2024-10-29 NOTE — PROGRESS NOTES
Message to office: Antwan Stevenson [birth year] pt already held today for ANC and PLT so he left but just fyi he has been having photosensitivity since tx, has some difficulty starting to urinate sometimes, and rash to the face.

## 2024-10-29 NOTE — PROGRESS NOTES
[id number] Contacted Nicki with MD office r/t labs. Per Dr Mayberry hold treatment ANC 1.26, move out x1 week. IRWIN Villeda RN

## 2024-11-05 ENCOUNTER — TELEPHONE (OUTPATIENT)
Dept: ONCOLOGY | Facility: CLINIC | Age: 64
End: 2024-11-05
Payer: MEDICARE

## 2024-11-05 ENCOUNTER — LAB (OUTPATIENT)
Dept: LAB | Facility: HOSPITAL | Age: 64
End: 2024-11-05
Payer: MEDICARE

## 2024-11-05 ENCOUNTER — INFUSION (OUTPATIENT)
Dept: ONCOLOGY | Facility: HOSPITAL | Age: 64
End: 2024-11-05
Payer: MEDICARE

## 2024-11-05 VITALS
SYSTOLIC BLOOD PRESSURE: 135 MMHG | RESPIRATION RATE: 16 BRPM | TEMPERATURE: 96.5 F | WEIGHT: 235 LBS | DIASTOLIC BLOOD PRESSURE: 79 MMHG | BODY MASS INDEX: 37.77 KG/M2 | HEIGHT: 66 IN | HEART RATE: 88 BPM | OXYGEN SATURATION: 95 %

## 2024-11-05 DIAGNOSIS — C20 RECTAL CANCER: Primary | ICD-10-CM

## 2024-11-05 DIAGNOSIS — C20 RECTAL CANCER: ICD-10-CM

## 2024-11-05 DIAGNOSIS — Z95.828 PORT-A-CATH IN PLACE: ICD-10-CM

## 2024-11-05 LAB
ALBUMIN SERPL-MCNC: 4.2 G/DL (ref 3.5–5.2)
ALBUMIN/GLOB SERPL: 1.1 G/DL
ALP SERPL-CCNC: 111 U/L (ref 39–117)
ALT SERPL W P-5'-P-CCNC: 21 U/L (ref 1–41)
ANION GAP SERPL CALCULATED.3IONS-SCNC: 9 MMOL/L (ref 5–15)
ANISOCYTOSIS BLD QL: ABNORMAL
AST SERPL-CCNC: 35 U/L (ref 1–40)
BASOPHILS # BLD MANUAL: 0 10*3/MM3 (ref 0–0.2)
BASOPHILS NFR BLD MANUAL: 0 % (ref 0–1.5)
BILIRUB SERPL-MCNC: 0.3 MG/DL (ref 0–1.2)
BUN SERPL-MCNC: 9 MG/DL (ref 8–23)
BUN/CREAT SERPL: 9.6 (ref 7–25)
CALCIUM SPEC-SCNC: 9.8 MG/DL (ref 8.6–10.5)
CHLORIDE SERPL-SCNC: 103 MMOL/L (ref 98–107)
CO2 SERPL-SCNC: 28 MMOL/L (ref 22–29)
CREAT SERPL-MCNC: 0.94 MG/DL (ref 0.76–1.27)
DEPRECATED RDW RBC AUTO: 48.6 FL (ref 37–54)
EGFRCR SERPLBLD CKD-EPI 2021: 89.4 ML/MIN/1.73
EOSINOPHIL # BLD MANUAL: 0 10*3/MM3 (ref 0–0.4)
EOSINOPHIL NFR BLD MANUAL: 0 % (ref 0.3–6.2)
ERYTHROCYTE [DISTWIDTH] IN BLOOD BY AUTOMATED COUNT: 15.9 % (ref 12.3–15.4)
GLOBULIN UR ELPH-MCNC: 3.9 GM/DL
GLUCOSE SERPL-MCNC: 81 MG/DL (ref 65–99)
HCT VFR BLD AUTO: 43 % (ref 37.5–51)
HGB BLD-MCNC: 13.7 G/DL (ref 13–17.7)
LYMPHOCYTES # BLD MANUAL: 3.25 10*3/MM3 (ref 0.7–3.1)
LYMPHOCYTES NFR BLD MANUAL: 21.4 % (ref 5–12)
MAGNESIUM SERPL-MCNC: 2.3 MG/DL (ref 1.6–2.4)
MCH RBC QN AUTO: 26.9 PG (ref 26.6–33)
MCHC RBC AUTO-ENTMCNC: 31.9 G/DL (ref 31.5–35.7)
MCV RBC AUTO: 84.5 FL (ref 79–97)
MONOCYTES # BLD: 1.28 10*3/MM3 (ref 0.1–0.9)
NEUTROPHILS # BLD AUTO: 1.47 10*3/MM3 (ref 1.7–7)
NEUTROPHILS NFR BLD MANUAL: 24.5 % (ref 42.7–76)
PLAT MORPH BLD: NORMAL
PLATELET # BLD AUTO: 192 10*3/MM3 (ref 140–450)
PMV BLD AUTO: 9.3 FL (ref 6–12)
POLYCHROMASIA BLD QL SMEAR: ABNORMAL
POTASSIUM SERPL-SCNC: 4.2 MMOL/L (ref 3.5–5.2)
PROT SERPL-MCNC: 8.1 G/DL (ref 6–8.5)
RBC # BLD AUTO: 5.09 10*6/MM3 (ref 4.14–5.8)
SODIUM SERPL-SCNC: 140 MMOL/L (ref 136–145)
VARIANT LYMPHS NFR BLD MANUAL: 23.5 % (ref 0–5)
VARIANT LYMPHS NFR BLD MANUAL: 30.6 % (ref 19.6–45.3)
WBC MORPH BLD: NORMAL
WBC NRBC COR # BLD AUTO: 6 10*3/MM3 (ref 3.4–10.8)

## 2024-11-05 PROCEDURE — 83735 ASSAY OF MAGNESIUM: CPT

## 2024-11-05 PROCEDURE — 80053 COMPREHEN METABOLIC PANEL: CPT

## 2024-11-05 PROCEDURE — 85025 COMPLETE CBC W/AUTO DIFF WBC: CPT

## 2024-11-05 PROCEDURE — G0463 HOSPITAL OUTPT CLINIC VISIT: HCPCS

## 2024-11-05 PROCEDURE — 36415 COLL VENOUS BLD VENIPUNCTURE: CPT

## 2024-11-05 PROCEDURE — 85007 BL SMEAR W/DIFF WBC COUNT: CPT

## 2024-11-05 NOTE — PROGRESS NOTES
Per Dr. Mayberry hold treatment and retry next week.  Amarilis is scheduling appointment.  Pt agreeable to plan.

## 2024-11-12 ENCOUNTER — LAB (OUTPATIENT)
Dept: LAB | Facility: HOSPITAL | Age: 64
End: 2024-11-12
Payer: MEDICARE

## 2024-11-12 ENCOUNTER — INFUSION (OUTPATIENT)
Dept: ONCOLOGY | Facility: HOSPITAL | Age: 64
End: 2024-11-12
Payer: MEDICARE

## 2024-11-12 VITALS
OXYGEN SATURATION: 92 % | RESPIRATION RATE: 18 BRPM | HEIGHT: 66 IN | BODY MASS INDEX: 38.09 KG/M2 | SYSTOLIC BLOOD PRESSURE: 142 MMHG | TEMPERATURE: 97 F | DIASTOLIC BLOOD PRESSURE: 71 MMHG | HEART RATE: 67 BPM | WEIGHT: 237 LBS

## 2024-11-12 DIAGNOSIS — C20 RECTAL CANCER: Primary | ICD-10-CM

## 2024-11-12 DIAGNOSIS — Z95.828 PORT-A-CATH IN PLACE: ICD-10-CM

## 2024-11-12 DIAGNOSIS — C20 RECTAL CANCER: ICD-10-CM

## 2024-11-12 LAB
ALBUMIN SERPL-MCNC: 3.9 G/DL (ref 3.5–5.2)
ALBUMIN/GLOB SERPL: 1.1 G/DL
ALP SERPL-CCNC: 107 U/L (ref 39–117)
ALT SERPL W P-5'-P-CCNC: 21 U/L (ref 1–41)
ANION GAP SERPL CALCULATED.3IONS-SCNC: 9 MMOL/L (ref 5–15)
ANISOCYTOSIS BLD QL: ABNORMAL
AST SERPL-CCNC: 29 U/L (ref 1–40)
BASOPHILS # BLD MANUAL: 0 10*3/MM3 (ref 0–0.2)
BASOPHILS NFR BLD MANUAL: 0 % (ref 0–1.5)
BILIRUB SERPL-MCNC: 0.4 MG/DL (ref 0–1.2)
BUN SERPL-MCNC: 7 MG/DL (ref 8–23)
BUN/CREAT SERPL: 8.4 (ref 7–25)
CALCIUM SPEC-SCNC: 9.5 MG/DL (ref 8.6–10.5)
CHLORIDE SERPL-SCNC: 102 MMOL/L (ref 98–107)
CO2 SERPL-SCNC: 28 MMOL/L (ref 22–29)
CREAT SERPL-MCNC: 0.83 MG/DL (ref 0.76–1.27)
DEPRECATED RDW RBC AUTO: 48 FL (ref 37–54)
EGFRCR SERPLBLD CKD-EPI 2021: 96.5 ML/MIN/1.73
EOSINOPHIL # BLD MANUAL: 0 10*3/MM3 (ref 0–0.4)
EOSINOPHIL NFR BLD MANUAL: 0 % (ref 0.3–6.2)
ERYTHROCYTE [DISTWIDTH] IN BLOOD BY AUTOMATED COUNT: 15.4 % (ref 12.3–15.4)
GLOBULIN UR ELPH-MCNC: 3.5 GM/DL
GLUCOSE SERPL-MCNC: 105 MG/DL (ref 65–99)
HCT VFR BLD AUTO: 43.1 % (ref 37.5–51)
HGB BLD-MCNC: 13.3 G/DL (ref 13–17.7)
LYMPHOCYTES # BLD MANUAL: 3.05 10*3/MM3 (ref 0.7–3.1)
LYMPHOCYTES NFR BLD MANUAL: 22 % (ref 5–12)
MAGNESIUM SERPL-MCNC: 2.1 MG/DL (ref 1.6–2.4)
MCH RBC QN AUTO: 26.3 PG (ref 26.6–33)
MCHC RBC AUTO-ENTMCNC: 30.9 G/DL (ref 31.5–35.7)
MCV RBC AUTO: 85.2 FL (ref 79–97)
MICROCYTES BLD QL: ABNORMAL
MONOCYTES # BLD: 1.34 10*3/MM3 (ref 0.1–0.9)
NEUTROPHILS # BLD AUTO: 1.71 10*3/MM3 (ref 1.7–7)
NEUTROPHILS NFR BLD MANUAL: 28 % (ref 42.7–76)
PLAT MORPH BLD: NORMAL
PLATELET # BLD AUTO: 174 10*3/MM3 (ref 140–450)
PMV BLD AUTO: 9.6 FL (ref 6–12)
POIKILOCYTOSIS BLD QL SMEAR: ABNORMAL
POTASSIUM SERPL-SCNC: 4.3 MMOL/L (ref 3.5–5.2)
PROT SERPL-MCNC: 7.4 G/DL (ref 6–8.5)
RBC # BLD AUTO: 5.06 10*6/MM3 (ref 4.14–5.8)
SODIUM SERPL-SCNC: 139 MMOL/L (ref 136–145)
STOMATOCYTES BLD QL SMEAR: ABNORMAL
VARIANT LYMPHS NFR BLD MANUAL: 17 % (ref 0–5)
VARIANT LYMPHS NFR BLD MANUAL: 33 % (ref 19.6–45.3)
WBC MORPH BLD: NORMAL
WBC NRBC COR # BLD AUTO: 6.1 10*3/MM3 (ref 3.4–10.8)

## 2024-11-12 PROCEDURE — 25010000002 LEUCOVORIN 200 MG RECONSTITUTED SOLUTION 200 MG VIAL: Performed by: INTERNAL MEDICINE

## 2024-11-12 PROCEDURE — 25010000002 DEXAMETHASONE PER 1 MG: Performed by: INTERNAL MEDICINE

## 2024-11-12 PROCEDURE — 96411 CHEMO IV PUSH ADDL DRUG: CPT

## 2024-11-12 PROCEDURE — 36415 COLL VENOUS BLD VENIPUNCTURE: CPT

## 2024-11-12 PROCEDURE — 25010000002 LEUCOVORIN CALCIUM PER 50 MG: Performed by: INTERNAL MEDICINE

## 2024-11-12 PROCEDURE — 63710000001 APREPITANT PER 5 MG: Performed by: INTERNAL MEDICINE

## 2024-11-12 PROCEDURE — 96415 CHEMO IV INFUSION ADDL HR: CPT

## 2024-11-12 PROCEDURE — 96375 TX/PRO/DX INJ NEW DRUG ADDON: CPT

## 2024-11-12 PROCEDURE — 96368 THER/DIAG CONCURRENT INF: CPT

## 2024-11-12 PROCEDURE — 85025 COMPLETE CBC W/AUTO DIFF WBC: CPT

## 2024-11-12 PROCEDURE — 85007 BL SMEAR W/DIFF WBC COUNT: CPT

## 2024-11-12 PROCEDURE — 83735 ASSAY OF MAGNESIUM: CPT

## 2024-11-12 PROCEDURE — 80053 COMPREHEN METABOLIC PANEL: CPT

## 2024-11-12 PROCEDURE — 96413 CHEMO IV INFUSION 1 HR: CPT

## 2024-11-12 PROCEDURE — 25010000003 DEXTROSE 5 % SOLUTION 250 ML FLEX CONT: Performed by: INTERNAL MEDICINE

## 2024-11-12 PROCEDURE — 25010000003 DEXTROSE 5 % SOLUTION: Performed by: INTERNAL MEDICINE

## 2024-11-12 PROCEDURE — 25010000002 PALONOSETRON PER 25 MCG: Performed by: INTERNAL MEDICINE

## 2024-11-12 PROCEDURE — 96366 THER/PROPH/DIAG IV INF ADDON: CPT

## 2024-11-12 PROCEDURE — 25010000002 OXALIPLATIN PER 0.5 MG: Performed by: INTERNAL MEDICINE

## 2024-11-12 PROCEDURE — 25010000002 FLUOROURACIL PER 500 MG: Performed by: INTERNAL MEDICINE

## 2024-11-12 PROCEDURE — G0498 CHEMO EXTEND IV INFUS W/PUMP: HCPCS

## 2024-11-12 RX ORDER — SODIUM CHLORIDE 0.9 % (FLUSH) 0.9 %
10 SYRINGE (ML) INJECTION AS NEEDED
Status: DISCONTINUED | OUTPATIENT
Start: 2024-11-12 | End: 2024-11-12 | Stop reason: HOSPADM

## 2024-11-12 RX ORDER — HEPARIN SODIUM (PORCINE) LOCK FLUSH IV SOLN 100 UNIT/ML 100 UNIT/ML
500 SOLUTION INTRAVENOUS AS NEEDED
Status: DISCONTINUED | OUTPATIENT
Start: 2024-11-12 | End: 2024-11-12 | Stop reason: HOSPADM

## 2024-11-12 RX ORDER — FAMOTIDINE 10 MG/ML
20 INJECTION, SOLUTION INTRAVENOUS AS NEEDED
Status: DISCONTINUED | OUTPATIENT
Start: 2024-11-12 | End: 2024-11-12 | Stop reason: HOSPADM

## 2024-11-12 RX ORDER — APREPITANT 80 MG/1
80 CAPSULE ORAL DAILY
Status: DISCONTINUED | OUTPATIENT
Start: 2024-11-12 | End: 2024-11-12 | Stop reason: HOSPADM

## 2024-11-12 RX ORDER — FLUOROURACIL 50 MG/ML
400 INJECTION, SOLUTION INTRAVENOUS ONCE
Status: COMPLETED | OUTPATIENT
Start: 2024-11-12 | End: 2024-11-12

## 2024-11-12 RX ORDER — DEXTROSE MONOHYDRATE 50 MG/ML
20 INJECTION, SOLUTION INTRAVENOUS ONCE
Status: COMPLETED | OUTPATIENT
Start: 2024-11-12 | End: 2024-11-12

## 2024-11-12 RX ORDER — SODIUM CHLORIDE 0.9 % (FLUSH) 0.9 %
10 SYRINGE (ML) INJECTION AS NEEDED
Status: CANCELLED | OUTPATIENT
Start: 2024-11-12

## 2024-11-12 RX ORDER — HEPARIN SODIUM (PORCINE) LOCK FLUSH IV SOLN 100 UNIT/ML 100 UNIT/ML
500 SOLUTION INTRAVENOUS AS NEEDED
Status: CANCELLED | OUTPATIENT
Start: 2024-11-12

## 2024-11-12 RX ORDER — DEXAMETHASONE SODIUM PHOSPHATE 10 MG/ML
10 INJECTION INTRAMUSCULAR; INTRAVENOUS ONCE
Status: COMPLETED | OUTPATIENT
Start: 2024-11-12 | End: 2024-11-12

## 2024-11-12 RX ORDER — PALONOSETRON 0.05 MG/ML
0.25 INJECTION, SOLUTION INTRAVENOUS ONCE
Status: COMPLETED | OUTPATIENT
Start: 2024-11-12 | End: 2024-11-12

## 2024-11-12 RX ORDER — DIPHENHYDRAMINE HYDROCHLORIDE 50 MG/ML
50 INJECTION INTRAMUSCULAR; INTRAVENOUS AS NEEDED
Status: DISCONTINUED | OUTPATIENT
Start: 2024-11-12 | End: 2024-11-12 | Stop reason: HOSPADM

## 2024-11-12 RX ADMIN — DEXTROSE MONOHYDRATE 20 ML/HR: 50 INJECTION, SOLUTION INTRAVENOUS at 10:27

## 2024-11-12 RX ADMIN — PALONOSETRON HYDROCHLORIDE 0.25 MG: 0.25 INJECTION, SOLUTION INTRAVENOUS at 10:15

## 2024-11-12 RX ADMIN — FLUOROURACIL 860 MG: 50 INJECTION, SOLUTION INTRAVENOUS at 13:34

## 2024-11-12 RX ADMIN — LEUCOVORIN CALCIUM 860 MG: 350 INJECTION, POWDER, LYOPHILIZED, FOR SUSPENSION INTRAMUSCULAR; INTRAVENOUS at 11:21

## 2024-11-12 RX ADMIN — DEXAMETHASONE SODIUM PHOSPHATE 10 MG: 10 INJECTION INTRAMUSCULAR; INTRAVENOUS at 10:15

## 2024-11-12 RX ADMIN — OXALIPLATIN 185 MG: 5 INJECTION, SOLUTION, CONCENTRATE INTRAVENOUS at 11:20

## 2024-11-12 RX ADMIN — FLUOROURACIL 5160 MG: 50 INJECTION, SOLUTION INTRAVENOUS at 13:40

## 2024-11-12 RX ADMIN — APREPITANT 80 MG: 80 CAPSULE ORAL at 10:15

## 2024-11-14 ENCOUNTER — INFUSION (OUTPATIENT)
Dept: ONCOLOGY | Facility: HOSPITAL | Age: 64
End: 2024-11-14
Payer: MEDICARE

## 2024-11-14 VITALS
HEART RATE: 78 BPM | HEIGHT: 66 IN | SYSTOLIC BLOOD PRESSURE: 143 MMHG | DIASTOLIC BLOOD PRESSURE: 93 MMHG | RESPIRATION RATE: 18 BRPM | WEIGHT: 239 LBS | BODY MASS INDEX: 38.41 KG/M2 | TEMPERATURE: 97.2 F | OXYGEN SATURATION: 95 %

## 2024-11-14 DIAGNOSIS — Z95.828 PORT-A-CATH IN PLACE: Primary | ICD-10-CM

## 2024-11-14 DIAGNOSIS — C20 RECTAL CANCER: ICD-10-CM

## 2024-11-14 PROCEDURE — 25010000002 HEPARIN LOCK FLUSH PER 10 UNITS: Performed by: INTERNAL MEDICINE

## 2024-11-14 RX ORDER — HEPARIN SODIUM (PORCINE) LOCK FLUSH IV SOLN 100 UNIT/ML 100 UNIT/ML
500 SOLUTION INTRAVENOUS AS NEEDED
Status: DISCONTINUED | OUTPATIENT
Start: 2024-11-14 | End: 2024-11-14 | Stop reason: HOSPADM

## 2024-11-14 RX ORDER — SODIUM CHLORIDE 0.9 % (FLUSH) 0.9 %
10 SYRINGE (ML) INJECTION AS NEEDED
Status: DISCONTINUED | OUTPATIENT
Start: 2024-11-14 | End: 2024-11-14 | Stop reason: HOSPADM

## 2024-11-14 RX ORDER — HEPARIN SODIUM (PORCINE) LOCK FLUSH IV SOLN 100 UNIT/ML 100 UNIT/ML
500 SOLUTION INTRAVENOUS AS NEEDED
OUTPATIENT
Start: 2024-11-14

## 2024-11-14 RX ORDER — SODIUM CHLORIDE 0.9 % (FLUSH) 0.9 %
10 SYRINGE (ML) INJECTION AS NEEDED
OUTPATIENT
Start: 2024-11-14

## 2024-11-14 RX ADMIN — HEPARIN 500 UNITS: 100 SYRINGE at 11:59

## 2024-11-14 RX ADMIN — Medication 10 ML: at 11:58

## 2024-11-14 NOTE — PROGRESS NOTES
[facility] HEMATOLOGY & ONCOLOGY  [address]  [facility] [address] [phone]    Patient Name: Antwan Stevenson  Encounter Date: 12/03/2024  YOB: 1960  Patient Number: [id number]      REASON FOR FOLLOW-UP: Antwan Stevenson is a pleasant 64 y.o.  male who is seen on follow-up for rectal carcinoma.  He is seen C10D1 of neoadjuvant FOLFOX.    The patient is seen alone.  History is obtained from patient.  History is considered reliable.            Oncology/Hematology History Overview Note   DIAGNOSTIC ABNORMALITIES:   He presented with hematochezia.  Patient seen by Jose Guadalupe Diez, APRN 3/20/2024.  He reports frequent rectal bleeding.  Patient scheduled for colonoscopy.  CT abdomen pelvis 4/11/2024. Bilateral nephrolithiasis without obstructive uropathy.   Cholelithiasis.   Left pelvic spigelian hernia containing a portion of sigmoid colon.  No bowel obstruction or inflammation. Fatty supraumbilical and bilateral inguinal hernias present as well.  Evidence of prior umbilical hernia repair.  Bibasilar pulmonary micronodules measuring up to 0.4 cm.  Chest CT follow-up in 12 months recommended unless shorter interval follow-up is warranted by history.   Colonoscopy 4/17/2024.  Anal mass. The colon was normal from the rectum to the mid transverse, as I had to use an endoscope due to the severe anal stenosis.  No specimens collected.  The digital rectal exam revealed a firm, hard and obstructing anal mass. The mass was circumferential. Findings: The entire examined colon appeared normal  Pathology report 4/18/2024. 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  Phone note 4/18/2024.  Patient referred to oncology and colorectal surgery at [place].  CEA at 2.1 on 4/24/2024.  Pelvic MRI report on 4/25/2024. 4.5 cm circular solid lower rectum/anal canal neoplasm without evidence of mucinous  features. There is more inferior extent involving the anal canal at the 12:00/12:30 position and 6:00/6:30 position with involvement of the most inferior aspect of the right external anal sphincter. At the level of the anorectal junction there is also posterior extramural extent of tumor into the intersphincteric space abutting and likely involving the external anal sphincter. Two indeterminate right mesorectal lymph nodes.   Note from Dr. Kaylie Stewart 5/1/2024. Plan for total neoadjuvant chemo.  CT chest report 5/3/2024. No definite evidence of metastatic disease in the chest.  Indeterminate 3 mm and 5 mm right lower lobe pulmonary nodules. Recommend a follow-up chest CT in 3 to 6 months to evaluate for stability. Hepatic steatosis. Cholelithiasis. Bilateral nonobstructing renal calculi.           PREVIOUS INTERVENTIONS:   He is undergoing neoadjuvant FOLFOX 5/21/2024 through present.     Rectal cancer   4/24/2024 Initial Diagnosis    Rectal cancer     5/21/2024 -  Chemotherapy    OP COLON [id number] OXALIplatin / Leucovorin / Fluorouracil         PAST MEDICAL HISTORY:  ALLERGIES:  No Known Allergies  CURRENT MEDICATIONS:  Outpatient Encounter Medications as of 12/3/2024   Medication Sig Dispense Refill    acetaminophen (Tylenol) 325 MG tablet Take 3 tablets by mouth Every 8 (Eight) Hours. Take every 8 hours for 3 days then take prn as needed.      albuterol sulfate  (90 Base) MCG/ACT inhaler Inhale 2 puffs Every 6 (Six) Hours As Needed.      HYDROcodone-acetaminophen (NORCO) 5-325 MG per tablet Take 1 tablet by mouth Every 6 (Six) Hours As Needed for Mild Pain or Moderate Pain. (Patient taking differently: Take 0.5 tablets by mouth At Night As Needed for Mild Pain or Moderate Pain (rectal pain from cancer).) 100 tablet 0    ibuprofen (ADVIL,MOTRIN) 200 MG tablet Take 2 tablets by mouth Every 6 (Six) Hours As Needed for Mild Pain.      lidocaine-prilocaine (EMLA) 2.5-2.5 % cream 30 minutes prior to  treatment apply generous amount of Emla Cream to your port site and cover with clear plastic wrap until ready for use 30 g 2    loratadine (CLARITIN) 10 MG tablet Take 1 tablet by mouth Daily.      multivitamin (THERAGRAN) tablet tablet Take 1 tablet by mouth Daily.      ondansetron (Zofran) 4 MG tablet Take 1 tablet by mouth Every 8 (Eight) Hours As Needed for Nausea or Vomiting. 15 tablet 0    ondansetron (Zofran) 8 MG tablet Take 1 tablet by mouth Every 8 (Eight) Hours As Needed for Nausea or Vomiting. 60 tablet 2    predniSONE (DELTASONE) 5 MG tablet Take 1 tablet by mouth Daily.      prochlorperazine (COMPAZINE) 10 MG tablet Take 1 tablet by mouth Every 4 (Four) Hours As Needed for Nausea or Vomiting. 60 tablet 2    temazepam (RESTORIL) 7.5 MG capsule Take 1 capsule by mouth At Night As Needed for Sleep. HAS NOT STARTED THIS Rx      traMADol (ULTRAM) 50 MG tablet Take 1 tablet by mouth Every 6 (Six) Hours As Needed for Moderate Pain. 180 tablet 0     No facility-administered encounter medications on file as of 12/3/2024.     ADULT ILLNESSES:  Patient Active Problem List   Diagnosis Code    Rectal bleed K62.5    Rectal cancer C20    Port-A-Cath in place Z95.828     SURGERIES:  Past Surgical History:   Procedure Laterality Date    COLONOSCOPY N/A 4/17/2024    Procedure: COLONOSCOPY WITH ANESTHESIA;  Surgeon: Salty Arias DO;  Location: [facility] ENDOSCOPY;  Service: Gastroenterology;  Laterality: N/A;  preop; rectal bleed   postop questionable anal mass   PCP Duy Lomeli    HEMORROIDECTOMY      HERNIA REPAIR      VENOUS ACCESS DEVICE (PORT) INSERTION N/A 5/16/2024    Procedure: Single Lumen Port-a-cath insertion with flouroscopy;  Surgeon: Rosa Mccall MD;  Location: [facility] OR;  Service: General;  Laterality: N/A;     HEALTH MAINTENANCE ITEMS:  Health Maintenance Due   Topic Date Due    BMI FOLLOWUP  Never done    TDAP/TD VACCINES (1 - Tdap) Never done    ZOSTER VACCINE (1 of 2) Never done     "Pneumococcal Vaccine 65+ (1 of 1 - PCV) Never done    HEPATITIS C SCREENING  Never done    ANNUAL WELLNESS VISIT  Never done    INFLUENZA VACCINE  07/01/2024    COVID-19 Vaccine (4 - 2024-25 season) 09/01/2024       <no information>  Last Completed Colonoscopy            Discontinued - COLORECTAL CANCER SCREENING  Discontinued        Frequency changed to Never automatically (Topic No Longer Applies)    05/01/2024  Outside Procedure: COLONOSCOPY    04/17/2024  Colonoscopy    04/17/2024  Surgical Procedure: COLONOSCOPY                  Immunization History   Administered Date(s) Administered    COVID-19 (MODERNA) 1st,2nd,3rd Dose Monovalent 04/22/2021, 05/24/2021    COVID-19 (MODERNA) Monovalent Original Booster 01/04/2022     Last Completed Mammogram       This patient has no relevant Health Maintenance data.              FAMILY HISTORY:  Family History   Problem Relation Age of Onset    Colon cancer Neg Hx     Colon polyps Neg Hx     Esophageal cancer Neg Hx      SOCIAL HISTORY:  Social History     Socioeconomic History    Marital status:    Tobacco Use    Smoking status: Never    Smokeless tobacco: Never   Vaping Use    Vaping status: Never Used   Substance and Sexual Activity    Alcohol use: Never    Drug use: Never    Sexual activity: Defer       REVIEW OF SYSTEMS:    Review of Systems   Constitutional:  Negative for fatigue, fever and unexpected weight change.        \"I am fine.\"   HENT:  Negative for congestion and mouth sores.    Eyes:  Negative for discharge and redness.   Respiratory:  Negative for shortness of breath and wheezing.    Cardiovascular:  Negative for chest pain and leg swelling.   Gastrointestinal:  Positive for nausea. Negative for abdominal pain and vomiting.   Endocrine: Negative for cold intolerance and heat intolerance.   Genitourinary:  Negative for difficulty urinating and dysuria.   Musculoskeletal:  Negative for gait problem and myalgias.   Skin:  Negative for pallor. " "  Allergic/Immunologic: Negative for food allergies.   Neurological:  Negative for dizziness and speech difficulty.        \"Left index and middle finger getting worse. Tips of my toes.\"   Hematological:  Negative for adenopathy. Does not bruise/bleed easily.   Psychiatric/Behavioral:  Negative for agitation and confusion. The patient is not nervous/anxious.        VITAL SIGNS: /82   Pulse 79   Temp 97 °F (36.1 °C)   Resp 16   Ht 167.6 cm (66\")   Wt 107 kg (236 lb 6.4 oz)   SpO2 97%   BMI 38.16 kg/m²   Pain Score    12/03/24 0855   PainSc: 0-No pain       PHYSICAL EXAMINATION:     Physical Exam  Vitals reviewed.   Constitutional:       General: He is not in acute distress.  HENT:      Head: Normocephalic and atraumatic.   Eyes:      General: No scleral icterus.  Cardiovascular:      Rate and Rhythm: Normal rate.   Pulmonary:      Effort: No respiratory distress.      Breath sounds: No wheezing.      Comments: Port, right. No erythema.   Abdominal:      General: Bowel sounds are normal. There is no distension.      Palpations: Abdomen is soft.   Musculoskeletal:         General: No swelling.      Cervical back: Neck supple.   Skin:     Coloration: Skin is not pale.   Neurological:      Mental Status: He is alert and oriented to person, place, and time.   Psychiatric:         Mood and Affect: Mood normal.         Behavior: Behavior normal.         Thought Content: Thought content normal.         Judgment: Judgment normal.         LABS    Lab Results - Last 18 Months   Lab Units 11/12/24  0843 11/05/24  0838 10/29/24  0837 10/15/24  0845 10/08/24  0837 10/01/24  0817 07/30/24  0810 07/22/24  0740 04/24/24  1406 02/29/24  0859   HEMOGLOBIN g/dL 13.3 13.7 12.9* 13.2 12.4* 12.8*   < > 13.6   < > 14.2   HEMATOCRIT % 43.1 43.0 41.6 42.6 40.1 40.8   < > 44.1   < > 46.0   MCV fL 85.2 84.5 84.2 84.7 84.4 82.8   < > 84.2   < > 84.4   WBC 10*3/mm3 6.10 6.00 3.66 6.09 4.20 4.71   < > 9.70   < > 6.2   RDW % 15.4 15.9* " 15.7* 15.6* 15.5* 15.8*   < > 17.2*   < > 13.8   MPV fL 9.6 9.3 9.9 9.4 9.4 10.5   < > 10.6   < > 10.1   PLATELETS 10*3/mm3 174 192 94* 140 144 87*   < > 60*   < > 298   NEUTROS ABS 10*3/mm3 1.71 1.47* 1.26* 1.88 0.72* 1.33*   < > 7.15*   < > 3.5   LYMPHS ABS K/uL  --   --   --   --   --   --   --   --   --  1.6   MONOS ABS K/uL  --   --   --   --   --   --   --   --   --  1.00*   EOS ABS 10*3/mm3 0.00 0.00 0.00 0.00 0.00 0.00   < > 0.10   < > 0.06   BASOS ABS 10*3/mm3 0.00 0.00 0.00 0.00 0.04 0.00   < > 0.10   < > 0.10   IMMATURE GRANS (ABS) K/uL  --   --   --   --   --   --   --   --   --  0.0   NRBC /100 WBC  --   --   --   --   --   --   --  1.0*  --   --    NEUTROPHIL % % 28.0* 24.5* 34.3* 29.9* 16.2* 28.3*   < > 70.7   < >  --    MONOCYTES % % 22.0* 21.4* 14.1* 20.6* 19.2* 15.2*   < > 7.1   < >  --    BASOPHIL % % 0.0 0.0 0.0 0.0 1.0 0.0   < > 1.0   < >  --    ATYP LYMPH % % 17.0* 23.5* 13.1* 21.6* 24.2* 19.2*   < > 3.0   < >  --    ANISOCYTOSIS  Mod/2+ Mod/2+ Slight/1+ Slight/1+ Slight/1+ Slight/1+   < > Slight/1+  --   --     < > = values in this interval not displayed.       Lab Results - Last 18 Months   Lab Units 11/12/24  0843 11/05/24  0838 10/29/24  0837 10/15/24  0845 10/08/24  0837 10/01/24  0817   GLUCOSE mg/dL 105* 81 118* 84 129* 120*   SODIUM mmol/L 139 140 138 138 138 138   POTASSIUM mmol/L 4.3 4.2 3.7 4.0 3.9 4.0   CO2 mmol/L 28.0 28.0 24.0 28.0 25.0 24.0   CHLORIDE mmol/L 102 103 104 102 103 103   ANION GAP mmol/L 9.0 9.0 10.0 8.0 10.0 11.0   CREATININE mg/dL 0.83 0.94 0.94 0.87 0.86 0.93   BUN mg/dL 7* 9 9 10 6* 10   BUN / CREAT RATIO  8.4 9.6 9.6 11.5 7.0 10.8   CALCIUM mg/dL 9.5 9.8 9.4 9.5 9.2 9.4   ALK PHOS U/L 107 111 104 102 101 105   TOTAL PROTEIN g/dL 7.4 8.1 7.5 7.8 7.4 7.4   ALT (SGPT) U/L 21 21 21 16 15 16   AST (SGOT) U/L 29 35 31 25 26 29   BILIRUBIN mg/dL 0.4 0.3 0.5 0.4 0.3 0.5   ALBUMIN g/dL 3.9 4.2 4.0 3.9 3.8 3.9   GLOBULIN gm/dL 3.5 3.9 3.5 3.9 3.6 3.5       Lab Results -  "Last 18 Months   Lab Units 04/24/24  1406   CEA ng/mL 2.10       Lab Results - Last 18 Months   Lab Units 07/16/24  1459 06/18/24  0744 06/04/24  0722 02/29/24  0859   IRON mcg/dL 29*  --  97  --    TIBC mcg/dL 335  --  328  --    IRON SATURATION (TSAT) % 9*  --  30  --    FERRITIN ng/mL 1,735.00*  --  263.00  --    TSH uIU/mL  --   --   --  2.460   FOLATE ng/mL  --  >20.00  --   --        Antwan Stevenson reports a pain score of 0.       ASSESSMENT:  Adenocarcinoma the rectum. KKIE on 4/28/2024.  AJCC stage:IIIb (cT3, cN1b, cM0)  Treatment status: Patient is undergoing neoadjuvant FOLFOX from 5/21/2024 through present.  To be followed by CIV 5FU with radiation.   2.  Performance status of 0.  3.  Grade 2 neutropenia without fever from chemo.  Pegfilgrastim ordered as prophylaxis.  Patient declined.  4.  Normocytic anemia secondary to hemotherapy.  5.  Grade 1 neuropathy, on observation.     6.  Elevated liver function test.  CT abdomen 8/2/2024 showed no liver metastasis.  7.  Neutrophilia from pegfilgrastim.  8.  Bibasilar pulmonary micronodules measuring up to 0.4 cm on 4/11/2024.  On observation.  9.  Thrombocytopenia from chemo.  On observation.  10. ?Interstitial granulomatous dermatitis with arthritis.  He is taking prednisone 5 mg daily.           PLAN:   Re: Tolerance to neoadjuvant FOLFOX.  \"Nausea and had dark urine. Got dehydrated.\"  2.   Re:  Heme status.  Latest WBC 6.1, ANC 1.71, hemoglobin 13.3, hematocrit 43.1, MCV 85.2 and platelet 174. No dose reduction of oxaliplatin at this point.   3.   Re:  CMP. GFR latest 96.5, AST latest 29 from 35 from 31 from 25 from 26 and alkaline phosphatase latest 107 from 111 from 104 from 102 from 101.  4.   Re:  Magnesium latest 2.1.  5.   Re:  Grade 1 neuropathy on observation.   6.   Re: Intravenous fluid administration as needed on D3 secondary to nausea and worsening fatigue.  7.  Continue total neoadjuvant FOLFOX if ANC at " "least 1.5.  Then, the patient will undergo neoadjuvant CIV 5-FU with radiation.   8.  Plan for 12 cycles of neoadjuvant FOLFOX:  9.  Monitor for potential hypersensitivity reactions, anaphylaxis, worsening neuropathy, myelosuppression, nausea, vomiting, diarrhea, cold intolerance or alopecia.  10.  Schedule treatment C10D1 to 3 on 12/3/2024 and C11 D1 to D3 on 1/7/2025.\"I will be out of town and it's [place].\"   To be administered every 2 weeks:   Oxaliplatin 85 mg/m2 IVPB.  Leucovorin 400 mg/m2.  5- mg/ m2  IVP.  Begin 5-FU 2,400 mg/m2 IVPB over 46 hours.    11.    Premedicate with:  Aloxi 0.25 mg IVP.  Fosaprepitant 150 mg IV.  Decadron 12 mg IVPB over 20-30 min.  12.  He declined peg filgrastim D3 as primary prophylaxis. \"It hurts my liver.\" Cycle 3 on 6/18/2024 delayed due to ANC of 1.2.  Monitor for arthralgias. C7 delayed 9/10/2024 due to ANC of 1.3.  C8 delayed due to ANC of 0.72 andC9 delayed secondary to ANC of 1.2 and 1.4.  13.   Blood for weekly CBC with differential, magnesium, and CMP with FOLFOX.  14.  eRx ondansetron 8 mg p.o. every 8 hours as needed for nausea and vomiting #60, 2 refills if needed.  15.  eRx prochlorperazine 10 mg p.o. every 4 hours as needed for nausea and vomiting #60, 2 refills if needed.  16.  eRx tramadol 50 mg p.o. every 6 hours as needed for pain, #180, 0 refill if needed.  17.  Continue care per primary care physician and the other specialists.  18.  Plan of care discussed with patient.  Understanding expressed.  He is agreeable to proceed.  19.  Advance Care Planning  ACP discussion was declined by the patient. Patient does not have an advance directive, information provided.    20. Return to office 5 weeks. Plan to repeat MRI pelvis and CT chest after neoadjuvant chemo.                I have reviewed the assessment and plan and verified the accuracy of it. No changes to assessment and plan since the information was documented. Raul Mayberry MD 12/03/24         I " spent 32 total minutes, face-to-face, caring for Antwan today. Greater than 50% of this time involved counseling and/or coordination of care as documented within this note.                 (Salty Arias DO)  (Kaylie Stewart MD)   (Marty Benitez MD radiation oncology [place] [facility].  (Bruno Sanford MD)  (Duy Lomeli MD)

## 2024-11-19 DIAGNOSIS — Z95.828 PORT-A-CATH IN PLACE: ICD-10-CM

## 2024-11-19 DIAGNOSIS — C20 RECTAL CANCER: Primary | ICD-10-CM

## 2024-12-02 RX ORDER — ONDANSETRON 8 MG/1
8 TABLET, FILM COATED ORAL EVERY 8 HOURS PRN
Qty: 60 TABLET | Refills: 2 | Status: CANCELLED | OUTPATIENT
Start: 2024-12-02

## 2024-12-02 RX ORDER — PROCHLORPERAZINE MALEATE 10 MG
10 TABLET ORAL EVERY 6 HOURS PRN
Qty: 60 TABLET | Refills: 2 | Status: CANCELLED | OUTPATIENT
Start: 2024-12-02

## 2024-12-02 RX ORDER — TRAMADOL HYDROCHLORIDE 50 MG/1
50 TABLET ORAL EVERY 6 HOURS PRN
Qty: 180 TABLET | Refills: 0 | Status: CANCELLED | OUTPATIENT
Start: 2024-12-02

## 2024-12-03 ENCOUNTER — LAB (OUTPATIENT)
Dept: LAB | Facility: HOSPITAL | Age: 64
End: 2024-12-03
Payer: MEDICARE

## 2024-12-03 ENCOUNTER — OFFICE VISIT (OUTPATIENT)
Dept: ONCOLOGY | Facility: CLINIC | Age: 64
End: 2024-12-03
Payer: MEDICARE

## 2024-12-03 ENCOUNTER — INFUSION (OUTPATIENT)
Dept: ONCOLOGY | Facility: HOSPITAL | Age: 64
End: 2024-12-03
Payer: MEDICARE

## 2024-12-03 ENCOUNTER — TELEPHONE (OUTPATIENT)
Dept: ONCOLOGY | Facility: CLINIC | Age: 64
End: 2024-12-03

## 2024-12-03 VITALS
OXYGEN SATURATION: 97 % | BODY MASS INDEX: 37.99 KG/M2 | WEIGHT: 236.4 LBS | RESPIRATION RATE: 16 BRPM | HEIGHT: 66 IN | TEMPERATURE: 97 F | SYSTOLIC BLOOD PRESSURE: 126 MMHG | DIASTOLIC BLOOD PRESSURE: 82 MMHG | HEART RATE: 79 BPM

## 2024-12-03 VITALS
WEIGHT: 236 LBS | RESPIRATION RATE: 18 BRPM | OXYGEN SATURATION: 97 % | SYSTOLIC BLOOD PRESSURE: 144 MMHG | BODY MASS INDEX: 37.93 KG/M2 | HEIGHT: 66 IN | TEMPERATURE: 97 F | HEART RATE: 80 BPM | DIASTOLIC BLOOD PRESSURE: 80 MMHG

## 2024-12-03 DIAGNOSIS — Z95.828 PORT-A-CATH IN PLACE: ICD-10-CM

## 2024-12-03 DIAGNOSIS — T45.1X5A ANEMIA DUE TO ANTINEOPLASTIC CHEMOTHERAPY: ICD-10-CM

## 2024-12-03 DIAGNOSIS — D64.81 ANEMIA DUE TO ANTINEOPLASTIC CHEMOTHERAPY: ICD-10-CM

## 2024-12-03 DIAGNOSIS — C20 RECTAL CANCER: Primary | ICD-10-CM

## 2024-12-03 LAB
ALBUMIN SERPL-MCNC: 3.9 G/DL (ref 3.5–5.2)
ALBUMIN/GLOB SERPL: 1.1 G/DL
ALP SERPL-CCNC: 105 U/L (ref 39–117)
ALT SERPL W P-5'-P-CCNC: 19 U/L (ref 1–41)
ANION GAP SERPL CALCULATED.3IONS-SCNC: 11 MMOL/L (ref 5–15)
ANISOCYTOSIS BLD QL: ABNORMAL
AST SERPL-CCNC: 32 U/L (ref 1–40)
BASOPHILS # BLD MANUAL: 0.06 10*3/MM3 (ref 0–0.2)
BASOPHILS NFR BLD MANUAL: 1 % (ref 0–1.5)
BILIRUB SERPL-MCNC: 0.4 MG/DL (ref 0–1.2)
BUN SERPL-MCNC: 7 MG/DL (ref 8–23)
BUN/CREAT SERPL: 7.1 (ref 7–25)
CALCIUM SPEC-SCNC: 9.4 MG/DL (ref 8.6–10.5)
CHLORIDE SERPL-SCNC: 102 MMOL/L (ref 98–107)
CO2 SERPL-SCNC: 26 MMOL/L (ref 22–29)
CREAT SERPL-MCNC: 0.99 MG/DL (ref 0.76–1.27)
DEPRECATED RDW RBC AUTO: 48.6 FL (ref 37–54)
EGFRCR SERPLBLD CKD-EPI 2021: 84 ML/MIN/1.73
EOSINOPHIL # BLD MANUAL: 0 10*3/MM3 (ref 0–0.4)
EOSINOPHIL NFR BLD MANUAL: 0 % (ref 0.3–6.2)
ERYTHROCYTE [DISTWIDTH] IN BLOOD BY AUTOMATED COUNT: 15.8 % (ref 12.3–15.4)
GLOBULIN UR ELPH-MCNC: 3.6 GM/DL
GLUCOSE SERPL-MCNC: 91 MG/DL (ref 65–99)
HCT VFR BLD AUTO: 41.4 % (ref 37.5–51)
HGB BLD-MCNC: 12.9 G/DL (ref 13–17.7)
HOLD SPECIMEN: NORMAL
LYMPHOCYTES # BLD MANUAL: 3.26 10*3/MM3 (ref 0.7–3.1)
LYMPHOCYTES NFR BLD MANUAL: 19 % (ref 5–12)
MAGNESIUM SERPL-MCNC: 2.3 MG/DL (ref 1.6–2.4)
MCH RBC QN AUTO: 26.5 PG (ref 26.6–33)
MCHC RBC AUTO-ENTMCNC: 31.2 G/DL (ref 31.5–35.7)
MCV RBC AUTO: 85 FL (ref 79–97)
MICROCYTES BLD QL: ABNORMAL
MONOCYTES # BLD: 1.11 10*3/MM3 (ref 0.1–0.9)
NEUTROPHILS # BLD AUTO: 1.4 10*3/MM3 (ref 1.7–7)
NEUTROPHILS NFR BLD MANUAL: 24 % (ref 42.7–76)
PLAT MORPH BLD: NORMAL
PLATELET # BLD AUTO: 198 10*3/MM3 (ref 140–450)
PMV BLD AUTO: 9.5 FL (ref 6–12)
POLYCHROMASIA BLD QL SMEAR: ABNORMAL
POTASSIUM SERPL-SCNC: 4.4 MMOL/L (ref 3.5–5.2)
PROT SERPL-MCNC: 7.5 G/DL (ref 6–8.5)
RBC # BLD AUTO: 4.87 10*6/MM3 (ref 4.14–5.8)
SODIUM SERPL-SCNC: 139 MMOL/L (ref 136–145)
SPHEROCYTES BLD QL SMEAR: ABNORMAL
VARIANT LYMPHS NFR BLD MANUAL: 25 % (ref 0–5)
VARIANT LYMPHS NFR BLD MANUAL: 31 % (ref 19.6–45.3)
WBC MORPH BLD: NORMAL
WBC NRBC COR # BLD AUTO: 5.83 10*3/MM3 (ref 3.4–10.8)

## 2024-12-03 PROCEDURE — 85007 BL SMEAR W/DIFF WBC COUNT: CPT

## 2024-12-03 PROCEDURE — G0463 HOSPITAL OUTPT CLINIC VISIT: HCPCS

## 2024-12-03 PROCEDURE — 36415 COLL VENOUS BLD VENIPUNCTURE: CPT

## 2024-12-03 PROCEDURE — 83735 ASSAY OF MAGNESIUM: CPT

## 2024-12-03 PROCEDURE — 80053 COMPREHEN METABOLIC PANEL: CPT

## 2024-12-03 PROCEDURE — 85025 COMPLETE CBC W/AUTO DIFF WBC: CPT

## 2024-12-03 RX ORDER — APREPITANT 80 MG/1
80 CAPSULE ORAL DAILY
Status: CANCELLED
Start: 2024-12-17

## 2024-12-03 NOTE — PROGRESS NOTES
Per Dr. Mayberry ANC 1.40, hold chemo. Patient did not want to reschedule for next week and will return 12/17. Notified office.

## 2024-12-05 ENCOUNTER — TELEPHONE (OUTPATIENT)
Dept: ONCOLOGY | Facility: CLINIC | Age: 64
End: 2024-12-05
Payer: MEDICARE

## 2024-12-11 DIAGNOSIS — C20 RECTAL CANCER: Primary | ICD-10-CM

## 2024-12-11 DIAGNOSIS — Z95.828 PORT-A-CATH IN PLACE: ICD-10-CM

## 2024-12-11 RX ORDER — PALONOSETRON 0.05 MG/ML
0.25 INJECTION, SOLUTION INTRAVENOUS ONCE
OUTPATIENT
Start: 2024-12-31

## 2024-12-11 RX ORDER — FLUOROURACIL 50 MG/ML
400 INJECTION, SOLUTION INTRAVENOUS ONCE
OUTPATIENT
Start: 2024-12-31

## 2024-12-11 RX ORDER — DIPHENHYDRAMINE HYDROCHLORIDE 50 MG/ML
50 INJECTION INTRAMUSCULAR; INTRAVENOUS AS NEEDED
OUTPATIENT
Start: 2024-12-31

## 2024-12-11 RX ORDER — APREPITANT 80 MG/1
80 CAPSULE ORAL DAILY
Start: 2024-12-31

## 2024-12-11 RX ORDER — FLUOROURACIL 50 MG/ML
400 INJECTION, SOLUTION INTRAVENOUS ONCE
OUTPATIENT
Start: 2024-12-17

## 2024-12-11 RX ORDER — DIPHENHYDRAMINE HYDROCHLORIDE 50 MG/ML
50 INJECTION INTRAMUSCULAR; INTRAVENOUS AS NEEDED
OUTPATIENT
Start: 2024-12-17

## 2024-12-11 RX ORDER — DEXTROSE MONOHYDRATE 50 MG/ML
20 INJECTION, SOLUTION INTRAVENOUS ONCE
OUTPATIENT
Start: 2024-12-31

## 2024-12-11 RX ORDER — HYDROCORTISONE SODIUM SUCCINATE 100 MG/2ML
100 INJECTION INTRAMUSCULAR; INTRAVENOUS AS NEEDED
OUTPATIENT
Start: 2024-12-17

## 2024-12-11 RX ORDER — DEXAMETHASONE SODIUM PHOSPHATE 10 MG/ML
10 INJECTION INTRAMUSCULAR; INTRAVENOUS ONCE
Start: 2024-12-17 | End: 2024-12-17

## 2024-12-11 RX ORDER — FAMOTIDINE 10 MG/ML
20 INJECTION, SOLUTION INTRAVENOUS AS NEEDED
OUTPATIENT
Start: 2024-12-17

## 2024-12-11 RX ORDER — PALONOSETRON 0.05 MG/ML
0.25 INJECTION, SOLUTION INTRAVENOUS ONCE
OUTPATIENT
Start: 2024-12-17

## 2024-12-11 RX ORDER — APREPITANT 80 MG/1
80 CAPSULE ORAL DAILY
Start: 2024-12-17

## 2024-12-11 RX ORDER — DEXAMETHASONE SODIUM PHOSPHATE 10 MG/ML
10 INJECTION INTRAMUSCULAR; INTRAVENOUS ONCE
Start: 2024-12-31 | End: 2024-12-31

## 2024-12-11 RX ORDER — HYDROCORTISONE SODIUM SUCCINATE 100 MG/2ML
100 INJECTION INTRAMUSCULAR; INTRAVENOUS AS NEEDED
OUTPATIENT
Start: 2024-12-31

## 2024-12-11 RX ORDER — FAMOTIDINE 10 MG/ML
20 INJECTION, SOLUTION INTRAVENOUS AS NEEDED
OUTPATIENT
Start: 2024-12-31

## 2024-12-11 RX ORDER — DEXTROSE MONOHYDRATE 50 MG/ML
20 INJECTION, SOLUTION INTRAVENOUS ONCE
OUTPATIENT
Start: 2024-12-17

## 2024-12-17 ENCOUNTER — LAB (OUTPATIENT)
Dept: LAB | Facility: HOSPITAL | Age: 64
End: 2024-12-17
Payer: MEDICARE

## 2024-12-17 ENCOUNTER — INFUSION (OUTPATIENT)
Dept: ONCOLOGY | Facility: HOSPITAL | Age: 64
End: 2024-12-17
Payer: MEDICARE

## 2024-12-17 VITALS
RESPIRATION RATE: 16 BRPM | TEMPERATURE: 97.7 F | OXYGEN SATURATION: 96 % | WEIGHT: 238.2 LBS | HEART RATE: 79 BPM | HEIGHT: 66 IN | SYSTOLIC BLOOD PRESSURE: 143 MMHG | DIASTOLIC BLOOD PRESSURE: 78 MMHG | BODY MASS INDEX: 38.28 KG/M2

## 2024-12-17 DIAGNOSIS — Z95.828 PORT-A-CATH IN PLACE: ICD-10-CM

## 2024-12-17 DIAGNOSIS — D64.81 ANEMIA DUE TO ANTINEOPLASTIC CHEMOTHERAPY: ICD-10-CM

## 2024-12-17 DIAGNOSIS — T45.1X5A ANEMIA DUE TO ANTINEOPLASTIC CHEMOTHERAPY: ICD-10-CM

## 2024-12-17 DIAGNOSIS — C20 RECTAL CANCER: Primary | ICD-10-CM

## 2024-12-17 DIAGNOSIS — C20 RECTAL CANCER: ICD-10-CM

## 2024-12-17 LAB
ALBUMIN SERPL-MCNC: 4 G/DL (ref 3.5–5.2)
ALBUMIN/GLOB SERPL: 1.2 G/DL
ALP SERPL-CCNC: 100 U/L (ref 39–117)
ALT SERPL W P-5'-P-CCNC: 24 U/L (ref 1–41)
ANION GAP SERPL CALCULATED.3IONS-SCNC: 12 MMOL/L (ref 5–15)
AST SERPL-CCNC: 33 U/L (ref 1–40)
BASOPHILS # BLD MANUAL: 0 10*3/MM3 (ref 0–0.2)
BASOPHILS NFR BLD MANUAL: 0 % (ref 0–1.5)
BILIRUB SERPL-MCNC: 0.4 MG/DL (ref 0–1.2)
BUN SERPL-MCNC: 10 MG/DL (ref 8–23)
BUN/CREAT SERPL: 11.8 (ref 7–25)
CALCIUM SPEC-SCNC: 9.4 MG/DL (ref 8.6–10.5)
CHLORIDE SERPL-SCNC: 103 MMOL/L (ref 98–107)
CO2 SERPL-SCNC: 24 MMOL/L (ref 22–29)
CREAT SERPL-MCNC: 0.85 MG/DL (ref 0.76–1.27)
DEPRECATED RDW RBC AUTO: 46.7 FL (ref 37–54)
EGFRCR SERPLBLD CKD-EPI 2021: 95.8 ML/MIN/1.73
EOSINOPHIL # BLD MANUAL: 0 10*3/MM3 (ref 0–0.4)
EOSINOPHIL NFR BLD MANUAL: 0 % (ref 0.3–6.2)
ERYTHROCYTE [DISTWIDTH] IN BLOOD BY AUTOMATED COUNT: 15.5 % (ref 12.3–15.4)
GLOBULIN UR ELPH-MCNC: 3.4 GM/DL
GLUCOSE SERPL-MCNC: 103 MG/DL (ref 65–99)
HCT VFR BLD AUTO: 41.6 % (ref 37.5–51)
HGB BLD-MCNC: 13.3 G/DL (ref 13–17.7)
LYMPHOCYTES # BLD MANUAL: 2.9 10*3/MM3 (ref 0.7–3.1)
LYMPHOCYTES NFR BLD MANUAL: 17 % (ref 5–12)
MAGNESIUM SERPL-MCNC: 2.2 MG/DL (ref 1.6–2.4)
MCH RBC QN AUTO: 26.8 PG (ref 26.6–33)
MCHC RBC AUTO-ENTMCNC: 32 G/DL (ref 31.5–35.7)
MCV RBC AUTO: 83.9 FL (ref 79–97)
MICROCYTES BLD QL: ABNORMAL
MONOCYTES # BLD: 1.17 10*3/MM3 (ref 0.1–0.9)
NEUTROPHILS # BLD AUTO: 2.83 10*3/MM3 (ref 1.7–7)
NEUTROPHILS NFR BLD MANUAL: 39 % (ref 42.7–76)
NEUTS BAND NFR BLD MANUAL: 2 % (ref 0–5)
PLAT MORPH BLD: NORMAL
PLATELET # BLD AUTO: 217 10*3/MM3 (ref 140–450)
PMV BLD AUTO: 9.5 FL (ref 6–12)
POTASSIUM SERPL-SCNC: 4 MMOL/L (ref 3.5–5.2)
PROT SERPL-MCNC: 7.4 G/DL (ref 6–8.5)
RBC # BLD AUTO: 4.96 10*6/MM3 (ref 4.14–5.8)
SODIUM SERPL-SCNC: 139 MMOL/L (ref 136–145)
VARIANT LYMPHS NFR BLD MANUAL: 19 % (ref 0–5)
VARIANT LYMPHS NFR BLD MANUAL: 23 % (ref 19.6–45.3)
WBC MORPH BLD: NORMAL
WBC NRBC COR # BLD AUTO: 6.9 10*3/MM3 (ref 3.4–10.8)

## 2024-12-17 PROCEDURE — 36415 COLL VENOUS BLD VENIPUNCTURE: CPT

## 2024-12-17 PROCEDURE — 85025 COMPLETE CBC W/AUTO DIFF WBC: CPT

## 2024-12-17 PROCEDURE — 80053 COMPREHEN METABOLIC PANEL: CPT

## 2024-12-17 PROCEDURE — G0498 CHEMO EXTEND IV INFUS W/PUMP: HCPCS

## 2024-12-17 PROCEDURE — 25010000002 FLUOROURACIL PER 500 MG: Performed by: INTERNAL MEDICINE

## 2024-12-17 PROCEDURE — 96411 CHEMO IV PUSH ADDL DRUG: CPT

## 2024-12-17 PROCEDURE — 63710000001 APREPITANT PER 5 MG: Performed by: INTERNAL MEDICINE

## 2024-12-17 PROCEDURE — 96415 CHEMO IV INFUSION ADDL HR: CPT

## 2024-12-17 PROCEDURE — 25010000002 LEUCOVORIN CALCIUM PER 50 MG: Performed by: INTERNAL MEDICINE

## 2024-12-17 PROCEDURE — 25010000002 DEXAMETHASONE PER 1 MG: Performed by: INTERNAL MEDICINE

## 2024-12-17 PROCEDURE — 96413 CHEMO IV INFUSION 1 HR: CPT

## 2024-12-17 PROCEDURE — 96368 THER/DIAG CONCURRENT INF: CPT

## 2024-12-17 PROCEDURE — 96375 TX/PRO/DX INJ NEW DRUG ADDON: CPT

## 2024-12-17 PROCEDURE — 25010000002 OXALIPLATIN PER 0.5 MG: Performed by: INTERNAL MEDICINE

## 2024-12-17 PROCEDURE — 96366 THER/PROPH/DIAG IV INF ADDON: CPT

## 2024-12-17 PROCEDURE — 25010000003 DEXTROSE 5 % SOLUTION 250 ML FLEX CONT: Performed by: INTERNAL MEDICINE

## 2024-12-17 PROCEDURE — 25010000002 LEUCOVORIN 200 MG RECONSTITUTED SOLUTION 200 MG VIAL: Performed by: INTERNAL MEDICINE

## 2024-12-17 PROCEDURE — 25010000002 PALONOSETRON PER 25 MCG: Performed by: INTERNAL MEDICINE

## 2024-12-17 PROCEDURE — 83735 ASSAY OF MAGNESIUM: CPT

## 2024-12-17 PROCEDURE — 25010000003 DEXTROSE 5 % SOLUTION: Performed by: INTERNAL MEDICINE

## 2024-12-17 PROCEDURE — 85007 BL SMEAR W/DIFF WBC COUNT: CPT

## 2024-12-17 RX ORDER — DEXTROSE MONOHYDRATE 50 MG/ML
20 INJECTION, SOLUTION INTRAVENOUS ONCE
Status: COMPLETED | OUTPATIENT
Start: 2024-12-17 | End: 2024-12-17

## 2024-12-17 RX ORDER — APREPITANT 40 MG/1
80 CAPSULE ORAL DAILY
Status: DISCONTINUED | OUTPATIENT
Start: 2024-12-17 | End: 2024-12-17 | Stop reason: HOSPADM

## 2024-12-17 RX ORDER — FLUOROURACIL 50 MG/ML
400 INJECTION, SOLUTION INTRAVENOUS ONCE
Status: COMPLETED | OUTPATIENT
Start: 2024-12-17 | End: 2024-12-17

## 2024-12-17 RX ORDER — DEXAMETHASONE SODIUM PHOSPHATE 10 MG/ML
10 INJECTION INTRAMUSCULAR; INTRAVENOUS ONCE
Status: COMPLETED | OUTPATIENT
Start: 2024-12-17 | End: 2024-12-17

## 2024-12-17 RX ORDER — PALONOSETRON 0.05 MG/ML
0.25 INJECTION, SOLUTION INTRAVENOUS ONCE
Status: COMPLETED | OUTPATIENT
Start: 2024-12-17 | End: 2024-12-17

## 2024-12-17 RX ADMIN — LEUCOVORIN CALCIUM 860 MG: 350 INJECTION, POWDER, LYOPHILIZED, FOR SOLUTION INTRAMUSCULAR; INTRAVENOUS at 10:31

## 2024-12-17 RX ADMIN — PALONOSETRON HYDROCHLORIDE 0.25 MG: 0.25 INJECTION, SOLUTION INTRAVENOUS at 09:38

## 2024-12-17 RX ADMIN — DEXTROSE MONOHYDRATE 20 ML/HR: 50 INJECTION, SOLUTION INTRAVENOUS at 10:28

## 2024-12-17 RX ADMIN — OXALIPLATIN 185 MG: 5 INJECTION, SOLUTION INTRAVENOUS at 10:29

## 2024-12-17 RX ADMIN — APREPITANT 80 MG: 40 CAPSULE ORAL at 09:34

## 2024-12-17 RX ADMIN — FLUOROURACIL 5160 MG: 50 INJECTION, SOLUTION INTRAVENOUS at 13:05

## 2024-12-17 RX ADMIN — DEXAMETHASONE SODIUM PHOSPHATE 10 MG: 10 INJECTION INTRAMUSCULAR; INTRAVENOUS at 09:36

## 2024-12-17 RX ADMIN — FLUOROURACIL 860 MG: 50 INJECTION, SOLUTION INTRAVENOUS at 12:56

## 2024-12-19 ENCOUNTER — INFUSION (OUTPATIENT)
Dept: ONCOLOGY | Facility: HOSPITAL | Age: 64
End: 2024-12-19
Payer: MEDICARE

## 2024-12-19 VITALS
WEIGHT: 238 LBS | SYSTOLIC BLOOD PRESSURE: 128 MMHG | BODY MASS INDEX: 38.25 KG/M2 | DIASTOLIC BLOOD PRESSURE: 59 MMHG | HEIGHT: 66 IN | HEART RATE: 69 BPM | RESPIRATION RATE: 18 BRPM | TEMPERATURE: 97.4 F | OXYGEN SATURATION: 94 %

## 2024-12-19 DIAGNOSIS — Z95.828 PORT-A-CATH IN PLACE: ICD-10-CM

## 2024-12-19 DIAGNOSIS — C20 RECTAL CANCER: Primary | ICD-10-CM

## 2024-12-19 PROCEDURE — 25010000002 HEPARIN LOCK FLUSH PER 10 UNITS: Performed by: INTERNAL MEDICINE

## 2024-12-19 RX ORDER — HEPARIN SODIUM (PORCINE) LOCK FLUSH IV SOLN 100 UNIT/ML 100 UNIT/ML
500 SOLUTION INTRAVENOUS AS NEEDED
OUTPATIENT
Start: 2024-12-19

## 2024-12-19 RX ORDER — HEPARIN SODIUM (PORCINE) LOCK FLUSH IV SOLN 100 UNIT/ML 100 UNIT/ML
500 SOLUTION INTRAVENOUS AS NEEDED
Status: DISCONTINUED | OUTPATIENT
Start: 2024-12-19 | End: 2024-12-19 | Stop reason: HOSPADM

## 2024-12-19 RX ORDER — SODIUM CHLORIDE 0.9 % (FLUSH) 0.9 %
10 SYRINGE (ML) INJECTION AS NEEDED
Status: DISCONTINUED | OUTPATIENT
Start: 2024-12-19 | End: 2024-12-19 | Stop reason: HOSPADM

## 2024-12-19 RX ORDER — SODIUM CHLORIDE 0.9 % (FLUSH) 0.9 %
10 SYRINGE (ML) INJECTION AS NEEDED
OUTPATIENT
Start: 2024-12-19

## 2024-12-19 RX ADMIN — HEPARIN 500 UNITS: 100 SYRINGE at 11:57

## 2024-12-19 RX ADMIN — Medication 10 ML: at 11:57

## 2024-12-19 NOTE — PROGRESS NOTES
"[facility]   Physical Therapy Treatment Note     Name: Lorraine Brambila  Clinic Number: [id number]    Therapy Diagnosis:   Encounter Diagnosis   Name Primary?    Acute pain of left shoulder Yes       Physician: Abdirahman Gill MD    Visit Date: 1/25/2024    Physician Orders: PT Eval and Treat   Medical Diagnosis from Referral: displaced fracture of L humerus  Evaluation Date: 12/28/2023  Authorization Period Expiration: 11/26/2024  Plan of Care Expiration: 2/23/2024  Progress Note Due:2/23/2024  Date of Surgery: NA   Visit # / Visits authorized: 10/24  FOTO: to be completed on visit 12     Precautions: Standard and Fall      Time In: 11:01  Time Out: 11:34  Total Billable Time: 33 minutes     PTA Visit #: 0/5  SUBJECTIVE   Pt reports:  "I feel like my motion is getting better. I have been using it at home to do housework."    She was compliant with home exercise program.  Response to previous treatment: N/A  Functional change: increased use of L UE for ADLs    Pain: 3/10  Location: left shoulder      OBJECTIVE   Objective Measures updated at progress report unless specified.   Treatment   LORRAINE received the treatments listed below:      therapeutic exercises to develop strength, endurance, ROM, flexibility, and posture for 35 minutes including: see flowsheet below      Therapeutic-Exercise  Reps          Pulleys (flexion and ABD) 4 mins each    Finger Ladder 5 x 5" flexion    Scapular retractions  30 x 3"    Ball Rolls flexion and ABD  10 x 10" each    Table Slides (flexion and scaption)  10 x 10" Each    Wand flexion stretch  10 x 10"    Wand ER stretch  5 x 10"          Isometric flexion   10 x 3"    Isometric ER  10 x 3"    Isometric IR  10 x 3"    Isometric extension   10 x 3"     Hand Gripper   30 x 3" Green        Patient Education and Home Exercises     Home Exercises Provided and Patient Education Provided     Education provided:   - Home exercise program, Plan of Care, " Pt declined neulasta pod    Delayed onset muscle soreness     Written Home Exercises Provided: Patient instructed to cont prior HEP. Exercises were reviewed and LORRAINE was able to demonstrate them prior to the end of the session.  LORRAINE demonstrated good  understanding of the education provided. See EMR under Patient Instructions for exercises provided during therapy sessions    ASSESSMENT   Lorraine is a 67 y.o. female referred to outpatient Physical Therapy with a medical diagnosis of displaced L humerus fracture. Patient presents with L shoulder pain, decreased L shoulder ROM, and decreased L UE muscle strength and motor control. Patient's impairments are currently limiting her  functional use of L UE to complete ADLs. PT provided visual and tactile cueing throughout warm-up and treatment for proper form, posture, decreased compensations, and movement through increased ROM. Patient also required verbal and tactile cues for decreased guarding during manual stretching and passive range of motion. Patient had no reports of pain during treatment session. PT completed patient goal assessment for last scheduled visit. PT and patient agree to recert to continue therapy 3  times a week for 4 weeks to continue progressing toward rehab goals.       Patient prognosis is Good.   Patient will benefit from skilled outpatient Physical Therapy to address the deficits stated above and in the chart below, provide patient /family education, and to maximize patientt's level of independence.      Plan of care discussed with patient: Yes  Patient's spiritual, cultural and educational needs considered and patient is agreeable to the plan of care and goals as stated below:      Anticipated Barriers for therapy: compliance with Home exercise program, guarding  Goals:  Short Term Goals: 2 weeks   Patient will independently complete Home exercise program with correct form.   Patient will report decreased L shoulder pain at rest to less than or equal to 2/10 for improved  quality of life.      Long Term Goals: 4 weeks   Pt will increase L shoulder flexion to 120 degrees, external rotation to C4, and internal rotation to L2 for independent dressing  Pt will increase L upper extremity to 4/5 to allow patient to perform activities of daily living independently  Pt will report decrease in L shoulder pain to 3/10 at worst to improve quality of life  Patient will have increased FOTO score to greater than or equal to 55% indicating increased function.    PLAN   Continue POC per PT orders to progress patient toward rehab goals.   Recert 3 x 4 weeks to begin week of 1/29/2024.   Mia Wheatley, PT, DPT     1/25/2024

## 2025-01-06 ENCOUNTER — TELEPHONE (OUTPATIENT)
Dept: ONCOLOGY | Facility: CLINIC | Age: 65
End: 2025-01-06
Payer: MEDICARE

## 2025-01-06 NOTE — TELEPHONE ENCOUNTER
Received call from patient Antwan Stevenson, he calls to report that he has not been feeling well the past few days and feels he may of picked up cold type issues that his 2 grand daughters were experiencing last week, with c/o sinus congestion, overall body aches, unsure if he has been running a temp, he has not checked.     1/7/25 apt was cancelled and moved to 1/14/25 @ 8:45 am lab, f/u with Dr Mayberry and yamel to follow, patient v/u of times and dates.   Notified Amarilis, she was able to add txt on to 1/14/25 to follow

## 2025-01-07 NOTE — PROGRESS NOTES
[facility] ON[facility] HEMATOLOGY & ONCOLOGY  [address]  [place] [address] [phone]    Patient Name: Antwan Stevenson  Encounter Date: 01/14/2025  YOB: 1960  Patient Number: [id number]      REASON FOR FOLLOW-UP: Antwan Stevenson is a pleasant 64 y.o.  male who is seen on follow-up for stage IIIb adenocarcinoma the rectum.  He is seen C11D1 of neoadjuvant FOLFOX.    The patient is seen alone.  History is obtained from patient.  Patient is a reliable historian.           Oncology/Hematology History Overview Note   DIAGNOSTIC ABNORMALITIES:   He presented with hematochezia.  Patient seen by Jose Guadalupe Diez, APRN 3/20/2024.  He reports frequent rectal bleeding.  Patient scheduled for colonoscopy.  CT abdomen pelvis 4/11/2024. Bilateral nephrolithiasis without obstructive uropathy.   Cholelithiasis.   Left pelvic spigelian hernia containing a portion of sigmoid colon.  No bowel obstruction or inflammation. Fatty supraumbilical and bilateral inguinal hernias present as well.  Evidence of prior umbilical hernia repair.  Bibasilar pulmonary micronodules measuring up to 0.4 cm.  Chest CT follow-up in 12 months recommended unless shorter interval follow-up is warranted by history.   Colonoscopy 4/17/2024.  Anal mass. The colon was normal from the rectum to the mid transverse, as I had to use an endoscope due to the severe anal stenosis.  No specimens collected.  The digital rectal exam revealed a firm, hard and obstructing anal mass. The mass was circumferential. Findings: The entire examined colon appeared normal  Pathology report 4/18/2024. 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  Phone note 4/18/2024.  Patient referred to oncology and colorectal surgery at [place].  CEA at 2.1 on 4/24/2024.  Pelvic MRI report on 4/25/2024. 4.5 cm circular solid lower rectum/anal canal neoplasm without evidence  of mucinous features. There is more inferior extent involving the anal canal at the 12:00/12:30 position and 6:00/6:30 position with involvement of the most inferior aspect of the right external anal sphincter. At the level of the anorectal junction there is also posterior extramural extent of tumor into the intersphincteric space abutting and likely involving the external anal sphincter. Two indeterminate right mesorectal lymph nodes.   Note from Dr. aKylie Stewart 5/1/2024. Plan for total neoadjuvant chemo.  CT chest report 5/3/2024. No definite evidence of metastatic disease in the chest.  Indeterminate 3 mm and 5 mm right lower lobe pulmonary nodules. Recommend a follow-up chest CT in 3 to 6 months to evaluate for stability. Hepatic steatosis. Cholelithiasis. Bilateral nonobstructing renal calculi.           PREVIOUS INTERVENTIONS:   He is undergoing neoadjuvant FOLFOX 5/21/2024 through present.     Rectal cancer   4/24/2024 Initial Diagnosis    Rectal cancer     5/21/2024 -  Chemotherapy    OP COLON [id number] OXALIplatin / Leucovorin / Fluorouracil (Q2W)         PAST MEDICAL HISTORY:  ALLERGIES:  No Known Allergies  CURRENT MEDICATIONS:  Outpatient Encounter Medications as of 1/14/2025   Medication Sig Dispense Refill    acetaminophen (Tylenol) 325 MG tablet Take 3 tablets by mouth Every 8 (Eight) Hours. Take every 8 hours for 3 days then take prn as needed.      albuterol sulfate  (90 Base) MCG/ACT inhaler Inhale 2 puffs Every 6 (Six) Hours As Needed.      HYDROcodone-acetaminophen (NORCO) 5-325 MG per tablet Take 1 tablet by mouth Every 6 (Six) Hours As Needed for Mild Pain or Moderate Pain. (Patient taking differently: Take 0.5 tablets by mouth At Night As Needed for Mild Pain or Moderate Pain (rectal pain from cancer).) 100 tablet 0    ibuprofen (ADVIL,MOTRIN) 200 MG tablet Take 2 tablets by mouth Every 6 (Six) Hours As Needed for Mild Pain.      lidocaine-prilocaine (EMLA) 2.5-2.5 % cream 30 minutes  prior to treatment apply generous amount of Emla Cream to your port site and cover with clear plastic wrap until ready for use 30 g 2    loratadine (CLARITIN) 10 MG tablet Take 1 tablet by mouth Daily.      multivitamin (THERAGRAN) tablet tablet Take 1 tablet by mouth Daily.      ondansetron (Zofran) 4 MG tablet Take 1 tablet by mouth Every 8 (Eight) Hours As Needed for Nausea or Vomiting. 15 tablet 0    ondansetron (Zofran) 8 MG tablet Take 1 tablet by mouth Every 8 (Eight) Hours As Needed for Nausea or Vomiting. 60 tablet 2    predniSONE (DELTASONE) 5 MG tablet Take 1 tablet by mouth Daily.      prochlorperazine (COMPAZINE) 10 MG tablet Take 1 tablet by mouth Every 4 (Four) Hours As Needed for Nausea or Vomiting. 60 tablet 2    temazepam (RESTORIL) 7.5 MG capsule Take 1 capsule by mouth At Night As Needed for Sleep. HAS NOT STARTED THIS Rx      traMADol (ULTRAM) 50 MG tablet Take 1 tablet by mouth Every 6 (Six) Hours As Needed for Moderate Pain. 180 tablet 0     No facility-administered encounter medications on file as of 1/14/2025.     ADULT ILLNESSES:  Patient Active Problem List   Diagnosis Code    Rectal bleed K62.5    Rectal cancer C20    Port-A-Cath in place Z95.828     SURGERIES:  Past Surgical History:   Procedure Laterality Date    COLONOSCOPY N/A 4/17/2024    Procedure: COLONOSCOPY WITH ANESTHESIA;  Surgeon: Salty Arias DO;  Location: [facility] ENDOSCOPY;  Service: Gastroenterology;  Laterality: N/A;  preop; rectal bleed   postop questionable anal mass   PCP Duy Lomeli    HEMORROIDECTOMY      HERNIA REPAIR      VENOUS ACCESS DEVICE (PORT) INSERTION N/A 5/16/2024    Procedure: Single Lumen Port-a-cath insertion with flouroscopy;  Surgeon: Rosa Mccall MD;  Location: [facility] OR;  Service: General;  Laterality: N/A;     HEALTH MAINTENANCE ITEMS:  Health Maintenance Due   Topic Date Due    BMI FOLLOWUP  Never done    TDAP/TD VACCINES (1 - Tdap) Never done    ZOSTER VACCINE (1 of 2) Never done     "Pneumococcal Vaccine 65+ (1 of 1 - PCV) Never done    HEPATITIS C SCREENING  Never done    ANNUAL WELLNESS VISIT  Never done    INFLUENZA VACCINE  07/01/2024    COVID-19 Vaccine (4 - 2024-25 season) 09/01/2024       <no information>  Last Completed Colonoscopy            Discontinued - COLORECTAL CANCER SCREENING  Discontinued        Frequency changed to Never automatically (Topic No Longer Applies)    05/01/2024  Outside Procedure: COLONOSCOPY    04/17/2024  Colonoscopy    04/17/2024  Surgical Procedure: COLONOSCOPY                  Immunization History   Administered Date(s) Administered    COVID-19 (MODERNA) 1st,2nd,3rd Dose Monovalent 04/22/2021, 05/24/2021    COVID-19 (MODERNA) Monovalent Original Booster 01/04/2022     Last Completed Mammogram       This patient has no relevant Health Maintenance data.              FAMILY HISTORY:  Family History   Problem Relation Age of Onset    Colon cancer Neg Hx     Colon polyps Neg Hx     Esophageal cancer Neg Hx      SOCIAL HISTORY:  Social History     Socioeconomic History    Marital status:    Tobacco Use    Smoking status: Never    Smokeless tobacco: Never   Vaping Use    Vaping status: Never Used   Substance and Sexual Activity    Alcohol use: Never    Drug use: Never    Sexual activity: Defer       REVIEW OF SYSTEMS:    Review of Systems   Constitutional:  Negative for fatigue and fever.        \"Feeling good today.\"   HENT:  Negative for congestion and mouth sores.    Eyes:  Negative for discharge and redness.   Respiratory:  Negative for shortness of breath and wheezing.    Cardiovascular:  Negative for chest pain and palpitations.   Gastrointestinal:  Positive for nausea. Negative for constipation, diarrhea and vomiting.   Endocrine: Negative for cold intolerance and heat intolerance.   Genitourinary:  Negative for difficulty urinating and dysuria.   Musculoskeletal:  Negative for gait problem and myalgias.   Skin:  Negative for pallor. " "  Allergic/Immunologic: Negative for food allergies.   Neurological:         \"Tingling sensation fingertips and bottom of feet and toes. Numbness on my fingertips.\"   Hematological:  Negative for adenopathy. Does not bruise/bleed easily.   Psychiatric/Behavioral:  Negative for agitation and confusion. The patient is not nervous/anxious.        VITAL SIGNS: /86   Pulse 82   Temp 97.7 °F (36.5 °C)   Ht 167.6 cm (65.98\")   Wt 109 kg (240 lb 14.4 oz)   SpO2 98%   BMI 38.90 kg/m²  Gained 4 pounds.   Pain Score    01/14/25 0911   PainSc: 0-No pain       PHYSICAL EXAMINATION:     Physical Exam  Vitals reviewed.   Constitutional:       General: He is not in acute distress.  HENT:      Head: Normocephalic and atraumatic.   Eyes:      General: No scleral icterus.  Cardiovascular:      Rate and Rhythm: Normal rate.   Pulmonary:      Effort: No respiratory distress.      Breath sounds: No wheezing.      Comments: Port, right. No erythema.   Abdominal:      General: Bowel sounds are normal.      Palpations: Abdomen is soft.   Musculoskeletal:         General: No swelling.      Cervical back: Neck supple.   Skin:     Coloration: Skin is not pale.   Neurological:      Mental Status: He is alert and oriented to person, place, and time.   Psychiatric:         Mood and Affect: Mood normal.         Behavior: Behavior normal.         Thought Content: Thought content normal.         Judgment: Judgment normal.         LABS    Lab Results - Last 18 Months   Lab Units 01/14/25  0844 12/17/24  0824 12/03/24  0848 11/12/24  0843 11/05/24  0838 10/29/24  0837 10/15/24  0845 07/30/24  0810 07/22/24  0740 04/24/24  1406 02/29/24  0859   HEMOGLOBIN g/dL 13.8 13.3 12.9* 13.3 13.7 12.9* 13.2   < > 13.6   < > 14.2   HEMATOCRIT % 43.7 41.6 41.4 43.1 43.0 41.6 42.6   < > 44.1   < > 46.0   MCV fL 84.0 83.9 85.0 85.2 84.5 84.2 84.7   < > 84.2   < > 84.4   WBC 10*3/mm3 5.92 6.90 5.83 6.10 6.00 3.66 6.09   < > 9.70   < > 6.2   RDW % 15.2 " 15.5* 15.8* 15.4 15.9* 15.7* 15.6*   < > 17.2*   < > 13.8   MPV fL 9.5 9.5 9.5 9.6 9.3 9.9 9.4   < > 10.6   < > 10.1   PLATELETS 10*3/mm3 175 217 198 174 192 94* 140   < > 60*   < > 298   NEUTROS ABS 10*3/mm3 2.21 2.83 1.40* 1.71 1.47* 1.26* 1.88   < > 7.15*   < > 3.5   LYMPHS ABS K/uL  --   --   --   --   --   --   --   --   --   --  1.6   MONOS ABS K/uL  --   --   --   --   --   --   --   --   --   --  1.00*   EOS ABS 10*3/mm3 0.00 0.00 0.00 0.00 0.00 0.00 0.00   < > 0.10   < > 0.06   BASOS ABS 10*3/mm3 0.12 0.00 0.06 0.00 0.00 0.00 0.00   < > 0.10   < > 0.10   IMMATURE GRANS (ABS) K/uL  --   --   --   --   --   --   --   --   --   --  0.0   NRBC /100 WBC  --   --   --   --   --   --   --   --  1.0*  --   --    NEUTROPHIL % % 37.4* 39.0* 24.0* 28.0* 24.5* 34.3* 29.9*   < > 70.7   < >  --    MONOCYTES % % 13.1* 17.0* 19.0* 22.0* 21.4* 14.1* 20.6*   < > 7.1   < >  --    BASOPHIL % % 2.0* 0.0 1.0 0.0 0.0 0.0 0.0   < > 1.0   < >  --    ATYP LYMPH % % 29.3* 19.0* 25.0* 17.0* 23.5* 13.1* 21.6*   < > 3.0   < >  --    ANISOCYTOSIS  Slight/1+  --  Slight/1+ Mod/2+ Mod/2+ Slight/1+ Slight/1+   < > Slight/1+  --   --     < > = values in this interval not displayed.       Lab Results - Last 18 Months   Lab Units 01/14/25  0844 12/17/24  0824 12/03/24  0848 11/12/24  0843 11/05/24  0838 10/29/24  0837   GLUCOSE mg/dL 91 103* 91 105* 81 118*   SODIUM mmol/L 137 139 139 139 140 138   POTASSIUM mmol/L 4.1 4.0 4.4 4.3 4.2 3.7   CO2 mmol/L 25.0 24.0 26.0 28.0 28.0 24.0   CHLORIDE mmol/L 101 103 102 102 103 104   ANION GAP mmol/L 11.0 12.0 11.0 9.0 9.0 10.0   CREATININE mg/dL 0.80 0.85 0.99 0.83 0.94 0.94   BUN mg/dL 10 10 7* 7* 9 9   BUN / CREAT RATIO  12.5 11.8 7.1 8.4 9.6 9.6   CALCIUM mg/dL 9.8 9.4 9.4 9.5 9.8 9.4   ALK PHOS U/L 106 100 105 107 111 104   TOTAL PROTEIN g/dL 7.9 7.4 7.5 7.4 8.1 7.5   ALT (SGPT) U/L 19 24 19 21 21 21   AST (SGOT) U/L 28 33 32 29 35 31   BILIRUBIN mg/dL 0.5 0.4 0.4 0.4 0.3 0.5   ALBUMIN g/dL 4.2 4.0  "3.9 3.9 4.2 4.0   GLOBULIN gm/dL 3.7 3.4 3.6 3.5 3.9 3.5       Lab Results - Last 18 Months   Lab Units 04/24/24  1406   CEA ng/mL 2.10       Lab Results - Last 18 Months   Lab Units 07/16/24  1459 06/18/24  0744 06/04/24  0722 02/29/24  0859   IRON mcg/dL 29*  --  97  --    TIBC mcg/dL 335  --  328  --    IRON SATURATION (TSAT) % 9*  --  30  --    FERRITIN ng/mL 1,735.00*  --  263.00  --    TSH uIU/mL  --   --   --  2.460   FOLATE ng/mL  --  >20.00  --   --        Antwan Stevenson reports a pain score of 0.          ASSESSMENT:  Adenocarcinoma the rectum. KIKE on 4/28/2024.  AJCC stage:IIIb (cT3, cN1b, cM0)  Treatment status: Patient is undergoing neoadjuvant FOLFOX from 5/21/2024 through present.  To be followed by CIV 5FU with radiation.   2.  Performance status of 0.  3.  Grade 2 neutropenia without fever from chemo.  Pegfilgrastim ordered as prophylaxis.  The patient had declined.  4.  Normocytic anemia secondary to chemotherapy.  5.  Grade 1 neuropathy, on observation.     6.  Elevated liver function test.  CT abdomen 8/2/2024 showed no liver metastasis.  7.  Neutrophilia from pegfilgrastim.  8.  Bibasilar pulmonary micronodules measuring up to 0.4 cm on 4/11/2024.  On observation.  9.  Thrombocytopenia from chemo.  Under observation.  10. ?Interstitial granulomatous dermatitis with arthritis.  Patient is taking prednisone 5 mg daily.           PLAN:   Re: Tolerance to neoadjuvant FOLFOX.  \"Little nausea.\"  2.   Re:  Heme status.  WBC 5.9, ANC 2.21, hemoglobin 13.8, hematocrit 43.7, MCV 84 and platelet 175. No dose reduction of oxaliplatin at this point.   3.   Re:  CMP. GFR 97.6, AST 28 and alkaline phosphatase 106.  4.   Re:  Magnesium latest 2.2.  5.   Re:  Grade 1 neuropathy, under observatin.   6.   Re: Intravenous fluid administration as indicated on D3 secondary to nausea and worsening fatigue.  7.  Proceed with total neoadjuvant FOLFOX if ANC at least 1.5.  Then, " "the patient will undergo neoadjuvant CIV 5-FU with radiation.   8.  For total of 12 cycles of neoadjuvant FOLFOX:  9.  Observe for allergic reactions, anaphylaxis, worsening neuropathy, nausea, vomiting, diarrhea, cold intolerance, cytopenias or alopecia.  10.  Schedule treatment C11D1 to 3 on 1/14/2025 and C12 D1 to D3 on 1/28/2025.  To be administered every 2 weeks:   Oxaliplatin 85 mg/m2 IVPB.  Leucovorin 400 mg/m2.  5- mg/ m2  IVP.  Begin 5-FU 2,400 mg/m2 IVPB over 46 hours.    11.    Premedicate with:  Aloxi 0.25 mg IVP.  Fosaprepitant 150 mg IV.  Decadron 12 mg IVPB over 20-30 min.  12.  He declined peg filgrastim D3 as primary prophylaxis. \"It hurts my liver.\" Cycle 3 on 6/18/2024 delayed due to ANC of 1.2.  Monitor for arthralgias. C7 delayed 9/10/2024 due to ANC of 1.3.  C8 delayed due to ANC of 0.72 andC9 delayed secondary to ANC of 1.2 and 1.4.  13.   Blood for weekly CBC with differential.  Magnesium and CMP every 2 weeks.  14.  eRx ondansetron 8 mg p.o. every 8 hours as needed for nausea and vomiting #60, 2 refills if needed.  15.  eRx prochlorperazine 10 mg p.o. every 4 hours as needed for nausea and vomiting #60, 2 refills if needed.  16.  eRx tramadol 50 mg p.o. every 6 hours as needed for pain, #180, 0 refill if needed.  17.  Continue care per primary care physician and the other specialists.  18.  Plan of care discussed with patient.  Understanding expressed.  He is agreeable to proceed.  19.  Advance Care Planning   ACP discussion was declined by the patient. Patient does not have an advance directive, information provided.   20. Return to office 4 weeks with pre office CEA, MRI pelvis and CT chest an abdomen.                I have reviewed the assessment and plan and verified the accuracy of it. No changes to assessment and plan since the information was documented. Raul Mayberry MD 01/14/25       I spent 32 total minutes, face-to-face, caring for Antwan today. Greater than 50% of this time " involved counseling and/or coordination of care as documented within this note.                (Salty Arias DO)  (Kaylie Stewart MD)   (Marty Benitez MD radiation oncology [place] [facility].  (Bruno Sanford MD)  (Duy Lomeli MD)

## 2025-01-14 ENCOUNTER — OFFICE VISIT (OUTPATIENT)
Dept: ONCOLOGY | Facility: CLINIC | Age: 65
End: 2025-01-14
Payer: MEDICARE

## 2025-01-14 ENCOUNTER — LAB (OUTPATIENT)
Dept: LAB | Facility: HOSPITAL | Age: 65
End: 2025-01-14
Payer: MEDICARE

## 2025-01-14 ENCOUNTER — INFUSION (OUTPATIENT)
Dept: ONCOLOGY | Facility: HOSPITAL | Age: 65
End: 2025-01-14
Payer: MEDICARE

## 2025-01-14 VITALS
HEART RATE: 75 BPM | SYSTOLIC BLOOD PRESSURE: 146 MMHG | DIASTOLIC BLOOD PRESSURE: 71 MMHG | WEIGHT: 240 LBS | RESPIRATION RATE: 18 BRPM | OXYGEN SATURATION: 93 % | HEIGHT: 66 IN | BODY MASS INDEX: 38.57 KG/M2 | TEMPERATURE: 97.1 F

## 2025-01-14 VITALS
WEIGHT: 240.9 LBS | BODY MASS INDEX: 38.72 KG/M2 | HEART RATE: 82 BPM | SYSTOLIC BLOOD PRESSURE: 136 MMHG | DIASTOLIC BLOOD PRESSURE: 86 MMHG | TEMPERATURE: 97.7 F | HEIGHT: 66 IN | OXYGEN SATURATION: 98 %

## 2025-01-14 DIAGNOSIS — Z95.828 PORT-A-CATH IN PLACE: ICD-10-CM

## 2025-01-14 DIAGNOSIS — C20 RECTAL CANCER: Primary | ICD-10-CM

## 2025-01-14 DIAGNOSIS — C20 RECTAL CANCER: ICD-10-CM

## 2025-01-14 LAB
ALBUMIN SERPL-MCNC: 4.2 G/DL (ref 3.5–5.2)
ALBUMIN/GLOB SERPL: 1.1 G/DL
ALP SERPL-CCNC: 106 U/L (ref 39–117)
ALT SERPL W P-5'-P-CCNC: 19 U/L (ref 1–41)
ANION GAP SERPL CALCULATED.3IONS-SCNC: 11 MMOL/L (ref 5–15)
ANISOCYTOSIS BLD QL: ABNORMAL
AST SERPL-CCNC: 28 U/L (ref 1–40)
BASOPHILS # BLD MANUAL: 0.12 10*3/MM3 (ref 0–0.2)
BASOPHILS NFR BLD MANUAL: 2 % (ref 0–1.5)
BILIRUB SERPL-MCNC: 0.5 MG/DL (ref 0–1.2)
BUN SERPL-MCNC: 10 MG/DL (ref 8–23)
BUN/CREAT SERPL: 12.5 (ref 7–25)
CALCIUM SPEC-SCNC: 9.8 MG/DL (ref 8.6–10.5)
CHLORIDE SERPL-SCNC: 101 MMOL/L (ref 98–107)
CO2 SERPL-SCNC: 25 MMOL/L (ref 22–29)
CREAT SERPL-MCNC: 0.8 MG/DL (ref 0.76–1.27)
DEPRECATED RDW RBC AUTO: 46.3 FL (ref 37–54)
EGFRCR SERPLBLD CKD-EPI 2021: 97.6 ML/MIN/1.73
EOSINOPHIL # BLD MANUAL: 0 10*3/MM3 (ref 0–0.4)
EOSINOPHIL NFR BLD MANUAL: 0 % (ref 0.3–6.2)
ERYTHROCYTE [DISTWIDTH] IN BLOOD BY AUTOMATED COUNT: 15.2 % (ref 12.3–15.4)
GLOBULIN UR ELPH-MCNC: 3.7 GM/DL
GLUCOSE SERPL-MCNC: 91 MG/DL (ref 65–99)
HCT VFR BLD AUTO: 43.7 % (ref 37.5–51)
HGB BLD-MCNC: 13.8 G/DL (ref 13–17.7)
LYMPHOCYTES # BLD MANUAL: 2.81 10*3/MM3 (ref 0.7–3.1)
LYMPHOCYTES NFR BLD MANUAL: 13.1 % (ref 5–12)
MAGNESIUM SERPL-MCNC: 2.2 MG/DL (ref 1.6–2.4)
MCH RBC QN AUTO: 26.5 PG (ref 26.6–33)
MCHC RBC AUTO-ENTMCNC: 31.6 G/DL (ref 31.5–35.7)
MCV RBC AUTO: 84 FL (ref 79–97)
MONOCYTES # BLD: 0.78 10*3/MM3 (ref 0.1–0.9)
NEUTROPHILS # BLD AUTO: 2.21 10*3/MM3 (ref 1.7–7)
NEUTROPHILS NFR BLD MANUAL: 37.4 % (ref 42.7–76)
PLAT MORPH BLD: NORMAL
PLATELET # BLD AUTO: 175 10*3/MM3 (ref 140–450)
PMV BLD AUTO: 9.5 FL (ref 6–12)
POIKILOCYTOSIS BLD QL SMEAR: ABNORMAL
POTASSIUM SERPL-SCNC: 4.1 MMOL/L (ref 3.5–5.2)
PROT SERPL-MCNC: 7.9 G/DL (ref 6–8.5)
RBC # BLD AUTO: 5.2 10*6/MM3 (ref 4.14–5.8)
SODIUM SERPL-SCNC: 137 MMOL/L (ref 136–145)
STOMATOCYTES BLD QL SMEAR: ABNORMAL
VARIANT LYMPHS NFR BLD MANUAL: 18.2 % (ref 19.6–45.3)
VARIANT LYMPHS NFR BLD MANUAL: 29.3 % (ref 0–5)
WBC MORPH BLD: NORMAL
WBC NRBC COR # BLD AUTO: 5.92 10*3/MM3 (ref 3.4–10.8)

## 2025-01-14 PROCEDURE — 85025 COMPLETE CBC W/AUTO DIFF WBC: CPT

## 2025-01-14 PROCEDURE — 25010000002 PALONOSETRON PER 25 MCG: Performed by: INTERNAL MEDICINE

## 2025-01-14 PROCEDURE — 96366 THER/PROPH/DIAG IV INF ADDON: CPT

## 2025-01-14 PROCEDURE — 25010000003 DEXTROSE 5 % SOLUTION: Performed by: INTERNAL MEDICINE

## 2025-01-14 PROCEDURE — 96375 TX/PRO/DX INJ NEW DRUG ADDON: CPT

## 2025-01-14 PROCEDURE — 96411 CHEMO IV PUSH ADDL DRUG: CPT

## 2025-01-14 PROCEDURE — 96415 CHEMO IV INFUSION ADDL HR: CPT

## 2025-01-14 PROCEDURE — 25010000002 FLUOROURACIL PER 500 MG: Performed by: INTERNAL MEDICINE

## 2025-01-14 PROCEDURE — 25010000002 LEUCOVORIN 500 MG RECONSTITUTED SOLUTION 1 EACH VIAL: Performed by: INTERNAL MEDICINE

## 2025-01-14 PROCEDURE — 80053 COMPREHEN METABOLIC PANEL: CPT

## 2025-01-14 PROCEDURE — 36415 COLL VENOUS BLD VENIPUNCTURE: CPT

## 2025-01-14 PROCEDURE — 85007 BL SMEAR W/DIFF WBC COUNT: CPT

## 2025-01-14 PROCEDURE — 96413 CHEMO IV INFUSION 1 HR: CPT

## 2025-01-14 PROCEDURE — G0498 CHEMO EXTEND IV INFUS W/PUMP: HCPCS

## 2025-01-14 PROCEDURE — 25010000002 DEXAMETHASONE PER 1 MG: Performed by: INTERNAL MEDICINE

## 2025-01-14 PROCEDURE — 63710000001 APREPITANT PER 5 MG: Performed by: INTERNAL MEDICINE

## 2025-01-14 PROCEDURE — 25010000002 OXALIPLATIN PER 0.5 MG: Performed by: INTERNAL MEDICINE

## 2025-01-14 PROCEDURE — 25010000003 DEXTROSE 5 % SOLUTION 250 ML FLEX CONT: Performed by: INTERNAL MEDICINE

## 2025-01-14 PROCEDURE — 96368 THER/DIAG CONCURRENT INF: CPT

## 2025-01-14 PROCEDURE — 83735 ASSAY OF MAGNESIUM: CPT

## 2025-01-14 RX ORDER — FAMOTIDINE 10 MG/ML
20 INJECTION, SOLUTION INTRAVENOUS AS NEEDED
Status: DISCONTINUED | OUTPATIENT
Start: 2025-01-14 | End: 2025-01-14 | Stop reason: HOSPADM

## 2025-01-14 RX ORDER — FLUOROURACIL 50 MG/ML
400 INJECTION, SOLUTION INTRAVENOUS ONCE
Status: COMPLETED | OUTPATIENT
Start: 2025-01-14 | End: 2025-01-14

## 2025-01-14 RX ORDER — PALONOSETRON 0.05 MG/ML
0.25 INJECTION, SOLUTION INTRAVENOUS ONCE
Status: COMPLETED | OUTPATIENT
Start: 2025-01-14 | End: 2025-01-14

## 2025-01-14 RX ORDER — HYDROCORTISONE SODIUM SUCCINATE 100 MG/2ML
100 INJECTION INTRAMUSCULAR; INTRAVENOUS AS NEEDED
Status: DISCONTINUED | OUTPATIENT
Start: 2025-01-14 | End: 2025-01-14 | Stop reason: HOSPADM

## 2025-01-14 RX ORDER — APREPITANT 40 MG/1
80 CAPSULE ORAL DAILY
Status: DISCONTINUED | OUTPATIENT
Start: 2025-01-14 | End: 2025-01-14 | Stop reason: HOSPADM

## 2025-01-14 RX ORDER — DEXAMETHASONE SODIUM PHOSPHATE 10 MG/ML
10 INJECTION INTRAMUSCULAR; INTRAVENOUS ONCE
Status: COMPLETED | OUTPATIENT
Start: 2025-01-14 | End: 2025-01-14

## 2025-01-14 RX ORDER — DIPHENHYDRAMINE HYDROCHLORIDE 50 MG/ML
50 INJECTION INTRAMUSCULAR; INTRAVENOUS AS NEEDED
Status: DISCONTINUED | OUTPATIENT
Start: 2025-01-14 | End: 2025-01-14 | Stop reason: HOSPADM

## 2025-01-14 RX ORDER — DEXTROSE MONOHYDRATE 50 MG/ML
20 INJECTION, SOLUTION INTRAVENOUS ONCE
Status: COMPLETED | OUTPATIENT
Start: 2025-01-14 | End: 2025-01-14

## 2025-01-14 RX ADMIN — OXALIPLATIN 185 MG: 5 INJECTION, SOLUTION, CONCENTRATE INTRAVENOUS at 11:39

## 2025-01-14 RX ADMIN — PALONOSETRON HYDROCHLORIDE 0.25 MG: 0.25 INJECTION, SOLUTION INTRAVENOUS at 10:37

## 2025-01-14 RX ADMIN — DEXAMETHASONE SODIUM PHOSPHATE 10 MG: 10 INJECTION INTRAMUSCULAR; INTRAVENOUS at 10:32

## 2025-01-14 RX ADMIN — FLUOROURACIL 5160 MG: 50 INJECTION, SOLUTION INTRAVENOUS at 14:37

## 2025-01-14 RX ADMIN — FLUOROURACIL 860 MG: 50 INJECTION, SOLUTION INTRAVENOUS at 14:31

## 2025-01-14 RX ADMIN — LEUCOVORIN CALCIUM 860 MG: 500 INJECTION, POWDER, LYOPHILIZED, FOR SOLUTION INTRAMUSCULAR; INTRAVENOUS at 11:40

## 2025-01-14 RX ADMIN — DEXTROSE MONOHYDRATE 20 ML/HR: 50 INJECTION, SOLUTION INTRAVENOUS at 10:32

## 2025-01-14 RX ADMIN — APREPITANT 80 MG: 40 CAPSULE ORAL at 10:32

## 2025-01-16 ENCOUNTER — INFUSION (OUTPATIENT)
Dept: ONCOLOGY | Facility: HOSPITAL | Age: 65
End: 2025-01-16
Payer: MEDICARE

## 2025-01-16 VITALS
DIASTOLIC BLOOD PRESSURE: 68 MMHG | BODY MASS INDEX: 39.33 KG/M2 | HEART RATE: 76 BPM | OXYGEN SATURATION: 94 % | TEMPERATURE: 97 F | RESPIRATION RATE: 18 BRPM | SYSTOLIC BLOOD PRESSURE: 139 MMHG | WEIGHT: 243.5 LBS

## 2025-01-16 DIAGNOSIS — Z95.828 PORT-A-CATH IN PLACE: Primary | ICD-10-CM

## 2025-01-16 PROCEDURE — 25010000002 HEPARIN LOCK FLUSH PER 10 UNITS: Performed by: INTERNAL MEDICINE

## 2025-01-16 RX ORDER — SODIUM CHLORIDE 0.9 % (FLUSH) 0.9 %
10 SYRINGE (ML) INJECTION AS NEEDED
Status: DISCONTINUED | OUTPATIENT
Start: 2025-01-16 | End: 2025-01-16 | Stop reason: HOSPADM

## 2025-01-16 RX ORDER — HEPARIN SODIUM (PORCINE) LOCK FLUSH IV SOLN 100 UNIT/ML 100 UNIT/ML
500 SOLUTION INTRAVENOUS AS NEEDED
OUTPATIENT
Start: 2025-01-16

## 2025-01-16 RX ORDER — SODIUM CHLORIDE 0.9 % (FLUSH) 0.9 %
10 SYRINGE (ML) INJECTION AS NEEDED
OUTPATIENT
Start: 2025-01-16

## 2025-01-16 RX ORDER — HEPARIN SODIUM (PORCINE) LOCK FLUSH IV SOLN 100 UNIT/ML 100 UNIT/ML
500 SOLUTION INTRAVENOUS AS NEEDED
Status: DISCONTINUED | OUTPATIENT
Start: 2025-01-16 | End: 2025-01-16 | Stop reason: HOSPADM

## 2025-01-16 RX ADMIN — HEPARIN 500 UNITS: 100 SYRINGE at 10:41

## 2025-01-16 RX ADMIN — Medication 10 ML: at 10:40

## 2025-01-22 DIAGNOSIS — C20 RECTAL CANCER: Primary | ICD-10-CM

## 2025-01-22 DIAGNOSIS — Z95.828 PORT-A-CATH IN PLACE: ICD-10-CM

## 2025-01-22 RX ORDER — HYDROCORTISONE SODIUM SUCCINATE 100 MG/2ML
100 INJECTION INTRAMUSCULAR; INTRAVENOUS AS NEEDED
OUTPATIENT
Start: 2025-01-28

## 2025-01-22 RX ORDER — DIPHENHYDRAMINE HYDROCHLORIDE 50 MG/ML
50 INJECTION INTRAMUSCULAR; INTRAVENOUS AS NEEDED
OUTPATIENT
Start: 2025-01-28

## 2025-01-22 RX ORDER — APREPITANT 80 MG/1
80 CAPSULE ORAL DAILY
Start: 2025-01-28

## 2025-01-22 RX ORDER — DEXTROSE MONOHYDRATE 50 MG/ML
20 INJECTION, SOLUTION INTRAVENOUS ONCE
OUTPATIENT
Start: 2025-01-28

## 2025-01-22 RX ORDER — PALONOSETRON 0.05 MG/ML
0.25 INJECTION, SOLUTION INTRAVENOUS ONCE
OUTPATIENT
Start: 2025-01-28

## 2025-01-22 RX ORDER — DEXAMETHASONE SODIUM PHOSPHATE 10 MG/ML
10 INJECTION INTRAMUSCULAR; INTRAVENOUS ONCE
Start: 2025-01-28 | End: 2025-01-28

## 2025-01-22 RX ORDER — FLUOROURACIL 50 MG/ML
400 INJECTION, SOLUTION INTRAVENOUS ONCE
OUTPATIENT
Start: 2025-01-28

## 2025-01-22 RX ORDER — FAMOTIDINE 10 MG/ML
20 INJECTION, SOLUTION INTRAVENOUS AS NEEDED
OUTPATIENT
Start: 2025-01-28

## 2025-02-04 ENCOUNTER — INFUSION (OUTPATIENT)
Dept: ONCOLOGY | Facility: HOSPITAL | Age: 65
End: 2025-02-04
Payer: MEDICARE

## 2025-02-04 ENCOUNTER — LAB (OUTPATIENT)
Dept: LAB | Facility: HOSPITAL | Age: 65
End: 2025-02-04
Payer: MEDICARE

## 2025-02-04 VITALS
RESPIRATION RATE: 18 BRPM | WEIGHT: 241 LBS | SYSTOLIC BLOOD PRESSURE: 128 MMHG | HEART RATE: 93 BPM | OXYGEN SATURATION: 96 % | DIASTOLIC BLOOD PRESSURE: 71 MMHG | BODY MASS INDEX: 38.73 KG/M2 | TEMPERATURE: 96.5 F | HEIGHT: 66 IN

## 2025-02-04 DIAGNOSIS — T45.1X5A ANEMIA DUE TO ANTINEOPLASTIC CHEMOTHERAPY: ICD-10-CM

## 2025-02-04 DIAGNOSIS — Z95.828 PORT-A-CATH IN PLACE: ICD-10-CM

## 2025-02-04 DIAGNOSIS — C20 RECTAL CANCER: ICD-10-CM

## 2025-02-04 DIAGNOSIS — D64.81 ANEMIA DUE TO ANTINEOPLASTIC CHEMOTHERAPY: ICD-10-CM

## 2025-02-04 DIAGNOSIS — C20 RECTAL CANCER: Primary | ICD-10-CM

## 2025-02-04 LAB
ALBUMIN SERPL-MCNC: 4 G/DL (ref 3.5–5.2)
ALBUMIN/GLOB SERPL: 1.1 G/DL
ALP SERPL-CCNC: 98 U/L (ref 39–117)
ALT SERPL W P-5'-P-CCNC: 21 U/L (ref 1–41)
ANION GAP SERPL CALCULATED.3IONS-SCNC: 10 MMOL/L (ref 5–15)
ANISOCYTOSIS BLD QL: ABNORMAL
AST SERPL-CCNC: 32 U/L (ref 1–40)
BASOPHILS # BLD MANUAL: 0 10*3/MM3 (ref 0–0.2)
BASOPHILS NFR BLD MANUAL: 0 % (ref 0–1.5)
BILIRUB SERPL-MCNC: 0.4 MG/DL (ref 0–1.2)
BUN SERPL-MCNC: 8 MG/DL (ref 8–23)
BUN/CREAT SERPL: 9.2 (ref 7–25)
CALCIUM SPEC-SCNC: 9.2 MG/DL (ref 8.6–10.5)
CHLORIDE SERPL-SCNC: 105 MMOL/L (ref 98–107)
CO2 SERPL-SCNC: 26 MMOL/L (ref 22–29)
CREAT SERPL-MCNC: 0.87 MG/DL (ref 0.76–1.27)
DEPRECATED RDW RBC AUTO: 45.8 FL (ref 37–54)
EGFRCR SERPLBLD CKD-EPI 2021: 95.2 ML/MIN/1.73
EOSINOPHIL # BLD MANUAL: 0 10*3/MM3 (ref 0–0.4)
EOSINOPHIL NFR BLD MANUAL: 0 % (ref 0.3–6.2)
ERYTHROCYTE [DISTWIDTH] IN BLOOD BY AUTOMATED COUNT: 15.3 % (ref 12.3–15.4)
GIANT PLATELETS: ABNORMAL
GLOBULIN UR ELPH-MCNC: 3.5 GM/DL
GLUCOSE SERPL-MCNC: 112 MG/DL (ref 65–99)
HCT VFR BLD AUTO: 40.3 % (ref 37.5–51)
HGB BLD-MCNC: 12.9 G/DL (ref 13–17.7)
LYMPHOCYTES # BLD MANUAL: 2.76 10*3/MM3 (ref 0.7–3.1)
LYMPHOCYTES NFR BLD MANUAL: 17 % (ref 5–12)
MAGNESIUM SERPL-MCNC: 2.1 MG/DL (ref 1.6–2.4)
MCH RBC QN AUTO: 26.8 PG (ref 26.6–33)
MCHC RBC AUTO-ENTMCNC: 32 G/DL (ref 31.5–35.7)
MCV RBC AUTO: 83.8 FL (ref 79–97)
MONOCYTES # BLD: 0.84 10*3/MM3 (ref 0.1–0.9)
NEUTROPHILS # BLD AUTO: 1.33 10*3/MM3 (ref 1.7–7)
NEUTROPHILS NFR BLD MANUAL: 25 % (ref 42.7–76)
NEUTS BAND NFR BLD MANUAL: 2 % (ref 0–5)
PLATELET # BLD AUTO: 209 10*3/MM3 (ref 140–450)
PMV BLD AUTO: 9.7 FL (ref 6–12)
POTASSIUM SERPL-SCNC: 4.2 MMOL/L (ref 3.5–5.2)
PROT SERPL-MCNC: 7.5 G/DL (ref 6–8.5)
RBC # BLD AUTO: 4.81 10*6/MM3 (ref 4.14–5.8)
SODIUM SERPL-SCNC: 141 MMOL/L (ref 136–145)
VARIANT LYMPHS NFR BLD MANUAL: 21 % (ref 0–5)
VARIANT LYMPHS NFR BLD MANUAL: 35 % (ref 19.6–45.3)
WBC MORPH BLD: NORMAL
WBC NRBC COR # BLD AUTO: 4.92 10*3/MM3 (ref 3.4–10.8)

## 2025-02-04 PROCEDURE — G0463 HOSPITAL OUTPT CLINIC VISIT: HCPCS

## 2025-02-04 PROCEDURE — 85025 COMPLETE CBC W/AUTO DIFF WBC: CPT

## 2025-02-04 PROCEDURE — 80053 COMPREHEN METABOLIC PANEL: CPT

## 2025-02-04 PROCEDURE — 85007 BL SMEAR W/DIFF WBC COUNT: CPT

## 2025-02-04 PROCEDURE — 83735 ASSAY OF MAGNESIUM: CPT

## 2025-02-04 PROCEDURE — 36415 COLL VENOUS BLD VENIPUNCTURE: CPT

## 2025-02-04 RX ORDER — DIPHENHYDRAMINE HYDROCHLORIDE 50 MG/ML
50 INJECTION INTRAMUSCULAR; INTRAVENOUS AS NEEDED
OUTPATIENT
Start: 2025-02-12

## 2025-02-04 RX ORDER — FLUOROURACIL 50 MG/ML
400 INJECTION, SOLUTION INTRAVENOUS ONCE
OUTPATIENT
Start: 2025-02-12

## 2025-02-04 RX ORDER — APREPITANT 80 MG/1
80 CAPSULE ORAL DAILY
Start: 2025-02-12

## 2025-02-04 RX ORDER — FAMOTIDINE 10 MG/ML
20 INJECTION, SOLUTION INTRAVENOUS AS NEEDED
OUTPATIENT
Start: 2025-02-12

## 2025-02-04 RX ORDER — DEXAMETHASONE SODIUM PHOSPHATE 10 MG/ML
10 INJECTION INTRAMUSCULAR; INTRAVENOUS ONCE
Start: 2025-02-12 | End: 2025-02-12

## 2025-02-04 RX ORDER — DEXTROSE MONOHYDRATE 50 MG/ML
20 INJECTION, SOLUTION INTRAVENOUS ONCE
OUTPATIENT
Start: 2025-02-12

## 2025-02-04 RX ORDER — HYDROCORTISONE SODIUM SUCCINATE 100 MG/2ML
100 INJECTION INTRAMUSCULAR; INTRAVENOUS AS NEEDED
OUTPATIENT
Start: 2025-02-12

## 2025-02-04 RX ORDER — PALONOSETRON 0.05 MG/ML
0.25 INJECTION, SOLUTION INTRAVENOUS ONCE
OUTPATIENT
Start: 2025-02-12

## 2025-02-04 NOTE — PROGRESS NOTES
S/W Dr. Mayberry/María Elena regarding The patient is due for Tx.  Anc 1.33   please advise.  STAN Jaquez.  To hold chemo today and R/S to next week.   The office notified has a CT scan and a MRI next week and will have a chemo pump infusing, will need to R/S scans. STAN Jaquez

## 2025-02-12 ENCOUNTER — INFUSION (OUTPATIENT)
Dept: ONCOLOGY | Facility: HOSPITAL | Age: 65
End: 2025-02-12
Payer: MEDICARE

## 2025-02-12 ENCOUNTER — LAB (OUTPATIENT)
Dept: LAB | Facility: HOSPITAL | Age: 65
End: 2025-02-12
Payer: MEDICARE

## 2025-02-12 ENCOUNTER — APPOINTMENT (OUTPATIENT)
Dept: MRI IMAGING | Facility: HOSPITAL | Age: 65
End: 2025-02-12
Payer: MEDICARE

## 2025-02-12 VITALS
TEMPERATURE: 97.2 F | HEART RATE: 79 BPM | OXYGEN SATURATION: 96 % | RESPIRATION RATE: 18 BRPM | HEIGHT: 66 IN | WEIGHT: 240.8 LBS | BODY MASS INDEX: 38.7 KG/M2 | DIASTOLIC BLOOD PRESSURE: 72 MMHG | SYSTOLIC BLOOD PRESSURE: 152 MMHG

## 2025-02-12 DIAGNOSIS — Z95.828 PORT-A-CATH IN PLACE: ICD-10-CM

## 2025-02-12 DIAGNOSIS — C20 RECTAL CANCER: ICD-10-CM

## 2025-02-12 DIAGNOSIS — C20 RECTAL CANCER: Primary | ICD-10-CM

## 2025-02-12 LAB
ALBUMIN SERPL-MCNC: 4 G/DL (ref 3.5–5.2)
ALBUMIN/GLOB SERPL: 1.1 G/DL
ALP SERPL-CCNC: 100 U/L (ref 39–117)
ALT SERPL W P-5'-P-CCNC: 22 U/L (ref 1–41)
ANION GAP SERPL CALCULATED.3IONS-SCNC: 11 MMOL/L (ref 5–15)
ANISOCYTOSIS BLD QL: ABNORMAL
AST SERPL-CCNC: 31 U/L (ref 1–40)
BASOPHILS # BLD MANUAL: 0 10*3/MM3 (ref 0–0.2)
BASOPHILS NFR BLD MANUAL: 0 % (ref 0–1.5)
BILIRUB SERPL-MCNC: 0.5 MG/DL (ref 0–1.2)
BUN SERPL-MCNC: 10 MG/DL (ref 8–23)
BUN/CREAT SERPL: 10.4 (ref 7–25)
CALCIUM SPEC-SCNC: 9.4 MG/DL (ref 8.6–10.5)
CEA SERPL-MCNC: 1.47 NG/ML
CHLORIDE SERPL-SCNC: 104 MMOL/L (ref 98–107)
CO2 SERPL-SCNC: 24 MMOL/L (ref 22–29)
CREAT SERPL-MCNC: 0.96 MG/DL (ref 0.76–1.27)
DEPRECATED RDW RBC AUTO: 45.1 FL (ref 37–54)
EGFRCR SERPLBLD CKD-EPI 2021: 87.2 ML/MIN/1.73
EOSINOPHIL # BLD MANUAL: 0.05 10*3/MM3 (ref 0–0.4)
EOSINOPHIL NFR BLD MANUAL: 0.8 % (ref 0.3–6.2)
ERYTHROCYTE [DISTWIDTH] IN BLOOD BY AUTOMATED COUNT: 15 % (ref 12.3–15.4)
GLOBULIN UR ELPH-MCNC: 3.7 GM/DL
GLUCOSE SERPL-MCNC: 102 MG/DL (ref 65–99)
HCT VFR BLD AUTO: 42.7 % (ref 37.5–51)
HGB BLD-MCNC: 13.7 G/DL (ref 13–17.7)
LYMPHOCYTES # BLD MANUAL: 2.37 10*3/MM3 (ref 0.7–3.1)
LYMPHOCYTES NFR BLD MANUAL: 14.4 % (ref 5–12)
MAGNESIUM SERPL-MCNC: 2.1 MG/DL (ref 1.6–2.4)
MCH RBC QN AUTO: 26.4 PG (ref 26.6–33)
MCHC RBC AUTO-ENTMCNC: 32.1 G/DL (ref 31.5–35.7)
MCV RBC AUTO: 82.4 FL (ref 79–97)
MICROCYTES BLD QL: ABNORMAL
MONOCYTES # BLD: 0.91 10*3/MM3 (ref 0.1–0.9)
MYELOCYTES NFR BLD MANUAL: 1.6 % (ref 0–0)
NEUTROPHILS # BLD AUTO: 2.87 10*3/MM3 (ref 1.7–7)
NEUTROPHILS NFR BLD MANUAL: 44.8 % (ref 42.7–76)
NEUTS BAND NFR BLD MANUAL: 0.8 % (ref 0–5)
PLAT MORPH BLD: NORMAL
PLATELET # BLD AUTO: 166 10*3/MM3 (ref 140–450)
PMV BLD AUTO: 9.5 FL (ref 6–12)
POLYCHROMASIA BLD QL SMEAR: ABNORMAL
POTASSIUM SERPL-SCNC: 4.1 MMOL/L (ref 3.5–5.2)
PROT SERPL-MCNC: 7.7 G/DL (ref 6–8.5)
RBC # BLD AUTO: 5.18 10*6/MM3 (ref 4.14–5.8)
SODIUM SERPL-SCNC: 139 MMOL/L (ref 136–145)
STOMATOCYTES BLD QL SMEAR: ABNORMAL
VARIANT LYMPHS NFR BLD MANUAL: 16.8 % (ref 0–5)
VARIANT LYMPHS NFR BLD MANUAL: 20.8 % (ref 19.6–45.3)
WBC MORPH BLD: NORMAL
WBC NRBC COR # BLD AUTO: 6.3 10*3/MM3 (ref 3.4–10.8)

## 2025-02-12 PROCEDURE — 25010000002 DEXAMETHASONE PER 1 MG: Performed by: INTERNAL MEDICINE

## 2025-02-12 PROCEDURE — 25010000003 DEXTROSE 5 % SOLUTION 250 ML FLEX CONT: Performed by: INTERNAL MEDICINE

## 2025-02-12 PROCEDURE — 96375 TX/PRO/DX INJ NEW DRUG ADDON: CPT

## 2025-02-12 PROCEDURE — 63710000001 APREPITANT PER 5 MG: Performed by: INTERNAL MEDICINE

## 2025-02-12 PROCEDURE — 25010000002 LEUCOVORIN 200 MG RECONSTITUTED SOLUTION 200 MG VIAL: Performed by: INTERNAL MEDICINE

## 2025-02-12 PROCEDURE — 80053 COMPREHEN METABOLIC PANEL: CPT

## 2025-02-12 PROCEDURE — 83735 ASSAY OF MAGNESIUM: CPT

## 2025-02-12 PROCEDURE — 96415 CHEMO IV INFUSION ADDL HR: CPT

## 2025-02-12 PROCEDURE — 82378 CARCINOEMBRYONIC ANTIGEN: CPT

## 2025-02-12 PROCEDURE — 96417 CHEMO IV INFUS EACH ADDL SEQ: CPT

## 2025-02-12 PROCEDURE — 85007 BL SMEAR W/DIFF WBC COUNT: CPT

## 2025-02-12 PROCEDURE — 25010000002 LEUCOVORIN 500 MG RECONSTITUTED SOLUTION 1 EACH VIAL: Performed by: INTERNAL MEDICINE

## 2025-02-12 PROCEDURE — 36415 COLL VENOUS BLD VENIPUNCTURE: CPT

## 2025-02-12 PROCEDURE — 25010000003 DEXTROSE 5 % SOLUTION: Performed by: INTERNAL MEDICINE

## 2025-02-12 PROCEDURE — 25010000002 FLUOROURACIL PER 500 MG: Performed by: INTERNAL MEDICINE

## 2025-02-12 PROCEDURE — G0498 CHEMO EXTEND IV INFUS W/PUMP: HCPCS

## 2025-02-12 PROCEDURE — 96413 CHEMO IV INFUSION 1 HR: CPT

## 2025-02-12 PROCEDURE — 96411 CHEMO IV PUSH ADDL DRUG: CPT

## 2025-02-12 PROCEDURE — 85025 COMPLETE CBC W/AUTO DIFF WBC: CPT

## 2025-02-12 PROCEDURE — 96368 THER/DIAG CONCURRENT INF: CPT

## 2025-02-12 PROCEDURE — 25010000002 OXALIPLATIN PER 0.5 MG: Performed by: INTERNAL MEDICINE

## 2025-02-12 PROCEDURE — 25010000002 PALONOSETRON PER 25 MCG: Performed by: INTERNAL MEDICINE

## 2025-02-12 RX ORDER — FAMOTIDINE 10 MG/ML
20 INJECTION, SOLUTION INTRAVENOUS AS NEEDED
Status: DISCONTINUED | OUTPATIENT
Start: 2025-02-12 | End: 2025-02-12 | Stop reason: HOSPADM

## 2025-02-12 RX ORDER — PALONOSETRON 0.05 MG/ML
0.25 INJECTION, SOLUTION INTRAVENOUS ONCE
Status: COMPLETED | OUTPATIENT
Start: 2025-02-12 | End: 2025-02-12

## 2025-02-12 RX ORDER — HYDROCORTISONE SODIUM SUCCINATE 100 MG/2ML
100 INJECTION INTRAMUSCULAR; INTRAVENOUS AS NEEDED
Status: DISCONTINUED | OUTPATIENT
Start: 2025-02-12 | End: 2025-02-12 | Stop reason: HOSPADM

## 2025-02-12 RX ORDER — APREPITANT 40 MG/1
80 CAPSULE ORAL DAILY
Status: DISCONTINUED | OUTPATIENT
Start: 2025-02-12 | End: 2025-02-12 | Stop reason: HOSPADM

## 2025-02-12 RX ORDER — DIPHENHYDRAMINE HYDROCHLORIDE 50 MG/ML
50 INJECTION INTRAMUSCULAR; INTRAVENOUS AS NEEDED
Status: DISCONTINUED | OUTPATIENT
Start: 2025-02-12 | End: 2025-02-12 | Stop reason: HOSPADM

## 2025-02-12 RX ORDER — FLUOROURACIL 50 MG/ML
400 INJECTION, SOLUTION INTRAVENOUS ONCE
Status: COMPLETED | OUTPATIENT
Start: 2025-02-12 | End: 2025-02-12

## 2025-02-12 RX ORDER — DEXTROSE MONOHYDRATE 50 MG/ML
20 INJECTION, SOLUTION INTRAVENOUS ONCE
Status: COMPLETED | OUTPATIENT
Start: 2025-02-12 | End: 2025-02-12

## 2025-02-12 RX ORDER — DEXAMETHASONE SODIUM PHOSPHATE 10 MG/ML
10 INJECTION INTRAMUSCULAR; INTRAVENOUS ONCE
Status: COMPLETED | OUTPATIENT
Start: 2025-02-12 | End: 2025-02-12

## 2025-02-12 RX ADMIN — OXALIPLATIN 185 MG: 5 INJECTION, SOLUTION, CONCENTRATE INTRAVENOUS at 11:20

## 2025-02-12 RX ADMIN — FLUOROURACIL 860 MG: 50 INJECTION, SOLUTION INTRAVENOUS at 13:50

## 2025-02-12 RX ADMIN — APREPITANT 80 MG: 40 CAPSULE ORAL at 10:18

## 2025-02-12 RX ADMIN — LEUCOVORIN CALCIUM 860 MG: 500 INJECTION, POWDER, LYOPHILIZED, FOR SOLUTION INTRAMUSCULAR; INTRAVENOUS at 11:20

## 2025-02-12 RX ADMIN — FLUOROURACIL 5160 MG: 50 INJECTION, SOLUTION INTRAVENOUS at 13:58

## 2025-02-12 RX ADMIN — DEXAMETHASONE SODIUM PHOSPHATE 10 MG: 10 INJECTION INTRAMUSCULAR; INTRAVENOUS at 10:20

## 2025-02-12 RX ADMIN — PALONOSETRON HYDROCHLORIDE 0.25 MG: 0.25 INJECTION, SOLUTION INTRAVENOUS at 10:18

## 2025-02-12 RX ADMIN — DEXTROSE MONOHYDRATE 20 ML/HR: 50 INJECTION, SOLUTION INTRAVENOUS at 10:10

## 2025-02-14 ENCOUNTER — INFUSION (OUTPATIENT)
Dept: ONCOLOGY | Facility: HOSPITAL | Age: 65
End: 2025-02-14
Payer: MEDICARE

## 2025-02-14 VITALS
HEIGHT: 66 IN | HEART RATE: 94 BPM | WEIGHT: 243 LBS | SYSTOLIC BLOOD PRESSURE: 141 MMHG | RESPIRATION RATE: 18 BRPM | OXYGEN SATURATION: 96 % | TEMPERATURE: 97 F | BODY MASS INDEX: 39.05 KG/M2 | DIASTOLIC BLOOD PRESSURE: 62 MMHG

## 2025-02-14 DIAGNOSIS — Z95.828 PORT-A-CATH IN PLACE: ICD-10-CM

## 2025-02-14 DIAGNOSIS — C20 RECTAL CANCER: Primary | ICD-10-CM

## 2025-02-14 PROCEDURE — 25010000002 HEPARIN LOCK FLUSH PER 10 UNITS: Performed by: INTERNAL MEDICINE

## 2025-02-14 RX ORDER — SODIUM CHLORIDE 0.9 % (FLUSH) 0.9 %
10 SYRINGE (ML) INJECTION AS NEEDED
OUTPATIENT
Start: 2025-02-14

## 2025-02-14 RX ORDER — HEPARIN SODIUM (PORCINE) LOCK FLUSH IV SOLN 100 UNIT/ML 100 UNIT/ML
500 SOLUTION INTRAVENOUS AS NEEDED
OUTPATIENT
Start: 2025-02-14

## 2025-02-14 RX ORDER — HEPARIN SODIUM (PORCINE) LOCK FLUSH IV SOLN 100 UNIT/ML 100 UNIT/ML
500 SOLUTION INTRAVENOUS AS NEEDED
Status: DISCONTINUED | OUTPATIENT
Start: 2025-02-14 | End: 2025-02-14 | Stop reason: HOSPADM

## 2025-02-14 RX ORDER — SODIUM CHLORIDE 0.9 % (FLUSH) 0.9 %
10 SYRINGE (ML) INJECTION AS NEEDED
Status: DISCONTINUED | OUTPATIENT
Start: 2025-02-14 | End: 2025-02-14 | Stop reason: HOSPADM

## 2025-02-14 RX ADMIN — Medication 10 ML: at 11:19

## 2025-02-14 RX ADMIN — Medication 500 UNITS: at 11:19

## 2025-02-16 NOTE — PROGRESS NOTES
[facility] HEMATOLOGY & ONCOLOGY  [address]  Formerly [facility] [address] [phone]    Patient Name: Antwan Stevenson  Encounter Date: 02/24/2025  YOB: 1960  Patient Number: [id number]      REASON FOR FOLLOW-UP: Antwan Stevenson is a pleasant 64 y.o.  male who is seen on follow-up for stage IIIb adenocarcinoma the rectum.  He is seen C12D13 of neoadjuvant FOLFOX.    The patient is seen with spouse, Ronald.  History is obtained from patient.  The patient is a reliable historian         Oncology/Hematology History Overview Note   DIAGNOSTIC ABNORMALITIES:   He presented with hematochezia.  Patient seen by Jose Guadalupe Diez, DESTIN 3/20/2024.  He reports frequent rectal bleeding.  Patient scheduled for colonoscopy.  CT abdomen pelvis 4/11/2024. Bilateral nephrolithiasis without obstructive uropathy.   Cholelithiasis.   Left pelvic spigelian hernia containing a portion of sigmoid colon.  No bowel obstruction or inflammation. Fatty supraumbilical and bilateral inguinal hernias present as well.  Evidence of prior umbilical hernia repair.  Bibasilar pulmonary micronodules measuring up to 0.4 cm.  Chest CT follow-up in 12 months recommended unless shorter interval follow-up is warranted by history.   Colonoscopy 4/17/2024.  Anal mass. The colon was normal from the rectum to the mid transverse, as I had to use an endoscope due to the severe anal stenosis.  No specimens collected.  The digital rectal exam revealed a firm, hard and obstructing anal mass. The mass was circumferential. Findings: The entire examined colon appeared normal  Pathology report 4/18/2024. 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  Phone note 4/18/2024.  Patient referred to oncology and colorectal surgery at [place].  CEA at 2.1 on 4/24/2024.  Pelvic MRI report on 4/25/2024. 4.5 cm circular solid lower rectum/anal canal neoplasm  without evidence of mucinous features. There is more inferior extent involving the anal canal at the 12:00/12:30 position and 6:00/6:30 position with involvement of the most inferior aspect of the right external anal sphincter. At the level of the anorectal junction there is also posterior extramural extent of tumor into the intersphincteric space abutting and likely involving the external anal sphincter. Two indeterminate right mesorectal lymph nodes.   Note from Dr. Kaylie Stewart 5/1/2024. Plan for total neoadjuvant chemo.  CT chest report 5/3/2024. No definite evidence of metastatic disease in the chest.  Indeterminate 3 mm and 5 mm right lower lobe pulmonary nodules. Recommend a follow-up chest CT in 3 to 6 months to evaluate for stability. Hepatic steatosis. Cholelithiasis. Bilateral nonobstructing renal calculi.           PREVIOUS INTERVENTIONS:   He is undergoing neoadjuvant FOLFOX 5/21/2024 through 2/14/2025, 12 cycles.     Rectal cancer   4/24/2024 Initial Diagnosis    Rectal cancer     5/21/2024 -  Chemotherapy    OP COLON [id number] OXALIplatin / Leucovorin / Fluorouracil (Q2W)         PAST MEDICAL HISTORY:  ALLERGIES:  No Known Allergies  CURRENT MEDICATIONS:  Outpatient Encounter Medications as of 2/24/2025   Medication Sig Dispense Refill    acetaminophen (Tylenol) 325 MG tablet Take 3 tablets by mouth Every 8 (Eight) Hours. Take every 8 hours for 3 days then take prn as needed.      albuterol sulfate  (90 Base) MCG/ACT inhaler Inhale 2 puffs Every 6 (Six) Hours As Needed.      HYDROcodone-acetaminophen (NORCO) 5-325 MG per tablet Take 1 tablet by mouth Every 6 (Six) Hours As Needed for Mild Pain or Moderate Pain. (Patient taking differently: Take 0.5 tablets by mouth At Night As Needed for Mild Pain or Moderate Pain (rectal pain from cancer).) 100 tablet 0    ibuprofen (ADVIL,MOTRIN) 200 MG tablet Take 2 tablets by mouth Every 6 (Six) Hours As Needed for Mild Pain.      lidocaine-prilocaine  (EMLA) 2.5-2.5 % cream 30 minutes prior to treatment apply generous amount of Emla Cream to your port site and cover with clear plastic wrap until ready for use 30 g 2    loratadine (CLARITIN) 10 MG tablet Take 1 tablet by mouth Daily.      multivitamin (THERAGRAN) tablet tablet Take 1 tablet by mouth Daily.      ondansetron (Zofran) 4 MG tablet Take 1 tablet by mouth Every 8 (Eight) Hours As Needed for Nausea or Vomiting. 15 tablet 0    ondansetron (Zofran) 8 MG tablet Take 1 tablet by mouth Every 8 (Eight) Hours As Needed for Nausea or Vomiting. 60 tablet 2    predniSONE (DELTASONE) 5 MG tablet Take 1 tablet by mouth Daily.      prochlorperazine (COMPAZINE) 10 MG tablet Take 1 tablet by mouth Every 4 (Four) Hours As Needed for Nausea or Vomiting. 60 tablet 2    temazepam (RESTORIL) 7.5 MG capsule Take 1 capsule by mouth At Night As Needed for Sleep. HAS NOT STARTED THIS Rx      traMADol (ULTRAM) 50 MG tablet Take 1 tablet by mouth Every 6 (Six) Hours As Needed for Moderate Pain. 180 tablet 0    [] Gadopiclenol (VUEWAY) injection 10 mL       [] Gadopiclenol (VUEWAY) injection 3 mL        No facility-administered encounter medications on file as of 2025.     ADULT ILLNESSES:  Patient Active Problem List   Diagnosis Code    Rectal bleed K62.5    Rectal cancer C20    Port-A-Cath in place Z95.828     SURGERIES:  Past Surgical History:   Procedure Laterality Date    COLONOSCOPY N/A 2024    Procedure: COLONOSCOPY WITH ANESTHESIA;  Surgeon: Salty Arias DO;  Location: [facility] ENDOSCOPY;  Service: Gastroenterology;  Laterality: N/A;  preop; rectal bleed   postop questionable anal mass   PCP Duy Lomeli    HEMORROIDECTOMY      HERNIA REPAIR      VENOUS ACCESS DEVICE (PORT) INSERTION N/A 2024    Procedure: Single Lumen Port-a-cath insertion with flouroscopy;  Surgeon: Rosa Mccall MD;  Location: [facility] OR;  Service: General;  Laterality: N/A;     HEALTH MAINTENANCE ITEMS:  Health  "Maintenance Due   Topic Date Due    BMI FOLLOWUP  Never done    TDAP/TD VACCINES (1 - Tdap) Never done    Pneumococcal Vaccine 50+ (1 of 1 - PCV) Never done    ZOSTER VACCINE (1 of 2) Never done    HEPATITIS C SCREENING  Never done    ANNUAL WELLNESS VISIT  Never done    INFLUENZA VACCINE  07/01/2024    COVID-19 Vaccine (4 - 2024-25 season) 09/01/2024       <no information>  Last Completed Colonoscopy            Discontinued - COLORECTAL CANCER SCREENING  Discontinued        Frequency changed to Never automatically (Topic No Longer Applies)    05/01/2024  Outside Procedure: COLONOSCOPY    04/17/2024  Colonoscopy    04/17/2024  Surgical Procedure: COLONOSCOPY                  Immunization History   Administered Date(s) Administered    COVID-19 (MODERNA) 1st,2nd,3rd Dose Monovalent 04/22/2021, 05/24/2021    COVID-19 (MODERNA) Monovalent Original Booster 01/04/2022     Last Completed Mammogram       This patient has no relevant Health Maintenance data.              FAMILY HISTORY:  Family History   Problem Relation Age of Onset    Colon cancer Neg Hx     Colon polyps Neg Hx     Esophageal cancer Neg Hx      SOCIAL HISTORY:  Social History     Socioeconomic History    Marital status:    Tobacco Use    Smoking status: Never    Smokeless tobacco: Never   Vaping Use    Vaping status: Never Used   Substance and Sexual Activity    Alcohol use: Never    Drug use: Never    Sexual activity: Defer       REVIEW OF SYSTEMS:    Review of Systems   Constitutional:  Positive for fatigue. Negative for fever and unexpected weight change.        \"Still tired.\"   HENT:  Negative for congestion and mouth sores.    Eyes:  Positive for redness. Negative for discharge.   Respiratory:  Negative for shortness of breath and wheezing.    Gastrointestinal:  Negative for blood in stool, constipation, diarrhea, nausea and vomiting.   Endocrine: Negative for cold intolerance and heat intolerance.   Genitourinary:  Negative for dysuria and " "hematuria.   Musculoskeletal:  Negative for gait problem and myalgias.   Skin:  Negative for pallor.   Allergic/Immunologic: Negative for food allergies.   Neurological:  Negative for dizziness and weakness.        Numbness and tingling, fingertips and feet.   Hematological:  Negative for adenopathy. Does not bruise/bleed easily.   Psychiatric/Behavioral:  Negative for agitation, confusion and hallucinations.        VITAL SIGNS: /82   Pulse 88   Temp 97.1 °F (36.2 °C)   Resp 16   Ht 167.6 cm (65.98\")   Wt 109 kg (239 lb 11.2 oz)   SpO2 97%   BMI 38.71 kg/m²   Pain Score    02/24/25 1053   PainSc: 2   Comment: just a tiny bit.  Usual pains       PHYSICAL EXAMINATION:     Physical Exam  Vitals reviewed.   Constitutional:       General: He is not in acute distress.  HENT:      Head: Normocephalic and atraumatic.   Eyes:      General: No scleral icterus.  Cardiovascular:      Rate and Rhythm: Normal rate.   Pulmonary:      Effort: No respiratory distress.      Comments: Port, right. No erythema.   Abdominal:      General: Bowel sounds are normal. There is no distension.      Palpations: Abdomen is soft.   Musculoskeletal:         General: No swelling.      Cervical back: Neck supple.   Skin:     Coloration: Skin is not pale.   Neurological:      Mental Status: He is alert and oriented to person, place, and time.   Psychiatric:         Mood and Affect: Mood normal.         Behavior: Behavior normal.         Thought Content: Thought content normal.         Judgment: Judgment normal.         LABS    Lab Results - Last 18 Months   Lab Units 02/12/25  0832 02/04/25  0833 01/14/25  0844 12/17/24  0824 12/03/24  0848 11/12/24  0843 11/05/24  0838 07/30/24  0810 07/22/24  0740 04/24/24  1406 02/29/24  0859   HEMOGLOBIN g/dL 13.7 12.9* 13.8 13.3 12.9* 13.3 13.7   < > 13.6   < > 14.2   HEMATOCRIT % 42.7 40.3 43.7 41.6 41.4 43.1 43.0   < > 44.1   < > 46.0   MCV fL 82.4 83.8 84.0 83.9 85.0 85.2 84.5   < > 84.2   < > " 84.4   WBC 10*3/mm3 6.30 4.92 5.92 6.90 5.83 6.10 6.00   < > 9.70   < > 6.2   RDW % 15.0 15.3 15.2 15.5* 15.8* 15.4 15.9*   < > 17.2*   < > 13.8   MPV fL 9.5 9.7 9.5 9.5 9.5 9.6 9.3   < > 10.6   < > 10.1   PLATELETS 10*3/mm3 166 209 175 217 198 174 192   < > 60*   < > 298   NEUTROS ABS 10*3/mm3 2.87 1.33* 2.21 2.83 1.40* 1.71 1.47*   < > 7.15*   < > 3.5   LYMPHS ABS K/uL  --   --   --   --   --   --   --   --   --   --  1.6   MONOS ABS K/uL  --   --   --   --   --   --   --   --   --   --  1.00*   EOS ABS 10*3/mm3 0.05 0.00 0.00 0.00 0.00 0.00 0.00   < > 0.10   < > 0.06   BASOS ABS 10*3/mm3 0.00 0.00 0.12 0.00 0.06 0.00 0.00   < > 0.10   < > 0.10   IMMATURE GRANS (ABS) K/uL  --   --   --   --   --   --   --   --   --   --  0.0   NRBC /100 WBC  --   --   --   --   --   --   --   --  1.0*  --   --    NEUTROPHIL % % 44.8 25.0* 37.4* 39.0* 24.0* 28.0* 24.5*   < > 70.7   < >  --    MONOCYTES % % 14.4* 17.0* 13.1* 17.0* 19.0* 22.0* 21.4*   < > 7.1   < >  --    BASOPHIL % % 0.0 0.0 2.0* 0.0 1.0 0.0 0.0   < > 1.0   < >  --    ATYP LYMPH % % 16.8* 21.0* 29.3* 19.0* 25.0* 17.0* 23.5*   < > 3.0   < >  --    ANISOCYTOSIS  Mod/2+ Slight/1+ Slight/1+  --  Slight/1+ Mod/2+ Mod/2+   < > Slight/1+  --   --    GIANT PLT   --  Slight/1+  --   --   --   --   --   --   --   --   --     < > = values in this interval not displayed.       Lab Results - Last 18 Months   Lab Units 02/20/25  1046 02/18/25  1409 02/12/25  0832 02/04/25  0833 01/14/25  0844 12/17/24  0824 12/03/24  0848 11/12/24  0843   GLUCOSE mg/dL  --   --  102* 112* 91 103* 91 105*   SODIUM mmol/L  --   --  139 141 137 139 139 139   POTASSIUM mmol/L  --   --  4.1 4.2 4.1 4.0 4.4 4.3   CO2 mmol/L  --   --  24.0 26.0 25.0 24.0 26.0 28.0   CHLORIDE mmol/L  --   --  104 105 101 103 102 102   ANION GAP mmol/L  --   --  11.0 10.0 11.0 12.0 11.0 9.0   CREATININE mg/dL 1.20 1.00 0.96 0.87 0.80 0.85 0.99 0.83   BUN mg/dL  --   --  10 8 10 10 7* 7*   BUN / CREAT RATIO   --   --  10.4  9.2 12.5 11.8 7.1 8.4   CALCIUM mg/dL  --   --  9.4 9.2 9.8 9.4 9.4 9.5   ALK PHOS U/L  --   --  100 98 106 100 105 107   TOTAL PROTEIN g/dL  --   --  7.7 7.5 7.9 7.4 7.5 7.4   ALT (SGPT) U/L  --   --  22 21 19 24 19 21   AST (SGOT) U/L  --   --  31 32 28 33 32 29   BILIRUBIN mg/dL  --   --  0.5 0.4 0.5 0.4 0.4 0.4   ALBUMIN g/dL  --   --  4.0 4.0 4.2 4.0 3.9 3.9   GLOBULIN gm/dL  --   --  3.7 3.5 3.7 3.4 3.6 3.5       Lab Results - Last 18 Months   Lab Units 02/12/25  0832 04/24/24  1406   CEA ng/mL 1.47 2.10       Lab Results - Last 18 Months   Lab Units 07/16/24  1459 06/18/24  0744 06/04/24  0722 02/29/24  0859   IRON mcg/dL 29*  --  97  --    TIBC mcg/dL 335  --  328  --    IRON SATURATION (TSAT) % 9*  --  30  --    FERRITIN ng/mL 1,735.00*  --  263.00  --    TSH uIU/mL  --   --   --  2.460   FOLATE ng/mL  --  >20.00  --   --        Antwan Stevenson reports a pain score of 2.  Given his pain assessment as noted, treatment options were discussed and the following options were decided upon as a follow-up plan to address the patient's pain: continuation of current treatment plan for pain.              ASSESSMENT:  Adenocarcinoma the rectum. KIKE on 4/28/2024.  AJCC stage:IIIb (cT3, cN1b, cM0)  Treatment status: Patient is undergoing neoadjuvant FOLFOX from 5/21/2024 through 2/14/2025, 12 cycles.  To be followed by CIV 5FU with radiation.   2.  Performance status of 1.  3.  Grade 2 neutropenia without fever from chemo.  Pegfilgrastim ordered as prophylaxis.  The patient had declined.  4.  Normocytic anemia due to chemo.  5.  Grade 1 neuropathy, on observation.  On observation.  6.  Elevated liver function test.  CT abdomen 8/2/2024 showed no liver metastasis.  7.  Neutrophilia due to filgrastim.  8.  Bibasilar pulmonary micronodules measuring up to 0.4 cm on 4/11/2024.  On observation.  9.  Thrombocytopenia from chemo.  On observation.  10. ?Interstitial granulomatous dermatitis with arthritis.  Patient is taking  "prednisone 5 mg daily.           PLAN:   Re: Tolerance to neoadjuvant FOLFOX.  \"Tired.\". CT chest and abdomen report. MRI pelvis report 2/18/2025. No convincing evidence of intrathoracic metastatic disease, there is a small new area of subpleural infiltrate in the medial left lung base, which abuts the diaphragm. I suspect this is related to atelectasis, however follow-up is recommended with repeat chest CT in 3 months. The 2 tiny nodules seen before the right lung are unchanged.  Solitary dependent gallstone in the gallbladder without evidence of cholecystitis. Hepatic steatosis. 6 mm stable focus of decreased attenuation in the caudate lobe of the liver, probably a small cyst.  No acute abnormality of the abdomen or pelvis, particularly, no finding to suggest metastatic disease in the abdomen or pelvis.  Asymmetrical thickening of the posterior wall of the distal rectum is similar to the previous study and may represent patient's primary neoplasm as suggested in the history. This area has suboptimally visualized and evaluated. This may be further evaluated by proctoscopy.  Cholelithiasis. No evidence of cholecystitis.  Enlarged left lower abdominal spigelian hernia containing a loop of descending/sigmoid colon. No obstruction. Fat-containing inguinal hernias larger one on the right side. Fat-containing small umbilical/paraumbilical hernias, similar to the previous study. Evidence of partial response to therapy with anorectal tumor measuring 2.0 x 1.7 x 3.3 cm, previously 4.5 x 4.0 x 3.4 cm on 4/25/2024. Anatomic considerations and local involvement. Two areas of linear tracking from the tumor toward the gluteal folds concerning for fistula formation. Similar appearance at the posterior aspect of the puborectalis muscle. RIGHT 3 mm round mesorectal lymph node, previously 5 mm on 4/25/2024.  2.   Re:  Heme status.  WBC 6.3, ANC 2.87, hemoglobin 13.7, hematocrit 42.7, MCV 82.4 and platelet 166.    3.  " " Re:  CMP. GFR 87.2, AST 31 and alkaline phosphatase 100.  4.   Re:  Magnesium 2.1. CEA 1.47.   5.   Re:  Grade 1 neuropathy, under observatin.   6.   Re: Intravenous fluid administration as indicated on D3 secondary to nausea and worsening fatigue.  7.   Re:  Completion of neoadjuvant FOLFOX. Plan for 5FU with radiation.  10.  Schedule 5- mg/m2 with radiation.  \"I will do chemo and radiation in [place].\"  11.    Premedicate with:  Zofran 8 mg IV D1 only.  12.   Order CBC with differential and CMP weekly.  13.  eRx ondansetron 8 mg p.o. every 8 hours as needed for nausea and vomiting #60, 2 refills if needed.  14.  eRx prochlorperazine 10 mg p.o. every 4 hours as needed for nausea and vomiting #60, 2 refills if needed.  15.  eRx Tramadol 50 mg p.o. every 6 hours as needed for pain, #180, 0 refill if needed.  16.  Continue care per primary care physician and the other specialists.  17.  Plan of care discussed with patient and spouse.  Understanding expressed.  He is agreeable to proceed.  18.  Advance Care Planning  ACP discussion was declined by the patient. Patient does not have an advance directive, information provided.  19.  Flush port every 6 weeks.   20.  Return to office 3 months with CBC with differential and CMP.  \"Will start my chemoradiation sometime in April. Will be there for 6 weeks.\"           I have reviewed the assessment and plan and verified the accuracy of it. No changes to assessment and plan since the information was documented. Raul Mayberry MD 02/24/25          I spent 33 total minutes, face-to-face, caring for Antwan today. Greater than 50% of this time involved counseling and/or coordination of care as documented within this note.               (Salty Arias DO)  (Kaylie Stewart MD)   (Marty Benitez MD radiation oncology [place] [facility].  (Bruno Sanford MD)  (Duy Lomeli MD)                    "

## 2025-02-18 ENCOUNTER — HOSPITAL ENCOUNTER (OUTPATIENT)
Dept: CT IMAGING | Facility: HOSPITAL | Age: 65
Discharge: HOME | End: 2025-02-18
Admitting: INTERNAL MEDICINE
Payer: MEDICARE

## 2025-02-18 DIAGNOSIS — C20 RECTAL CANCER: ICD-10-CM

## 2025-02-18 LAB — CREAT BLDA-MCNC: 1 MG/DL (ref 0.6–1.3)

## 2025-02-18 PROCEDURE — 25510000001 IOPAMIDOL 61 % SOLUTION: Performed by: INTERNAL MEDICINE

## 2025-02-18 PROCEDURE — 74160 CT ABDOMEN W/CONTRAST: CPT

## 2025-02-18 PROCEDURE — 71260 CT THORAX DX C+: CPT

## 2025-02-18 PROCEDURE — 82565 ASSAY OF CREATININE: CPT

## 2025-02-18 RX ORDER — IOPAMIDOL 612 MG/ML
100 INJECTION, SOLUTION INTRAVASCULAR
Status: COMPLETED | OUTPATIENT
Start: 2025-02-18 | End: 2025-02-18

## 2025-02-18 RX ADMIN — IOPAMIDOL 100 ML: 612 INJECTION, SOLUTION INTRAVENOUS at 14:26

## 2025-02-20 ENCOUNTER — HOSPITAL ENCOUNTER (OUTPATIENT)
Dept: MRI IMAGING | Facility: HOSPITAL | Age: 65
Discharge: HOME | End: 2025-02-20
Admitting: INTERNAL MEDICINE
Payer: MEDICARE

## 2025-02-20 ENCOUNTER — TELEPHONE (OUTPATIENT)
Dept: ONCOLOGY | Facility: CLINIC | Age: 65
End: 2025-02-20
Payer: MEDICARE

## 2025-02-20 DIAGNOSIS — C20 RECTAL CANCER: ICD-10-CM

## 2025-02-20 LAB — CREAT BLDA-MCNC: 1.2 MG/DL (ref 0.6–1.3)

## 2025-02-20 PROCEDURE — 72197 MRI PELVIS W/O & W/DYE: CPT

## 2025-02-20 PROCEDURE — A9579 GAD-BASE MR CONTRAST NOS,1ML: HCPCS | Performed by: INTERNAL MEDICINE

## 2025-02-20 PROCEDURE — 25510000001 GADOPICLENOL 0.5 MMOL/ML SOLUTION: Performed by: INTERNAL MEDICINE

## 2025-02-20 PROCEDURE — 82565 ASSAY OF CREATININE: CPT

## 2025-02-20 RX ADMIN — GADOPICLENOL 1 ML: 485.1 INJECTION INTRAVENOUS at 11:48

## 2025-02-20 RX ADMIN — GADOPICLENOL 10 ML: 485.1 INJECTION INTRAVENOUS at 11:48

## 2025-02-20 NOTE — TELEPHONE ENCOUNTER
----- Message from Raul Mayberry sent at 2/20/2025  1:09 PM CST -----   Send to Dr. Kaylie Stewart.        Evidence of partial response to therapy with anorectal tumor  measuring 2.0 x 1.7 x 3.3 cm, previously 4.5 x 4.0 x 3.4 cm on  4/25/2024. Anatomic considerations and local involvement as detailed  above.  Two areas of linear tracking from the tumor toward the gluteal folds  concerning for fistula formation. Similar appearance at the posterior  aspect of the puborectalis muscle.   RIGHT 3 mm round mesorectal lymph node, previously 5 mm on 4/25/2024

## 2025-02-24 ENCOUNTER — OFFICE VISIT (OUTPATIENT)
Dept: ONCOLOGY | Facility: CLINIC | Age: 65
End: 2025-02-24
Payer: MEDICARE

## 2025-02-24 VITALS
SYSTOLIC BLOOD PRESSURE: 144 MMHG | OXYGEN SATURATION: 97 % | RESPIRATION RATE: 16 BRPM | DIASTOLIC BLOOD PRESSURE: 82 MMHG | HEART RATE: 88 BPM | HEIGHT: 66 IN | BODY MASS INDEX: 38.52 KG/M2 | WEIGHT: 239.7 LBS | TEMPERATURE: 97.1 F

## 2025-02-24 DIAGNOSIS — C20 RECTAL CANCER: Primary | ICD-10-CM

## 2025-02-24 PROCEDURE — 99214 OFFICE O/P EST MOD 30 MIN: CPT | Performed by: INTERNAL MEDICINE

## 2025-02-24 PROCEDURE — 1159F MED LIST DOCD IN RCRD: CPT | Performed by: INTERNAL MEDICINE

## 2025-02-24 PROCEDURE — 1125F AMNT PAIN NOTED PAIN PRSNT: CPT | Performed by: INTERNAL MEDICINE

## 2025-02-24 PROCEDURE — 1160F RVW MEDS BY RX/DR IN RCRD: CPT | Performed by: INTERNAL MEDICINE

## 2025-02-24 NOTE — LETTER
February 24, 2025     Duy Lomeli MD   [address] [facility] [place] [address]    Patient: Antwan Stevenson   YOB: 1960   Date of Visit: 2/24/2025     Dear Duy Lomeli MD:       Thank you for referring Antwan Stevenson to me for evaluation. Below are the relevant portions of my assessment and plan of care.    If you have questions, please do not hesitate to call me. I look forward to following Antwan along with you.         Sincerely,        Raul Mayberry MD        CC: MD Isaiah Hernandez MD Chua, Winston, MD  02/24/25 1125  Sign when Signing Visit  [facility] HEMATOLOGY & ONCOLOGY  [address]  [place] [address]  [phone]    Patient Name: Antwan Stevenson  Encounter Date: 02/24/2025  YOB: 1960  Patient Number: [id number]      REASON FOR FOLLOW-UP: Antwan Stevenson is a pleasant 64 y.o.  male who is seen on follow-up for stage IIIb adenocarcinoma the rectum.  He is seen C12D13 of neoadjuvant FOLFOX.    The patient is seen with spouse, Ronald.  History is obtained from patient.  The patient is a reliable historian         Oncology/Hematology History Overview Note   DIAGNOSTIC ABNORMALITIES:   He presented with hematochezia.  Patient seen by DESTIN Martinez 3/20/2024.  He reports frequent rectal bleeding.  Patient scheduled for colonoscopy.  CT abdomen pelvis 4/11/2024. Bilateral nephrolithiasis without obstructive uropathy.   Cholelithiasis.   Left pelvic spigelian hernia containing a portion of sigmoid colon.  No bowel obstruction or inflammation. Fatty supraumbilical and bilateral inguinal hernias present as well.  Evidence of prior umbilical hernia repair.  Bibasilar pulmonary micronodules measuring up to 0.4 cm.  Chest CT follow-up in 12 months recommended unless shorter interval follow-up is warranted by history.   Colonoscopy 4/17/2024.  Anal mass. The colon was normal from the rectum to  the mid transverse, as I had to use an endoscope due to the severe anal stenosis.  No specimens collected.  The digital rectal exam revealed a firm, hard and obstructing anal mass. The mass was circumferential. Findings: The entire examined colon appeared normal  Pathology report 4/18/2024. 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  Phone note 4/18/2024.  Patient referred to oncology and colorectal surgery at [facility].  CEA at 2.1 on 4/24/2024.  Pelvic MRI report on 4/25/2024. 4.5 cm circular solid lower rectum/anal canal neoplasm without evidence of mucinous features. There is more inferior extent involving the anal canal at the 12:00/12:30 position and 6:00/6:30 position with involvement of the most inferior aspect of the right external anal sphincter. At the level of the anorectal junction there is also posterior extramural extent of tumor into the intersphincteric space abutting and likely involving the external anal sphincter. Two indeterminate right mesorectal lymph nodes.   Note from Dr. Kaylie Stewart 5/1/2024. Plan for total neoadjuvant chemo.  CT chest report 5/3/2024. No definite evidence of metastatic disease in the chest.  Indeterminate 3 mm and 5 mm right lower lobe pulmonary nodules. Recommend a follow-up chest CT in 3 to 6 months to evaluate for stability. Hepatic steatosis. Cholelithiasis. Bilateral nonobstructing renal calculi.           PREVIOUS INTERVENTIONS:   He is undergoing neoadjuvant FOLFOX 5/21/2024 through 2/14/2025, 12 cycles.     Rectal cancer   4/24/2024 Initial Diagnosis    Rectal cancer     5/21/2024 -  Chemotherapy    OP COLON [id number] OXALIplatin / Leucovorin / Fluorouracil (Q2W)         PAST MEDICAL HISTORY:  ALLERGIES:  No Known Allergies  CURRENT MEDICATIONS:  Outpatient Encounter Medications as of 2/24/2025   Medication Sig Dispense Refill   • acetaminophen (Tylenol) 325 MG tablet Take 3 tablets by mouth Every 8 (Eight) Hours.  Take every 8 hours for 3 days then take prn as needed.     • albuterol sulfate  (90 Base) MCG/ACT inhaler Inhale 2 puffs Every 6 (Six) Hours As Needed.     • HYDROcodone-acetaminophen (NORCO) 5-325 MG per tablet Take 1 tablet by mouth Every 6 (Six) Hours As Needed for Mild Pain or Moderate Pain. (Patient taking differently: Take 0.5 tablets by mouth At Night As Needed for Mild Pain or Moderate Pain (rectal pain from cancer).) 100 tablet 0   • ibuprofen (ADVIL,MOTRIN) 200 MG tablet Take 2 tablets by mouth Every 6 (Six) Hours As Needed for Mild Pain.     • lidocaine-prilocaine (EMLA) 2.5-2.5 % cream 30 minutes prior to treatment apply generous amount of Emla Cream to your port site and cover with clear plastic wrap until ready for use 30 g 2   • loratadine (CLARITIN) 10 MG tablet Take 1 tablet by mouth Daily.     • multivitamin (THERAGRAN) tablet tablet Take 1 tablet by mouth Daily.     • ondansetron (Zofran) 4 MG tablet Take 1 tablet by mouth Every 8 (Eight) Hours As Needed for Nausea or Vomiting. 15 tablet 0   • ondansetron (Zofran) 8 MG tablet Take 1 tablet by mouth Every 8 (Eight) Hours As Needed for Nausea or Vomiting. 60 tablet 2   • predniSONE (DELTASONE) 5 MG tablet Take 1 tablet by mouth Daily.     • prochlorperazine (COMPAZINE) 10 MG tablet Take 1 tablet by mouth Every 4 (Four) Hours As Needed for Nausea or Vomiting. 60 tablet 2   • temazepam (RESTORIL) 7.5 MG capsule Take 1 capsule by mouth At Night As Needed for Sleep. HAS NOT STARTED THIS Rx     • traMADol (ULTRAM) 50 MG tablet Take 1 tablet by mouth Every 6 (Six) Hours As Needed for Moderate Pain. 180 tablet 0   • [] Gadopiclenol (VUEWAY) injection 10 mL      • [] Gadopiclenol (VUEWAY) injection 3 mL        No facility-administered encounter medications on file as of 2025.     ADULT ILLNESSES:  Patient Active Problem List   Diagnosis Code   • Rectal bleed K62.5   • Rectal cancer C20   • Port-A-Cath in place Z95.828      SURGERIES:  Past Surgical History:   Procedure Laterality Date   • COLONOSCOPY N/A 4/17/2024    Procedure: COLONOSCOPY WITH ANESTHESIA;  Surgeon: Salty Arias DO;  Location: [facility] ENDOSCOPY;  Service: Gastroenterology;  Laterality: N/A;  preop; rectal bleed   postop questionable anal mass   PCP Duy Lomeli   • HEMORROIDECTOMY     • HERNIA REPAIR     • VENOUS ACCESS DEVICE (PORT) INSERTION N/A 5/16/2024    Procedure: Single Lumen Port-a-cath insertion with flouroscopy;  Surgeon: Rosa Mccall MD;  Location: [facility] OR;  Service: General;  Laterality: N/A;     HEALTH MAINTENANCE ITEMS:  Health Maintenance Due   Topic Date Due   • BMI FOLLOWUP  Never done   • TDAP/TD VACCINES (1 - Tdap) Never done   • Pneumococcal Vaccine 50+ (1 of 1 - PCV) Never done   • ZOSTER VACCINE (1 of 2) Never done   • HEPATITIS C SCREENING  Never done   • ANNUAL WELLNESS VISIT  Never done   • INFLUENZA VACCINE  07/01/2024   • COVID-19 Vaccine (4 - 2024-25 season) 09/01/2024       <no information>  Last Completed Colonoscopy            Discontinued - COLORECTAL CANCER SCREENING  Discontinued        Frequency changed to Never automatically (Topic No Longer Applies)    05/01/2024  Outside Procedure: COLONOSCOPY    04/17/2024  Colonoscopy    04/17/2024  Surgical Procedure: COLONOSCOPY                  Immunization History   Administered Date(s) Administered   • COVID-19 (MODERNA) 1st,2nd,3rd Dose Monovalent 04/22/2021, 05/24/2021   • COVID-19 (MODERNA) Monovalent Original Booster 01/04/2022     Last Completed Mammogram       This patient has no relevant Health Maintenance data.              FAMILY HISTORY:  Family History   Problem Relation Age of Onset   • Colon cancer Neg Hx    • Colon polyps Neg Hx    • Esophageal cancer Neg Hx      SOCIAL HISTORY:  Social History     Socioeconomic History   • Marital status:    Tobacco Use   • Smoking status: Never   • Smokeless tobacco: Never   Vaping Use   • Vaping status: Never Used  "  Substance and Sexual Activity   • Alcohol use: Never   • Drug use: Never   • Sexual activity: Defer       REVIEW OF SYSTEMS:    Review of Systems   Constitutional:  Positive for fatigue. Negative for fever and unexpected weight change.        \"Still tired.\"   HENT:  Negative for congestion and mouth sores.    Eyes:  Positive for redness. Negative for discharge.   Respiratory:  Negative for shortness of breath and wheezing.    Gastrointestinal:  Negative for blood in stool, constipation, diarrhea, nausea and vomiting.   Endocrine: Negative for cold intolerance and heat intolerance.   Genitourinary:  Negative for dysuria and hematuria.   Musculoskeletal:  Negative for gait problem and myalgias.   Skin:  Negative for pallor.   Allergic/Immunologic: Negative for food allergies.   Neurological:  Negative for dizziness and weakness.        Numbness and tingling, fingertips and feet.   Hematological:  Negative for adenopathy. Does not bruise/bleed easily.   Psychiatric/Behavioral:  Negative for agitation, confusion and hallucinations.        VITAL SIGNS: /82   Pulse 88   Temp 97.1 °F (36.2 °C)   Resp 16   Ht 167.6 cm (65.98\")   Wt 109 kg (239 lb 11.2 oz)   SpO2 97%   BMI 38.71 kg/m²   Pain Score    02/24/25 1053   PainSc: 2   Comment: just a tiny bit.  Usual pains       PHYSICAL EXAMINATION:     Physical Exam  Vitals reviewed.   Constitutional:       General: He is not in acute distress.  HENT:      Head: Normocephalic and atraumatic.   Eyes:      General: No scleral icterus.  Cardiovascular:      Rate and Rhythm: Normal rate.   Pulmonary:      Effort: No respiratory distress.      Comments: Port, right. No erythema.   Abdominal:      General: Bowel sounds are normal. There is no distension.      Palpations: Abdomen is soft.   Musculoskeletal:         General: No swelling.      Cervical back: Neck supple.   Skin:     Coloration: Skin is not pale.   Neurological:      Mental Status: He is alert and oriented " to person, place, and time.   Psychiatric:         Mood and Affect: Mood normal.         Behavior: Behavior normal.         Thought Content: Thought content normal.         Judgment: Judgment normal.         LABS    Lab Results - Last 18 Months   Lab Units 02/12/25  0832 02/04/25  0833 01/14/25  0844 12/17/24  0824 12/03/24  0848 11/12/24  0843 11/05/24  0838 07/30/24  0810 07/22/24  0740 04/24/24  1406 02/29/24  0859   HEMOGLOBIN g/dL 13.7 12.9* 13.8 13.3 12.9* 13.3 13.7   < > 13.6   < > 14.2   HEMATOCRIT % 42.7 40.3 43.7 41.6 41.4 43.1 43.0   < > 44.1   < > 46.0   MCV fL 82.4 83.8 84.0 83.9 85.0 85.2 84.5   < > 84.2   < > 84.4   WBC 10*3/mm3 6.30 4.92 5.92 6.90 5.83 6.10 6.00   < > 9.70   < > 6.2   RDW % 15.0 15.3 15.2 15.5* 15.8* 15.4 15.9*   < > 17.2*   < > 13.8   MPV fL 9.5 9.7 9.5 9.5 9.5 9.6 9.3   < > 10.6   < > 10.1   PLATELETS 10*3/mm3 166 209 175 217 198 174 192   < > 60*   < > 298   NEUTROS ABS 10*3/mm3 2.87 1.33* 2.21 2.83 1.40* 1.71 1.47*   < > 7.15*   < > 3.5   LYMPHS ABS K/uL  --   --   --   --   --   --   --   --   --   --  1.6   MONOS ABS K/uL  --   --   --   --   --   --   --   --   --   --  1.00*   EOS ABS 10*3/mm3 0.05 0.00 0.00 0.00 0.00 0.00 0.00   < > 0.10   < > 0.06   BASOS ABS 10*3/mm3 0.00 0.00 0.12 0.00 0.06 0.00 0.00   < > 0.10   < > 0.10   IMMATURE GRANS (ABS) K/uL  --   --   --   --   --   --   --   --   --   --  0.0   NRBC /100 WBC  --   --   --   --   --   --   --   --  1.0*  --   --    NEUTROPHIL % % 44.8 25.0* 37.4* 39.0* 24.0* 28.0* 24.5*   < > 70.7   < >  --    MONOCYTES % % 14.4* 17.0* 13.1* 17.0* 19.0* 22.0* 21.4*   < > 7.1   < >  --    BASOPHIL % % 0.0 0.0 2.0* 0.0 1.0 0.0 0.0   < > 1.0   < >  --    ATYP LYMPH % % 16.8* 21.0* 29.3* 19.0* 25.0* 17.0* 23.5*   < > 3.0   < >  --    ANISOCYTOSIS  Mod/2+ Slight/1+ Slight/1+  --  Slight/1+ Mod/2+ Mod/2+   < > Slight/1+  --   --    GIANT PLT   --  Slight/1+  --   --   --   --   --   --   --   --   --     < > = values in this interval  not displayed.       Lab Results - Last 18 Months   Lab Units 02/20/25  1046 02/18/25  1409 02/12/25  0832 02/04/25  0833 01/14/25  0844 12/17/24  0824 12/03/24  0848 11/12/24  0843   GLUCOSE mg/dL  --   --  102* 112* 91 103* 91 105*   SODIUM mmol/L  --   --  139 141 137 139 139 139   POTASSIUM mmol/L  --   --  4.1 4.2 4.1 4.0 4.4 4.3   CO2 mmol/L  --   --  24.0 26.0 25.0 24.0 26.0 28.0   CHLORIDE mmol/L  --   --  104 105 101 103 102 102   ANION GAP mmol/L  --   --  11.0 10.0 11.0 12.0 11.0 9.0   CREATININE mg/dL 1.20 1.00 0.96 0.87 0.80 0.85 0.99 0.83   BUN mg/dL  --   --  10 8 10 10 7* 7*   BUN / CREAT RATIO   --   --  10.4 9.2 12.5 11.8 7.1 8.4   CALCIUM mg/dL  --   --  9.4 9.2 9.8 9.4 9.4 9.5   ALK PHOS U/L  --   --  100 98 106 100 105 107   TOTAL PROTEIN g/dL  --   --  7.7 7.5 7.9 7.4 7.5 7.4   ALT (SGPT) U/L  --   --  22 21 19 24 19 21   AST (SGOT) U/L  --   --  31 32 28 33 32 29   BILIRUBIN mg/dL  --   --  0.5 0.4 0.5 0.4 0.4 0.4   ALBUMIN g/dL  --   --  4.0 4.0 4.2 4.0 3.9 3.9   GLOBULIN gm/dL  --   --  3.7 3.5 3.7 3.4 3.6 3.5       Lab Results - Last 18 Months   Lab Units 02/12/25  0832 04/24/24  1406   CEA ng/mL 1.47 2.10       Lab Results - Last 18 Months   Lab Units 07/16/24  1459 06/18/24  0744 06/04/24  0722 02/29/24  0859   IRON mcg/dL 29*  --  97  --    TIBC mcg/dL 335  --  328  --    IRON SATURATION (TSAT) % 9*  --  30  --    FERRITIN ng/mL 1,735.00*  --  263.00  --    TSH uIU/mL  --   --   --  2.460   FOLATE ng/mL  --  >20.00  --   --        Antwan Stevenson reports a pain score of 2.  Given his pain assessment as noted, treatment options were discussed and the following options were decided upon as a follow-up plan to address the patient's pain: continuation of current treatment plan for pain.              ASSESSMENT:  Adenocarcinoma the rectum. KIKE on 4/28/2024.  AJCC stage:IIIb (cT3, cN1b, cM0)  Treatment status: Patient is undergoing neoadjuvant FOLFOX from 5/21/2024 through 2/14/2025, 12 cycles.   "To be followed by CIV 5FU with radiation.   2.  Performance status of 1.  3.  Grade 2 neutropenia without fever from chemo.  Pegfilgrastim ordered as prophylaxis.  The patient had declined.  4.  Normocytic anemia due to chemo.  5.  Grade 1 neuropathy, on observation.  On observation.  6.  Elevated liver function test.  CT abdomen 8/2/2024 showed no liver metastasis.  7.  Neutrophilia due to filgrastim.  8.  Bibasilar pulmonary micronodules measuring up to 0.4 cm on 4/11/2024.  On observation.  9.  Thrombocytopenia from chemo.  On observation.  10. ?Interstitial granulomatous dermatitis with arthritis.  Patient is taking prednisone 5 mg daily.           PLAN:   Re: Tolerance to neoadjuvant FOLFOX.  \"Tired.\". CT chest and abdomen report. MRI pelvis report 2/18/2025. No convincing evidence of intrathoracic metastatic disease, there is a small new area of subpleural infiltrate in the medial left lung base, which abuts the diaphragm. I suspect this is related to atelectasis, however follow-up is recommended with repeat chest CT in 3 months. The 2 tiny nodules seen before the right lung are unchanged.  Solitary dependent gallstone in the gallbladder without evidence of cholecystitis. Hepatic steatosis. 6 mm stable focus of decreased attenuation in the caudate lobe of the liver, probably a small cyst.  No acute abnormality of the abdomen or pelvis, particularly, no finding to suggest metastatic disease in the abdomen or pelvis.  Asymmetrical thickening of the posterior wall of the distal rectum is similar to the previous study and may represent patient's primary neoplasm as suggested in the history. This area has suboptimally visualized and evaluated. This may be further evaluated by proctoscopy.  Cholelithiasis. No evidence of cholecystitis.  Enlarged left lower abdominal spigelian hernia containing a loop of descending/sigmoid colon. No obstruction. Fat-containing inguinal hernias larger one on the right side. " "Fat-containing small umbilical/paraumbilical hernias, similar to the previous study. Evidence of partial response to therapy with anorectal tumor measuring 2.0 x 1.7 x 3.3 cm, previously 4.5 x 4.0 x 3.4 cm on 4/25/2024. Anatomic considerations and local involvement. Two areas of linear tracking from the tumor toward the gluteal folds concerning for fistula formation. Similar appearance at the posterior aspect of the puborectalis muscle. RIGHT 3 mm round mesorectal lymph node, previously 5 mm on 4/25/2024.  2.   Re:  Heme status.  WBC 6.3, ANC 2.87, hemoglobin 13.7, hematocrit 42.7, MCV 82.4 and platelet 166.    3.   Re:  CMP. GFR 87.2, AST 31 and alkaline phosphatase 100.  4.   Re:  Magnesium 2.1. CEA 1.47.   5.   Re:  Grade 1 neuropathy, under observatin.   6.   Re: Intravenous fluid administration as indicated on D3 secondary to nausea and worsening fatigue.  7.   Re:  Completion of neoadjuvant FOLFOX. Plan for 5FU with radiation.  10.  Schedule 5- mg/m2 with radiation.  \"I will do chemo and radiation in [place].\"  11.    Premedicate with:  Zofran 8 mg IV D1 only.  12.   Order CBC with differential and CMP weekly.  13.  eRx ondansetron 8 mg p.o. every 8 hours as needed for nausea and vomiting #60, 2 refills if needed.  14.  eRx prochlorperazine 10 mg p.o. every 4 hours as needed for nausea and vomiting #60, 2 refills if needed.  15.  eRx Tramadol 50 mg p.o. every 6 hours as needed for pain, #180, 0 refill if needed.  16.  Continue care per primary care physician and the other specialists.  17.  Plan of care discussed with patient and spouse.  Understanding expressed.  He is agreeable to proceed.  18.  Advance Care Planning  ACP discussion was declined by the patient. Patient does not have an advance directive, information provided.  19.  Flush port every 6 weeks.   20.  Return to office 3 months with CBC with differential and CMP.  \"Will start my chemoradiation " "sometime in April. Will be there for 6 weeks.\"           I have reviewed the assessment and plan and verified the accuracy of it. No changes to assessment and plan since the information was documented. Raul Mayberry MD 02/24/25          I spent 33 total minutes, face-to-face, caring for Antwan today. Greater than 50% of this time involved counseling and/or coordination of care as documented within this note.               (Salty Arias DO)  (Kaylie Stewart MD)   (Marty Benitez MD radiation oncology [place] [facility].  (Bruno Sanford MD)  (Duy Lomeli MD)                    "

## 2025-07-08 ENCOUNTER — INFUSION (OUTPATIENT)
Dept: ONCOLOGY | Facility: CLINIC | Age: 65
End: 2025-07-08
Payer: MEDICARE

## 2025-07-08 DIAGNOSIS — Z95.828 PORT-A-CATH IN PLACE: Primary | ICD-10-CM

## 2025-07-08 RX ORDER — HEPARIN SODIUM (PORCINE) LOCK FLUSH IV SOLN 100 UNIT/ML 100 UNIT/ML
500 SOLUTION INTRAVENOUS AS NEEDED
Status: DISCONTINUED | OUTPATIENT
Start: 2025-07-08 | End: 2025-07-08 | Stop reason: HOSPADM

## 2025-07-08 RX ORDER — SODIUM CHLORIDE 0.9 % (FLUSH) 0.9 %
10 SYRINGE (ML) INJECTION AS NEEDED
Status: DISCONTINUED | OUTPATIENT
Start: 2025-07-08 | End: 2025-07-08 | Stop reason: HOSPADM

## 2025-07-08 RX ADMIN — Medication 10 ML: at 09:36

## 2025-07-08 RX ADMIN — HEPARIN SODIUM (PORCINE) LOCK FLUSH IV SOLN 100 UNIT/ML 500 UNITS: 100 SOLUTION at 09:36

## 2025-07-29 ENCOUNTER — TELEPHONE (OUTPATIENT)
Dept: ONCOLOGY | Facility: CLINIC | Age: 65
End: 2025-07-29

## 2025-08-05 DIAGNOSIS — L98.8 FISTULA: Primary | ICD-10-CM

## 2025-08-05 RX ORDER — METRONIDAZOLE 500 MG/1
500 TABLET ORAL 3 TIMES DAILY
Qty: 30 TABLET | Refills: 0 | Status: SHIPPED | OUTPATIENT
Start: 2025-08-05 | End: 2025-08-15

## 2025-08-05 RX ORDER — CIPROFLOXACIN 500 MG/1
500 TABLET, FILM COATED ORAL 2 TIMES DAILY
Qty: 20 TABLET | Refills: 0 | Status: SHIPPED | OUTPATIENT
Start: 2025-08-05 | End: 2025-08-15

## (undated) DEVICE — ADHS SKIN PREMIERPRO EXOFIN TOPICAL HI/VISC .5ML

## (undated) DEVICE — SYR LUERLOK 30CC

## (undated) DEVICE — MASK,OXYGEN,MED CONC,ADLT,7' TUB, UC: Brand: PENDING

## (undated) DEVICE — FRCP BX RADJAW4 NDL 2.8 240 STD OG

## (undated) DEVICE — SUT SILK 2/0 SUTUPAK TIES 24IN SA75H

## (undated) DEVICE — 3M™ IOBAN™ 2 ANTIMICROBIAL INCISE DRAPE 6650EZ: Brand: IOBAN™ 2

## (undated) DEVICE — GLV SURG SENSICARE W/ALOE PF LF 6.5 STRL

## (undated) DEVICE — SYR LUERLOK 20CC BX/50

## (undated) DEVICE — Device: Brand: DEFENDO AIR/WATER/SUCTION AND BIOPSY VALVE

## (undated) DEVICE — SYRINGE,CONTROL,LL,FINGER,GRIP: Brand: MEDLINE INDUSTRIES, INC.

## (undated) DEVICE — INTENDED FOR TISSUE SEPARATION, AND OTHER PROCEDURES THAT REQUIRE A SHARP SURGICAL BLADE TO PUNCTURE OR CUT.: Brand: BARD-PARKER ® STAINLESS STEEL BLADES

## (undated) DEVICE — NDL HYPO PRECISIONGLIDE REG 25G 1 1/2

## (undated) DEVICE — YANKAUER,BULB TIP WITH VENT: Brand: ARGYLE

## (undated) DEVICE — GLOVE,SURG,SENSICARE,ALOE,LF,PF,6: Brand: MEDLINE

## (undated) DEVICE — TRAP FLD MINIVAC MEGADYNE 100ML

## (undated) DEVICE — 4-PORT MANIFOLD: Brand: NEPTUNE 2

## (undated) DEVICE — ANTIBACTERIAL UNDYED BRAIDED (POLYGLACTIN 910), SYNTHETIC ABSORBABLE SUTURE: Brand: COATED VICRYL

## (undated) DEVICE — PAD MINOR UNIVERSAL: Brand: MEDLINE INDUSTRIES, INC.

## (undated) DEVICE — THE CHANNEL CLEANING BRUSH IS A NYLON FLEXI BRUSH ATTACHED TO A FLEXIBLE PLASTIC SHEATH DESIGNED TO SAFELY REMOVE DEBRIS FROM FLEXIBLE ENDOSCOPES.

## (undated) DEVICE — ANTIBACTERIAL VIOLET BRAIDED (POLYGLACTIN 910), SYNTHETIC ABSORBABLE SUTURE: Brand: COATED VICRYL

## (undated) DEVICE — CVR UNIV C/ARM

## (undated) DEVICE — APPL CHLORAPREP HI/LITE 26ML ORNG

## (undated) DEVICE — ELECTRD BLD EZ CLN MOD XLNG 2.75IN

## (undated) DEVICE — DECANTER: Brand: UNBRANDED

## (undated) DEVICE — SUT MNCRYL 4/0 PS2 27IN UD MCP426H

## (undated) DEVICE — SENSR O2 OXIMAX FNGR A/ 18IN NONSTR